# Patient Record
Sex: MALE | Race: WHITE | NOT HISPANIC OR LATINO | URBAN - METROPOLITAN AREA
[De-identification: names, ages, dates, MRNs, and addresses within clinical notes are randomized per-mention and may not be internally consistent; named-entity substitution may affect disease eponyms.]

---

## 2017-05-02 ENCOUNTER — INPATIENT (INPATIENT)
Facility: HOSPITAL | Age: 67
LOS: 1 days | Discharge: HOME | DRG: 247 | End: 2017-05-04
Attending: FAMILY MEDICINE | Admitting: FAMILY MEDICINE
Payer: MEDICAID

## 2017-05-02 VITALS
HEART RATE: 47 BPM | SYSTOLIC BLOOD PRESSURE: 156 MMHG | OXYGEN SATURATION: 96 % | TEMPERATURE: 98 F | DIASTOLIC BLOOD PRESSURE: 89 MMHG | RESPIRATION RATE: 17 BRPM

## 2017-05-02 DIAGNOSIS — Z29.9 ENCOUNTER FOR PROPHYLACTIC MEASURES, UNSPECIFIED: ICD-10-CM

## 2017-05-02 DIAGNOSIS — I25.10 ATHEROSCLEROTIC HEART DISEASE OF NATIVE CORONARY ARTERY WITHOUT ANGINA PECTORIS: ICD-10-CM

## 2017-05-02 DIAGNOSIS — R07.9 CHEST PAIN, UNSPECIFIED: ICD-10-CM

## 2017-05-02 DIAGNOSIS — I10 ESSENTIAL (PRIMARY) HYPERTENSION: ICD-10-CM

## 2017-05-02 DIAGNOSIS — Z95.5 PRESENCE OF CORONARY ANGIOPLASTY IMPLANT AND GRAFT: Chronic | ICD-10-CM

## 2017-05-02 DIAGNOSIS — E78.5 HYPERLIPIDEMIA, UNSPECIFIED: ICD-10-CM

## 2017-05-02 LAB
ALBUMIN SERPL ELPH-MCNC: 4.5 G/DL — SIGNIFICANT CHANGE UP (ref 3.3–5)
ALP SERPL-CCNC: 48 U/L — SIGNIFICANT CHANGE UP (ref 40–120)
ALT FLD-CCNC: 24 U/L RC — SIGNIFICANT CHANGE UP (ref 10–45)
ANION GAP SERPL CALC-SCNC: 13 MMOL/L — SIGNIFICANT CHANGE UP (ref 5–17)
APTT BLD: 29.5 SEC — SIGNIFICANT CHANGE UP (ref 27.5–37.4)
AST SERPL-CCNC: 20 U/L — SIGNIFICANT CHANGE UP (ref 10–40)
BASOPHILS # BLD AUTO: 0 K/UL — SIGNIFICANT CHANGE UP (ref 0–0.2)
BASOPHILS NFR BLD AUTO: 0.4 % — SIGNIFICANT CHANGE UP (ref 0–2)
BILIRUB SERPL-MCNC: 0.3 MG/DL — SIGNIFICANT CHANGE UP (ref 0.2–1.2)
BUN SERPL-MCNC: 22 MG/DL — SIGNIFICANT CHANGE UP (ref 7–23)
CALCIUM SERPL-MCNC: 9.6 MG/DL — SIGNIFICANT CHANGE UP (ref 8.4–10.5)
CHLORIDE SERPL-SCNC: 106 MMOL/L — SIGNIFICANT CHANGE UP (ref 96–108)
CO2 SERPL-SCNC: 25 MMOL/L — SIGNIFICANT CHANGE UP (ref 22–31)
CREAT SERPL-MCNC: 0.95 MG/DL — SIGNIFICANT CHANGE UP (ref 0.5–1.3)
EOSINOPHIL # BLD AUTO: 0.2 K/UL — SIGNIFICANT CHANGE UP (ref 0–0.5)
EOSINOPHIL NFR BLD AUTO: 2.9 % — SIGNIFICANT CHANGE UP (ref 0–6)
GLUCOSE SERPL-MCNC: 114 MG/DL — HIGH (ref 70–99)
HCT VFR BLD CALC: 44 % — SIGNIFICANT CHANGE UP (ref 39–50)
HGB BLD-MCNC: 14.8 G/DL — SIGNIFICANT CHANGE UP (ref 13–17)
INR BLD: 1.04 RATIO — SIGNIFICANT CHANGE UP (ref 0.88–1.16)
LIDOCAIN IGE QN: 29 U/L — SIGNIFICANT CHANGE UP (ref 7–60)
LYMPHOCYTES # BLD AUTO: 2.7 K/UL — SIGNIFICANT CHANGE UP (ref 1–3.3)
LYMPHOCYTES # BLD AUTO: 42.1 % — SIGNIFICANT CHANGE UP (ref 13–44)
MCHC RBC-ENTMCNC: 32.2 PG — SIGNIFICANT CHANGE UP (ref 27–34)
MCHC RBC-ENTMCNC: 33.6 GM/DL — SIGNIFICANT CHANGE UP (ref 32–36)
MCV RBC AUTO: 96.1 FL — SIGNIFICANT CHANGE UP (ref 80–100)
MONOCYTES # BLD AUTO: 0.4 K/UL — SIGNIFICANT CHANGE UP (ref 0–0.9)
MONOCYTES NFR BLD AUTO: 6.8 % — SIGNIFICANT CHANGE UP (ref 2–14)
NEUTROPHILS # BLD AUTO: 3 K/UL — SIGNIFICANT CHANGE UP (ref 1.8–7.4)
NEUTROPHILS NFR BLD AUTO: 47.7 % — SIGNIFICANT CHANGE UP (ref 43–77)
NT-PROBNP SERPL-SCNC: 372 PG/ML — HIGH (ref 0–300)
PLATELET # BLD AUTO: 121 K/UL — LOW (ref 150–400)
POTASSIUM SERPL-MCNC: 4.6 MMOL/L — SIGNIFICANT CHANGE UP (ref 3.5–5.3)
POTASSIUM SERPL-SCNC: 4.6 MMOL/L — SIGNIFICANT CHANGE UP (ref 3.5–5.3)
PROT SERPL-MCNC: 7.1 G/DL — SIGNIFICANT CHANGE UP (ref 6–8.3)
PROTHROM AB SERPL-ACNC: 11.3 SEC — SIGNIFICANT CHANGE UP (ref 9.8–12.7)
RBC # BLD: 4.58 M/UL — SIGNIFICANT CHANGE UP (ref 4.2–5.8)
RBC # FLD: 13 % — SIGNIFICANT CHANGE UP (ref 10.3–14.5)
SODIUM SERPL-SCNC: 144 MMOL/L — SIGNIFICANT CHANGE UP (ref 135–145)
TROPONIN T SERPL-MCNC: <0.01 NG/ML — SIGNIFICANT CHANGE UP (ref 0–0.06)
WBC # BLD: 6.3 K/UL — SIGNIFICANT CHANGE UP (ref 3.8–10.5)
WBC # FLD AUTO: 6.3 K/UL — SIGNIFICANT CHANGE UP (ref 3.8–10.5)

## 2017-05-02 PROCEDURE — 99223 1ST HOSP IP/OBS HIGH 75: CPT

## 2017-05-02 PROCEDURE — 99285 EMERGENCY DEPT VISIT HI MDM: CPT | Mod: 25

## 2017-05-02 PROCEDURE — 93010 ELECTROCARDIOGRAM REPORT: CPT

## 2017-05-02 PROCEDURE — 99497 ADVNCD CARE PLAN 30 MIN: CPT | Mod: 25

## 2017-05-02 PROCEDURE — 71020: CPT | Mod: 26

## 2017-05-02 RX ORDER — ACETAMINOPHEN 500 MG
650 TABLET ORAL EVERY 6 HOURS
Qty: 0 | Refills: 0 | Status: DISCONTINUED | OUTPATIENT
Start: 2017-05-02 | End: 2017-05-04

## 2017-05-02 RX ORDER — HEPARIN SODIUM 5000 [USP'U]/ML
5000 INJECTION INTRAVENOUS; SUBCUTANEOUS EVERY 8 HOURS
Qty: 0 | Refills: 0 | Status: DISCONTINUED | OUTPATIENT
Start: 2017-05-02 | End: 2017-05-04

## 2017-05-02 RX ORDER — ASPIRIN/CALCIUM CARB/MAGNESIUM 324 MG
325 TABLET ORAL ONCE
Qty: 0 | Refills: 0 | Status: COMPLETED | OUTPATIENT
Start: 2017-05-02 | End: 2017-05-02

## 2017-05-02 RX ORDER — ASPIRIN/CALCIUM CARB/MAGNESIUM 324 MG
81 TABLET ORAL DAILY
Qty: 0 | Refills: 0 | Status: DISCONTINUED | OUTPATIENT
Start: 2017-05-02 | End: 2017-05-04

## 2017-05-02 RX ORDER — SODIUM CHLORIDE 9 MG/ML
3 INJECTION INTRAMUSCULAR; INTRAVENOUS; SUBCUTANEOUS ONCE
Qty: 0 | Refills: 0 | Status: COMPLETED | OUTPATIENT
Start: 2017-05-02 | End: 2017-05-02

## 2017-05-02 RX ADMIN — HEPARIN SODIUM 5000 UNIT(S): 5000 INJECTION INTRAVENOUS; SUBCUTANEOUS at 23:38

## 2017-05-02 RX ADMIN — Medication 325 MILLIGRAM(S): at 20:07

## 2017-05-02 RX ADMIN — SODIUM CHLORIDE 3 MILLILITER(S): 9 INJECTION INTRAMUSCULAR; INTRAVENOUS; SUBCUTANEOUS at 20:01

## 2017-05-02 NOTE — H&P ADULT. - FAMILY HISTORY
No pertinent family history in first degree relatives Sibling  Still living? Unknown  Family history of coronary artery disease, Age at diagnosis: Age Unknown

## 2017-05-02 NOTE — ED ADULT TRIAGE NOTE - CHIEF COMPLAINT QUOTE
pts daughter states sent by PMD for admission due to abnormal ekg. the past few weeks he has had CP/sweating with exertion. pt denies any pain at present

## 2017-05-02 NOTE — ED PROVIDER NOTE - MEDICAL DECISION MAKING DETAILS
Yrn: 67 year old male with chest pain, cad, htn, hld, mi, with chest pain with minimal exertion and sob, associated with sweats. saw pmd today sent to ER for evaluation. will get labs, r/o acs, admit.

## 2017-05-02 NOTE — H&P ADULT. - PROBLEM SELECTOR PLAN 2
c/w antihypertensives Intermittent day sweats, weakness, bradycardia noted on ekg.  check thyroid function

## 2017-05-02 NOTE — ED ADULT NURSE NOTE - GASTROINTESTINAL WDL
Abdomen is soft, nondistended, nontender to palpation. soft, nontender, nondistended, bowel sounds present in all 4 quadrants.

## 2017-05-02 NOTE — ED ADULT NURSE NOTE - OBJECTIVE STATEMENT
67 y/po M presents to the ED c/o Chest pain.  Patient has a hx of CAD, HTN, HLD, MI (12 years ago, and 10 years ago).  Patient states he had stents placed in the past, but does not know how many.  Patient states that he has chest pain along with SOB and sweats,  Patient states he has been having this pain for a few months, but the symptoms have become worse.  Patient denies and L arm pain.  Patient states he has some numbness in the fingers, but the symptoms come and go.  Patient also states he become SOB with minimal activity.  Patient is A&Ox4. Face is symmetrical. PERRL 3mmB. Speech is clear.  Patient placed on cardiac monitor.  EKG done.  Patient safety and comfort measures provided.

## 2017-05-02 NOTE — H&P ADULT. - PROBLEM SELECTOR PLAN 1
-trend cardiac enzymes  -check tte  -monitor on telemetry  -check a1c, tsh, lipids trop neg x1, ekg non-ischemic notable for sinus bradycardia, cxr clear.  -trend cardiac enzymes  -order exercise stress test  -check tte  -monitor on telemetry  -check a1c, tsh, lipids  -nitro prn

## 2017-05-02 NOTE — H&P ADULT. - HISTORY OF PRESENT ILLNESS
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 notes over the past few months he has been experiencing persistent exertional shortness of breath with associated L sided chest pain that resolves when he sits down to rest. he has trouble even walking up a couple of stairs and can only walk around one block before becoming short of breath. he has a family history of coronary artery disease in all of his 3 siblings. he is an active smoker of at least 1/2 ppd x 40 years. he also notes around 2 months of intermittent sweating and weakness. he notes this is separate from his exercise problems. denies constipation, palpitations, hair loss, weight loss/gain, cough, lymphadenopathy. he recently visited [place] and returned last week but all of his symptoms preceded his visit to [facility]. denies sick contacts.    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 notes over the past few months he has been experiencing persistent exertional shortness of breath with associated L sided chest pain that resolves when he sits down to rest. he has trouble even walking up a couple of stairs and can only walk around one block before becoming short of breath. he has a family history of coronary artery disease in all of his 3 siblings. he is an active smoker of at least 1/2 ppd x 40 years. he also notes around 2 months of intermittent sweating and weakness. he notes this is separate from his exercise problems. the sweating typically occurs during the day but does not occur every day, rarely occurs at night. not associated w/ cp, sob, fevers, chills, constipation, palpitations, hair loss, weight loss/gain, cough, lymphadenopathy. he recently visited [place] and returned last week but all of his symptoms preceded his visit to [place]. denies sick contacts.    ED VS: Tmax: 97.8, BP: 149-156/78-89, P: 47-52, R: 17-18, O2: 96% on RA  ED meds: aspirin 325mg po

## 2017-05-02 NOTE — H&P ADULT. - ASSESSMENT
67M hx cad, htn, hld, MI p/w chest pain, sob, sweats. 67M hx cad, htn, hld, MI p/w exertional chest pain, sob, sweats.

## 2017-05-02 NOTE — ED PROVIDER NOTE - OBJECTIVE STATEMENT
67 year old male with pmhx of cad, htn, hld, mi 10 years ago presents with intermittent chest pain anterior chest pain, with sob and sweats. Patient states that he has been having worsening pain with minimal exertion and out of breath with walking one block. Patient has mild swelling to b/l le. Also states that these symptoms are similar to ten years ago. 67 year old male with pmhx of cad, htn, hld, mi 10 years ago presents with intermittent chest pain anterior chest pain, with sob and sweats. Patient states that he has been having worsening pain with minimal exertion and out of breath with walking one block. Patient has mild swelling to b/l le. Also states that these symptoms are similar to ten years ago. patient reports he can get up about 1 flight of stairs before develooping significant chest pain and sob that requires 15-20 minutes of rest. 2 stents placed. not on asa or plavix

## 2017-05-02 NOTE — H&P ADULT. - PROBLEM SELECTOR PLAN 7
I personally provided 20 minutes of advance care planning. discussed specifics of dnr/dni and goals of care. after discussion, patient would want to be FULL CODE.

## 2017-05-02 NOTE — H&P ADULT. - EKG AND INTERPRETATION
EKG personally reviewed. EKG personally reviewed: marked sinus bradycardia @ 47bpm, intervals wnl, no acute ischemic changes

## 2017-05-03 ENCOUNTER — TRANSCRIPTION ENCOUNTER (OUTPATIENT)
Age: 67
End: 2017-05-03

## 2017-05-03 DIAGNOSIS — I20.8 OTHER FORMS OF ANGINA PECTORIS: ICD-10-CM

## 2017-05-03 DIAGNOSIS — R61 GENERALIZED HYPERHIDROSIS: ICD-10-CM

## 2017-05-03 DIAGNOSIS — Z71.89 OTHER SPECIFIED COUNSELING: ICD-10-CM

## 2017-05-03 LAB
ANION GAP SERPL CALC-SCNC: 13 MMOL/L — SIGNIFICANT CHANGE UP (ref 5–17)
BASOPHILS # BLD AUTO: 0.02 K/UL — SIGNIFICANT CHANGE UP (ref 0–0.2)
BASOPHILS NFR BLD AUTO: 0.3 % — SIGNIFICANT CHANGE UP (ref 0–2)
BUN SERPL-MCNC: 18 MG/DL — SIGNIFICANT CHANGE UP (ref 7–23)
CALCIUM SERPL-MCNC: 8.7 MG/DL — SIGNIFICANT CHANGE UP (ref 8.4–10.5)
CHLORIDE SERPL-SCNC: 107 MMOL/L — SIGNIFICANT CHANGE UP (ref 96–108)
CHOLEST SERPL-MCNC: 155 MG/DL — SIGNIFICANT CHANGE UP (ref 10–199)
CK MB CFR SERPL CALC: 2 NG/ML — SIGNIFICANT CHANGE UP (ref 0–6.7)
CK MB CFR SERPL CALC: 2 NG/ML — SIGNIFICANT CHANGE UP (ref 0–6.7)
CO2 SERPL-SCNC: 22 MMOL/L — SIGNIFICANT CHANGE UP (ref 22–31)
CREAT SERPL-MCNC: 0.82 MG/DL — SIGNIFICANT CHANGE UP (ref 0.5–1.3)
EOSINOPHIL # BLD AUTO: 0.2 K/UL — SIGNIFICANT CHANGE UP (ref 0–0.5)
EOSINOPHIL NFR BLD AUTO: 3 % — SIGNIFICANT CHANGE UP (ref 0–6)
GLUCOSE SERPL-MCNC: 124 MG/DL — HIGH (ref 70–99)
HBA1C BLD-MCNC: 6.9 % — HIGH (ref 4–5.6)
HCT VFR BLD CALC: 42.1 % — SIGNIFICANT CHANGE UP (ref 39–50)
HDLC SERPL-MCNC: 40 MG/DL — SIGNIFICANT CHANGE UP (ref 40–125)
HGB BLD-MCNC: 14.7 G/DL — SIGNIFICANT CHANGE UP (ref 13–17)
IMM GRANULOCYTES NFR BLD AUTO: 0.2 % — SIGNIFICANT CHANGE UP (ref 0–1.5)
LIPID PNL WITH DIRECT LDL SERPL: 95 MG/DL — SIGNIFICANT CHANGE UP
LYMPHOCYTES # BLD AUTO: 2.46 K/UL — SIGNIFICANT CHANGE UP (ref 1–3.3)
LYMPHOCYTES # BLD AUTO: 37.3 % — SIGNIFICANT CHANGE UP (ref 13–44)
MCHC RBC-ENTMCNC: 32.6 PG — SIGNIFICANT CHANGE UP (ref 27–34)
MCHC RBC-ENTMCNC: 34.9 GM/DL — SIGNIFICANT CHANGE UP (ref 32–36)
MCV RBC AUTO: 93.3 FL — SIGNIFICANT CHANGE UP (ref 80–100)
MONOCYTES # BLD AUTO: 0.42 K/UL — SIGNIFICANT CHANGE UP (ref 0–0.9)
MONOCYTES NFR BLD AUTO: 6.4 % — SIGNIFICANT CHANGE UP (ref 2–14)
NEUTROPHILS # BLD AUTO: 3.48 K/UL — SIGNIFICANT CHANGE UP (ref 1.8–7.4)
NEUTROPHILS NFR BLD AUTO: 52.8 % — SIGNIFICANT CHANGE UP (ref 43–77)
PLATELET # BLD AUTO: 118 K/UL — LOW (ref 150–400)
POTASSIUM SERPL-MCNC: 4.1 MMOL/L — SIGNIFICANT CHANGE UP (ref 3.5–5.3)
POTASSIUM SERPL-SCNC: 4.1 MMOL/L — SIGNIFICANT CHANGE UP (ref 3.5–5.3)
RBC # BLD: 4.51 M/UL — SIGNIFICANT CHANGE UP (ref 4.2–5.8)
RBC # FLD: 14.4 % — SIGNIFICANT CHANGE UP (ref 10.3–14.5)
SODIUM SERPL-SCNC: 142 MMOL/L — SIGNIFICANT CHANGE UP (ref 135–145)
TOTAL CHOLESTEROL/HDL RATIO MEASUREMENT: 3.9 RATIO — SIGNIFICANT CHANGE UP (ref 3.4–9.6)
TRIGL SERPL-MCNC: 100 MG/DL — SIGNIFICANT CHANGE UP (ref 10–149)
TROPONIN T SERPL-MCNC: <0.01 NG/ML — SIGNIFICANT CHANGE UP (ref 0–0.06)
TROPONIN T SERPL-MCNC: <0.01 NG/ML — SIGNIFICANT CHANGE UP (ref 0–0.06)
TSH SERPL-MCNC: 0.71 UIU/ML — SIGNIFICANT CHANGE UP (ref 0.27–4.2)
WBC # BLD: 6.59 K/UL — SIGNIFICANT CHANGE UP (ref 3.8–10.5)
WBC # FLD AUTO: 6.59 K/UL — SIGNIFICANT CHANGE UP (ref 3.8–10.5)

## 2017-05-03 PROCEDURE — 92928 PRQ TCAT PLMT NTRAC ST 1 LES: CPT | Mod: RC,GC

## 2017-05-03 PROCEDURE — 93458 L HRT ARTERY/VENTRICLE ANGIO: CPT | Mod: 26,59,GC

## 2017-05-03 PROCEDURE — 99223 1ST HOSP IP/OBS HIGH 75: CPT

## 2017-05-03 PROCEDURE — 93010 ELECTROCARDIOGRAM REPORT: CPT

## 2017-05-03 RX ORDER — ATORVASTATIN CALCIUM 80 MG/1
40 TABLET, FILM COATED ORAL AT BEDTIME
Qty: 0 | Refills: 0 | Status: DISCONTINUED | OUTPATIENT
Start: 2017-05-03 | End: 2017-05-04

## 2017-05-03 RX ORDER — IPRATROPIUM/ALBUTEROL SULFATE 18-103MCG
3 AEROSOL WITH ADAPTER (GRAM) INHALATION EVERY 6 HOURS
Qty: 0 | Refills: 0 | Status: DISCONTINUED | OUTPATIENT
Start: 2017-05-03 | End: 2017-05-04

## 2017-05-03 RX ORDER — NITROGLYCERIN 6.5 MG
0.4 CAPSULE, EXTENDED RELEASE ORAL
Qty: 0 | Refills: 0 | Status: DISCONTINUED | OUTPATIENT
Start: 2017-05-03 | End: 2017-05-04

## 2017-05-03 RX ORDER — TICAGRELOR 90 MG/1
90 TABLET ORAL
Qty: 0 | Refills: 0 | Status: DISCONTINUED | OUTPATIENT
Start: 2017-05-04 | End: 2017-05-04

## 2017-05-03 RX ORDER — TICAGRELOR 90 MG/1
1 TABLET ORAL
Qty: 180 | Refills: 3 | OUTPATIENT
Start: 2017-05-03 | End: 2018-04-27

## 2017-05-03 RX ORDER — AMLODIPINE BESYLATE 2.5 MG/1
5 TABLET ORAL DAILY
Qty: 0 | Refills: 0 | Status: DISCONTINUED | OUTPATIENT
Start: 2017-05-03 | End: 2017-05-04

## 2017-05-03 RX ORDER — LISINOPRIL 2.5 MG/1
40 TABLET ORAL DAILY
Qty: 0 | Refills: 0 | Status: DISCONTINUED | OUTPATIENT
Start: 2017-05-03 | End: 2017-05-04

## 2017-05-03 RX ORDER — ASPIRIN/CALCIUM CARB/MAGNESIUM 324 MG
1 TABLET ORAL
Qty: 0 | Refills: 0 | COMMUNITY
Start: 2017-05-03

## 2017-05-03 RX ORDER — ISOSORBIDE MONONITRATE 60 MG/1
60 TABLET, EXTENDED RELEASE ORAL DAILY
Qty: 0 | Refills: 0 | Status: DISCONTINUED | OUTPATIENT
Start: 2017-05-03 | End: 2017-05-04

## 2017-05-03 RX ADMIN — ATORVASTATIN CALCIUM 40 MILLIGRAM(S): 80 TABLET, FILM COATED ORAL at 23:07

## 2017-05-03 RX ADMIN — AMLODIPINE BESYLATE 5 MILLIGRAM(S): 2.5 TABLET ORAL at 06:28

## 2017-05-03 RX ADMIN — HEPARIN SODIUM 5000 UNIT(S): 5000 INJECTION INTRAVENOUS; SUBCUTANEOUS at 06:28

## 2017-05-03 RX ADMIN — LISINOPRIL 40 MILLIGRAM(S): 2.5 TABLET ORAL at 06:28

## 2017-05-03 RX ADMIN — HEPARIN SODIUM 5000 UNIT(S): 5000 INJECTION INTRAVENOUS; SUBCUTANEOUS at 23:07

## 2017-05-03 RX ADMIN — ISOSORBIDE MONONITRATE 60 MILLIGRAM(S): 60 TABLET, EXTENDED RELEASE ORAL at 18:04

## 2017-05-03 NOTE — DISCHARGE NOTE ADULT - MEDICATION SUMMARY - MEDICATIONS TO TAKE
I will START or STAY ON the medications listed below when I get home from the hospital:    aspirin 81 mg oral tablet, chewable  -- 1 tab(s) by mouth once a day  -- Indication: For Coronary artery disease    ramipril 10 mg oral capsule  -- 1 cap(s) by mouth once a day  -- Indication: For HTN (hypertension)    isosorbide mononitrate 60 mg oral tablet, extended release  -- 1 tab(s) by mouth once a day (in the morning)  -- Indication: For HTN (hypertension)    rosuvastatin 10 mg oral tablet  -- 1 tab(s) by mouth once a day (at bedtime)  -- Indication: For HLD (hyperlipidemia)    Brilinta (ticagrelor) 90 mg oral tablet  -- 1 tab(s) by mouth 2 times a day MDD:two  -- It is very important that you take or use this exactly as directed.  Do not skip doses or discontinue unless directed by your doctor.  Obtain medical advice before taking any non-prescription drugs as some may affect the action of this medication.    -- Indication: For S/P primary angioplasty with coronary stent    amLODIPine 5 mg oral tablet  -- 1 tab(s) by mouth once a day  -- Indication: For HTN (hypertension)

## 2017-05-03 NOTE — PROVIDER CONTACT NOTE (OTHER) - REASON
Patient has an order for bedrest. Questioning if order can be changed to OOB with assistance since patient is pretty steady on feet

## 2017-05-03 NOTE — DISCHARGE NOTE ADULT - CARE PROVIDERS DIRECT ADDRESSES
,DirectAddress_Unknown,[email or web address]

## 2017-05-03 NOTE — DISCHARGE NOTE ADULT - ADDITIONAL INSTRUCTIONS
Make appointments to follow up with your out patient physicians.  Bring all discharge paperwork including discharge medication list to your follow up appointments.  Follow up with your PCP and cardiology.

## 2017-05-03 NOTE — DISCHARGE NOTE ADULT - PATIENT PORTAL LINK FT
“You can access the FollowHealth Patient Portal, offered by [facility], by registering with the following website: [email or web address]”

## 2017-05-03 NOTE — PROVIDER CONTACT NOTE (OTHER) - ACTION/TREATMENT ORDERED:
As per Monalisa Durant don't give patient aspirin. Provider will order R2 pads to be worn by patient

## 2017-05-03 NOTE — PROVIDER CONTACT NOTE (OTHER) - ASSESSMENT
Patient's HR is sustaining sinus bradycardia in 40s. Patient is asymptomatic. Patient was given 325 mg of aspirin in ED.

## 2017-05-03 NOTE — DISCHARGE NOTE ADULT - PLAN OF CARE
to remain without reoccurring Chest pain and or complication from cardiac cath Follow up with your medical doctor to establish long term blood pressure treatment goals. Continue your medications. Do not stop Aspirin or Brilinta unless instructed by your cardiologist.  No heavy lifting or pushing/pulling or strenuous activity with procedure arm for 2 weeks. No driving for 2 days. No sex for 1 week.  You may shower 24 hours following procedure but no soaking of your wrist in water (such as in a pool, sink, or tub) for 1 week. Check wrist site for bleeding and/or swelling daily following procedure. Call your doctor/cardiologist immediately for bleeding or swelling or if you have increased/persistent pain or drainage at the wrist site or if you have numbness, tingling or blue or white coloring of your hand or fingers.  Follow up with your cardiologist in 1- 2 weeks. You may call [facility] at [phone] or [phone] after office hours and weekends with any questions or concerns following your procedure. Continue your medications. Do not stop Aspirin or Plavix unless instructed by your cardiologist.  No heavy lifting or pushing/pulling or strenuous activity with procedure arm for 2 weeks. No driving for 2 days. No sex for 1 week.  You may shower 24 hours following procedure but no soaking of your wrist in water (such as in a pool, sink, or tub) for 1 week. Check wrist site for bleeding and/or swelling daily following procedure. Call your doctor/cardiologist immediately for bleeding or swelling or if you have increased/persistent pain or drainage at the wrist site or if you have numbness, tingling or blue or white coloring of your hand or fingers.  Follow up with your cardiologist in 1- 2 weeks. You may call [facility] at [phone] or [phone] after office hours and weekends with any questions or concerns following your procedure.

## 2017-05-03 NOTE — PROVIDER CONTACT NOTE (CRITICAL VALUE NOTIFICATION) - BACKGROUND
Patient admitted to hospital for ALL. Patient transferred from [address] [place] [address] on 5/2 d/t chest pain and rapid A-Fib.

## 2017-05-03 NOTE — DISCHARGE NOTE ADULT - CARE PLAN
Principal Discharge DX:	Coronary artery disease  Goal:	to remain without reoccurring Chest pain and or complication from cardiac cath  Instructions for follow-up, activity and diet:	Continue your medications. Do not stop Aspirin or Brilinta unless instructed by your cardiologist.  No heavy lifting or pushing/pulling or strenuous activity with procedure arm for 2 weeks. No driving for 2 days. No sex for 1 week.  You may shower 24 hours following procedure but no soaking of your wrist in water (such as in a pool, sink, or tub) for 1 week. Check wrist site for bleeding and/or swelling daily following procedure. Call your doctor/cardiologist immediately for bleeding or swelling or if you have increased/persistent pain or drainage at the wrist site or if you have numbness, tingling or blue or white coloring of your hand or fingers.  Follow up with your cardiologist in 1- 2 weeks. You may call [facility] at [phone] or [phone] after office hours and weekends with any questions or concerns following your procedure.  Secondary Diagnosis:	HTN (hypertension)  Instructions for follow-up, activity and diet:	Follow up with your medical doctor to establish long term blood pressure treatment goals.  Secondary Diagnosis:	Bradycardia Principal Discharge DX:	Coronary artery disease  Goal:	to remain without reoccurring Chest pain and or complication from cardiac cath  Instructions for follow-up, activity and diet:	Continue your medications. Do not stop Aspirin or Brilinta unless instructed by your cardiologist.  No heavy lifting or pushing/pulling or strenuous activity with procedure arm for 2 weeks. No driving for 2 days. No sex for 1 week.  You may shower 24 hours following procedure but no soaking of your wrist in water (such as in a pool, sink, or tub) for 1 week. Check wrist site for bleeding and/or swelling daily following procedure. Call your doctor/cardiologist immediately for bleeding or swelling or if you have increased/persistent pain or drainage at the wrist site or if you have numbness, tingling or blue or white coloring of your hand or fingers.  Follow up with your cardiologist in 1- 2 weeks. You may call [facility] at [phone] or [phone] after office hours and weekends with any questions or concerns following your procedure.  Secondary Diagnosis:	HTN (hypertension)  Instructions for follow-up, activity and diet:	Follow up with your medical doctor to establish long term blood pressure treatment goals.  Secondary Diagnosis:	Bradycardia Principal Discharge DX:	Coronary artery disease  Goal:	to remain without reoccurring Chest pain and or complication from cardiac cath  Instructions for follow-up, activity and diet:	Continue your medications. Do not stop Aspirin or Brilinta unless instructed by your cardiologist.  No heavy lifting or pushing/pulling or strenuous activity with procedure arm for 2 weeks. No driving for 2 days. No sex for 1 week.  You may shower 24 hours following procedure but no soaking of your wrist in water (such as in a pool, sink, or tub) for 1 week. Check wrist site for bleeding and/or swelling daily following procedure. Call your doctor/cardiologist immediately for bleeding or swelling or if you have increased/persistent pain or drainage at the wrist site or if you have numbness, tingling or blue or white coloring of your hand or fingers.  Follow up with your cardiologist in 1- 2 weeks. You may call [facility] at [phone] or [phone] after office hours and weekends with any questions or concerns following your procedure.  Secondary Diagnosis:	HTN (hypertension)  Instructions for follow-up, activity and diet:	Follow up with your medical doctor to establish long term blood pressure treatment goals.  Secondary Diagnosis:	Bradycardia  Secondary Diagnosis:	S/P primary angioplasty with coronary stent  Instructions for follow-up, activity and diet:	Continue your medications. Do not stop Aspirin or Plavix unless instructed by your cardiologist.  No heavy lifting or pushing/pulling or strenuous activity with procedure arm for 2 weeks. No driving for 2 days. No sex for 1 week.  You may shower 24 hours following procedure but no soaking of your wrist in water (such as in a pool, sink, or tub) for 1 week. Check wrist site for bleeding and/or swelling daily following procedure. Call your doctor/cardiologist immediately for bleeding or swelling or if you have increased/persistent pain or drainage at the wrist site or if you have numbness, tingling or blue or white coloring of your hand or fingers.  Follow up with your cardiologist in 1- 2 weeks. You may call [facility] at [phone] or [phone] after office hours and weekends with any questions or concerns following your procedure. Principal Discharge DX:	Coronary artery disease  Goal:	to remain without reoccurring Chest pain and or complication from cardiac cath  Instructions for follow-up, activity and diet:	Continue your medications. Do not stop Aspirin or Brilinta unless instructed by your cardiologist.  No heavy lifting or pushing/pulling or strenuous activity with procedure arm for 2 weeks. No driving for 2 days. No sex for 1 week.  You may shower 24 hours following procedure but no soaking of your wrist in water (such as in a pool, sink, or tub) for 1 week. Check wrist site for bleeding and/or swelling daily following procedure. Call your doctor/cardiologist immediately for bleeding or swelling or if you have increased/persistent pain or drainage at the wrist site or if you have numbness, tingling or blue or white coloring of your hand or fingers.  Follow up with your cardiologist in 1- 2 weeks. You may call [facility] at [phone] or [phone] after office hours and weekends with any questions or concerns following your procedure.  Secondary Diagnosis:	HTN (hypertension)  Instructions for follow-up, activity and diet:	Follow up with your medical doctor to establish long term blood pressure treatment goals.  Secondary Diagnosis:	Bradycardia  Secondary Diagnosis:	S/P primary angioplasty with coronary stent  Instructions for follow-up, activity and diet:	Continue your medications. Do not stop Aspirin or Plavix unless instructed by your cardiologist.  No heavy lifting or pushing/pulling or strenuous activity with procedure arm for 2 weeks. No driving for 2 days. No sex for 1 week.  You may shower 24 hours following procedure but no soaking of your wrist in water (such as in a pool, sink, or tub) for 1 week. Check wrist site for bleeding and/or swelling daily following procedure. Call your doctor/cardiologist immediately for bleeding or swelling or if you have increased/persistent pain or drainage at the wrist site or if you have numbness, tingling or blue or white coloring of your hand or fingers.  Follow up with your cardiologist in 1- 2 weeks. You may call [facility] at [phone] or [phone] after office hours and weekends with any questions or concerns following your procedure. Principal Discharge DX:	Coronary artery disease  Goal:	to remain without reoccurring Chest pain and or complication from cardiac cath  Assessment and plan of treatment:	Continue your medications. Do not stop Aspirin or Brilinta unless instructed by your cardiologist.  No heavy lifting or pushing/pulling or strenuous activity with procedure arm for 2 weeks. No driving for 2 days. No sex for 1 week.  You may shower 24 hours following procedure but no soaking of your wrist in water (such as in a pool, sink, or tub) for 1 week. Check wrist site for bleeding and/or swelling daily following procedure. Call your doctor/cardiologist immediately for bleeding or swelling or if you have increased/persistent pain or drainage at the wrist site or if you have numbness, tingling or blue or white coloring of your hand or fingers.  Follow up with your cardiologist in 1- 2 weeks. You may call [facility] at [phone] or [phone] after office hours and weekends with any questions or concerns following your procedure.  Secondary Diagnosis:	HTN (hypertension)  Assessment and plan of treatment:	Follow up with your medical doctor to establish long term blood pressure treatment goals.  Secondary Diagnosis:	Bradycardia  Secondary Diagnosis:	S/P primary angioplasty with coronary stent  Assessment and plan of treatment:	Continue your medications. Do not stop Aspirin or Plavix unless instructed by your cardiologist.  No heavy lifting or pushing/pulling or strenuous activity with procedure arm for 2 weeks. No driving for 2 days. No sex for 1 week.  You may shower 24 hours following procedure but no soaking of your wrist in water (such as in a pool, sink, or tub) for 1 week. Check wrist site for bleeding and/or swelling daily following procedure. Call your doctor/cardiologist immediately for bleeding or swelling or if you have increased/persistent pain or drainage at the wrist site or if you have numbness, tingling or blue or white coloring of your hand or fingers.  Follow up with your cardiologist in 1- 2 weeks. You may call [facility] at [phone] or [phone] after office hours and weekends with any questions or concerns following your procedure.

## 2017-05-03 NOTE — DISCHARGE NOTE ADULT - CARE PROVIDER_API CALL
Jarad Cheema (DO), Family Medicine  [address] [place] [address]  Phone: [phone]  Fax: [phone]    Marco Tapia), Cardiovascular Disease; Internal Medicine; Nuclear Cardiology  [address] [place] [address]  Phone: [phone]  Fax: [phone]

## 2017-05-03 NOTE — DISCHARGE NOTE ADULT - MEDICATION SUMMARY - MEDICATIONS TO STOP TAKING
I will STOP taking the medications listed below when I get home from the hospital:    Polpril / Ramiprilum 10 mg oral tablet  -- 1 tab(s) by mouth once a day (in the morning)  -- patient brought in medications from [facility]    Amlozek / Amlodipinum 5 mg oral tablet  -- 1 tab(s) by mouth once a day (in the morning)  -- patient brought in medications from [facility]    Effox long 50 mg oral tablet  -- 1 tab(s) by mouth once a day (in the morning)  -- patient brought in medications from [facility]    Bisocard 10 mg oral tablet  -- 1 tab(s) by mouth once a day (in the morning)  -- patient brought in medications from [facility]    Rosucard / Rosuvastatinum 10 mg oral tablet  -- 1 tab(s) by mouth once a day (at bedtime)    bisoprolol 10 mg oral tablet  -- 1 tab(s) by mouth once a day

## 2017-05-04 VITALS
RESPIRATION RATE: 17 BRPM | SYSTOLIC BLOOD PRESSURE: 137 MMHG | TEMPERATURE: 98 F | HEART RATE: 48 BPM | OXYGEN SATURATION: 97 % | DIASTOLIC BLOOD PRESSURE: 80 MMHG

## 2017-05-04 LAB
ANION GAP SERPL CALC-SCNC: 12 MMOL/L — SIGNIFICANT CHANGE UP (ref 5–17)
BUN SERPL-MCNC: 19 MG/DL — SIGNIFICANT CHANGE UP (ref 7–23)
CALCIUM SERPL-MCNC: 9 MG/DL — SIGNIFICANT CHANGE UP (ref 8.4–10.5)
CHLORIDE SERPL-SCNC: 106 MMOL/L — SIGNIFICANT CHANGE UP (ref 96–108)
CO2 SERPL-SCNC: 23 MMOL/L — SIGNIFICANT CHANGE UP (ref 22–31)
CREAT SERPL-MCNC: 0.94 MG/DL — SIGNIFICANT CHANGE UP (ref 0.5–1.3)
GLUCOSE SERPL-MCNC: 126 MG/DL — HIGH (ref 70–99)
HCT VFR BLD CALC: 42.8 % — SIGNIFICANT CHANGE UP (ref 39–50)
HGB BLD-MCNC: 14.5 G/DL — SIGNIFICANT CHANGE UP (ref 13–17)
MCHC RBC-ENTMCNC: 31.6 PG — SIGNIFICANT CHANGE UP (ref 27–34)
MCHC RBC-ENTMCNC: 33.9 GM/DL — SIGNIFICANT CHANGE UP (ref 32–36)
MCV RBC AUTO: 93.2 FL — SIGNIFICANT CHANGE UP (ref 80–100)
PLATELET # BLD AUTO: 125 K/UL — LOW (ref 150–400)
POTASSIUM SERPL-MCNC: 4.3 MMOL/L — SIGNIFICANT CHANGE UP (ref 3.5–5.3)
POTASSIUM SERPL-SCNC: 4.3 MMOL/L — SIGNIFICANT CHANGE UP (ref 3.5–5.3)
RBC # BLD: 4.59 M/UL — SIGNIFICANT CHANGE UP (ref 4.2–5.8)
RBC # FLD: 14.2 % — SIGNIFICANT CHANGE UP (ref 10.3–14.5)
SODIUM SERPL-SCNC: 141 MMOL/L — SIGNIFICANT CHANGE UP (ref 135–145)
WBC # BLD: 6.7 K/UL — SIGNIFICANT CHANGE UP (ref 3.8–10.5)
WBC # FLD AUTO: 6.7 K/UL — SIGNIFICANT CHANGE UP (ref 3.8–10.5)

## 2017-05-04 PROCEDURE — 80048 BASIC METABOLIC PNL TOTAL CA: CPT

## 2017-05-04 PROCEDURE — C1894: CPT

## 2017-05-04 PROCEDURE — C1874: CPT

## 2017-05-04 PROCEDURE — 71046 X-RAY EXAM CHEST 2 VIEWS: CPT

## 2017-05-04 PROCEDURE — C1769: CPT

## 2017-05-04 PROCEDURE — 85730 THROMBOPLASTIN TIME PARTIAL: CPT

## 2017-05-04 PROCEDURE — 85610 PROTHROMBIN TIME: CPT

## 2017-05-04 PROCEDURE — 93005 ELECTROCARDIOGRAM TRACING: CPT

## 2017-05-04 PROCEDURE — 83880 ASSAY OF NATRIURETIC PEPTIDE: CPT

## 2017-05-04 PROCEDURE — C1887: CPT

## 2017-05-04 PROCEDURE — 82553 CREATINE MB FRACTION: CPT

## 2017-05-04 PROCEDURE — 85027 COMPLETE CBC AUTOMATED: CPT

## 2017-05-04 PROCEDURE — 80053 COMPREHEN METABOLIC PANEL: CPT

## 2017-05-04 PROCEDURE — 99285 EMERGENCY DEPT VISIT HI MDM: CPT | Mod: 25

## 2017-05-04 PROCEDURE — 84484 ASSAY OF TROPONIN QUANT: CPT

## 2017-05-04 PROCEDURE — 84443 ASSAY THYROID STIM HORMONE: CPT

## 2017-05-04 PROCEDURE — 83036 HEMOGLOBIN GLYCOSYLATED A1C: CPT

## 2017-05-04 PROCEDURE — 93458 L HRT ARTERY/VENTRICLE ANGIO: CPT | Mod: 59

## 2017-05-04 PROCEDURE — 99232 SBSQ HOSP IP/OBS MODERATE 35: CPT

## 2017-05-04 PROCEDURE — C9600: CPT | Mod: RC

## 2017-05-04 PROCEDURE — 83690 ASSAY OF LIPASE: CPT

## 2017-05-04 PROCEDURE — C1725: CPT

## 2017-05-04 PROCEDURE — 80061 LIPID PANEL: CPT

## 2017-05-04 RX ORDER — BISOPROLOL FUMARATE 10 MG/1
1 TABLET, FILM COATED ORAL
Qty: 0 | Refills: 0 | COMMUNITY

## 2017-05-04 RX ADMIN — Medication 81 MILLIGRAM(S): at 12:09

## 2017-05-04 RX ADMIN — AMLODIPINE BESYLATE 5 MILLIGRAM(S): 2.5 TABLET ORAL at 06:09

## 2017-05-04 RX ADMIN — HEPARIN SODIUM 5000 UNIT(S): 5000 INJECTION INTRAVENOUS; SUBCUTANEOUS at 06:09

## 2017-05-04 RX ADMIN — ISOSORBIDE MONONITRATE 60 MILLIGRAM(S): 60 TABLET, EXTENDED RELEASE ORAL at 12:09

## 2017-05-04 RX ADMIN — TICAGRELOR 90 MILLIGRAM(S): 90 TABLET ORAL at 06:10

## 2017-05-04 RX ADMIN — LISINOPRIL 40 MILLIGRAM(S): 2.5 TABLET ORAL at 06:09

## 2017-07-27 PROBLEM — Z00.00 ENCOUNTER FOR PREVENTIVE HEALTH EXAMINATION: Status: ACTIVE | Noted: 2017-07-27

## 2017-08-31 ENCOUNTER — APPOINTMENT (OUTPATIENT)
Dept: CARDIOLOGY | Facility: CLINIC | Age: 67
End: 2017-08-31

## 2018-03-09 PROBLEM — I24.8: Chronic | Status: ACTIVE | Noted: 2017-05-02

## 2018-03-09 PROBLEM — E78.5 HYPERLIPIDEMIA, UNSPECIFIED: Chronic | Status: ACTIVE | Noted: 2017-05-02

## 2018-03-09 PROBLEM — I10 ESSENTIAL (PRIMARY) HYPERTENSION: Chronic | Status: ACTIVE | Noted: 2017-05-02

## 2018-03-12 PROBLEM — I71.4 AAA (ABDOMINAL AORTIC ANEURYSM): Status: ACTIVE | Noted: 2018-03-12

## 2018-03-13 ENCOUNTER — APPOINTMENT (OUTPATIENT)
Dept: THORACIC SURGERY | Facility: CLINIC | Age: 68
End: 2018-03-13
Payer: MEDICAID

## 2018-03-13 VITALS
RESPIRATION RATE: 16 BRPM | HEART RATE: 51 BPM | TEMPERATURE: 98.4 F | OXYGEN SATURATION: 96 % | DIASTOLIC BLOOD PRESSURE: 73 MMHG | SYSTOLIC BLOOD PRESSURE: 135 MMHG

## 2018-03-13 DIAGNOSIS — Z87.09 PERSONAL HISTORY OF OTHER DISEASES OF THE RESPIRATORY SYSTEM: ICD-10-CM

## 2018-03-13 DIAGNOSIS — Z82.49 FAMILY HISTORY OF ISCHEMIC HEART DISEASE AND OTHER DISEASES OF THE CIRCULATORY SYSTEM: ICD-10-CM

## 2018-03-13 DIAGNOSIS — I71.4 ABDOMINAL AORTIC ANEURYSM, W/OUT RUPTURE: ICD-10-CM

## 2018-03-13 DIAGNOSIS — I25.2 OLD MYOCARDIAL INFARCTION: ICD-10-CM

## 2018-03-13 PROCEDURE — 99205 OFFICE O/P NEW HI 60 MIN: CPT

## 2018-03-14 VITALS — BODY MASS INDEX: 30.21 KG/M2 | HEIGHT: 69 IN | WEIGHT: 204 LBS

## 2018-03-14 PROBLEM — Z87.09 HISTORY OF ASTHMA: Status: RESOLVED | Noted: 2018-03-14 | Resolved: 2018-03-14

## 2018-03-14 PROBLEM — I25.2 HISTORY OF MYOCARDIAL INFARCTION: Status: RESOLVED | Noted: 2018-03-14 | Resolved: 2018-03-14

## 2018-03-14 PROBLEM — Z82.49 FAMILY HISTORY OF WOLFF-PARKINSON-WHITE (WPW) SYNDROME: Status: ACTIVE | Noted: 2018-03-14

## 2018-03-20 ENCOUNTER — APPOINTMENT (OUTPATIENT)
Dept: PULMONOLOGY | Facility: CLINIC | Age: 68
End: 2018-03-20
Payer: MEDICAID

## 2018-03-20 PROCEDURE — 94726 PLETHYSMOGRAPHY LUNG VOLUMES: CPT

## 2018-03-20 PROCEDURE — 94729 DIFFUSING CAPACITY: CPT

## 2018-03-20 PROCEDURE — 94060 EVALUATION OF WHEEZING: CPT

## 2018-03-21 ENCOUNTER — OUTPATIENT (OUTPATIENT)
Dept: OUTPATIENT SERVICES | Facility: HOSPITAL | Age: 68
LOS: 1 days | End: 2018-03-21
Payer: MEDICAID

## 2018-03-21 VITALS
HEIGHT: 68 IN | TEMPERATURE: 98 F | WEIGHT: 199.96 LBS | RESPIRATION RATE: 16 BRPM | SYSTOLIC BLOOD PRESSURE: 130 MMHG | OXYGEN SATURATION: 96 % | DIASTOLIC BLOOD PRESSURE: 70 MMHG | HEART RATE: 52 BPM

## 2018-03-21 DIAGNOSIS — Z95.5 PRESENCE OF CORONARY ANGIOPLASTY IMPLANT AND GRAFT: Chronic | ICD-10-CM

## 2018-03-21 DIAGNOSIS — R91.1 SOLITARY PULMONARY NODULE: ICD-10-CM

## 2018-03-21 DIAGNOSIS — R91.8 OTHER NONSPECIFIC ABNORMAL FINDING OF LUNG FIELD: ICD-10-CM

## 2018-03-21 DIAGNOSIS — I10 ESSENTIAL (PRIMARY) HYPERTENSION: ICD-10-CM

## 2018-03-21 DIAGNOSIS — Z98.890 OTHER SPECIFIED POSTPROCEDURAL STATES: Chronic | ICD-10-CM

## 2018-03-21 LAB
ALBUMIN SERPL ELPH-MCNC: 4.2 G/DL — SIGNIFICANT CHANGE UP (ref 3.3–5)
ALP SERPL-CCNC: 51 U/L — SIGNIFICANT CHANGE UP (ref 40–120)
ALT FLD-CCNC: 24 U/L — SIGNIFICANT CHANGE UP (ref 4–41)
AST SERPL-CCNC: 19 U/L — SIGNIFICANT CHANGE UP (ref 4–40)
BILIRUB SERPL-MCNC: 0.3 MG/DL — SIGNIFICANT CHANGE UP (ref 0.2–1.2)
BLD GP AB SCN SERPL QL: NEGATIVE — SIGNIFICANT CHANGE UP
BUN SERPL-MCNC: 19 MG/DL — SIGNIFICANT CHANGE UP (ref 7–23)
CALCIUM SERPL-MCNC: 9.1 MG/DL — SIGNIFICANT CHANGE UP (ref 8.4–10.5)
CHLORIDE SERPL-SCNC: 106 MMOL/L — SIGNIFICANT CHANGE UP (ref 98–107)
CO2 SERPL-SCNC: 28 MMOL/L — SIGNIFICANT CHANGE UP (ref 22–31)
CREAT SERPL-MCNC: 0.8 MG/DL — SIGNIFICANT CHANGE UP (ref 0.5–1.3)
GLUCOSE SERPL-MCNC: 106 MG/DL — HIGH (ref 70–99)
HCT VFR BLD CALC: 42.4 % — SIGNIFICANT CHANGE UP (ref 39–50)
HGB BLD-MCNC: 14.3 G/DL — SIGNIFICANT CHANGE UP (ref 13–17)
MCHC RBC-ENTMCNC: 31.8 PG — SIGNIFICANT CHANGE UP (ref 27–34)
MCHC RBC-ENTMCNC: 33.7 % — SIGNIFICANT CHANGE UP (ref 32–36)
MCV RBC AUTO: 94.4 FL — SIGNIFICANT CHANGE UP (ref 80–100)
NRBC # FLD: 0 — SIGNIFICANT CHANGE UP
PLATELET # BLD AUTO: 134 K/UL — LOW (ref 150–400)
PMV BLD: 12 FL — SIGNIFICANT CHANGE UP (ref 7–13)
POTASSIUM SERPL-MCNC: 4.4 MMOL/L — SIGNIFICANT CHANGE UP (ref 3.5–5.3)
POTASSIUM SERPL-SCNC: 4.4 MMOL/L — SIGNIFICANT CHANGE UP (ref 3.5–5.3)
PROT SERPL-MCNC: 7.1 G/DL — SIGNIFICANT CHANGE UP (ref 6–8.3)
RBC # BLD: 4.49 M/UL — SIGNIFICANT CHANGE UP (ref 4.2–5.8)
RBC # FLD: 14.5 % — SIGNIFICANT CHANGE UP (ref 10.3–14.5)
RH IG SCN BLD-IMP: POSITIVE — SIGNIFICANT CHANGE UP
SODIUM SERPL-SCNC: 145 MMOL/L — SIGNIFICANT CHANGE UP (ref 135–145)
WBC # BLD: 9.19 K/UL — SIGNIFICANT CHANGE UP (ref 3.8–10.5)
WBC # FLD AUTO: 9.19 K/UL — SIGNIFICANT CHANGE UP (ref 3.8–10.5)

## 2018-03-21 PROCEDURE — 93010 ELECTROCARDIOGRAM REPORT: CPT

## 2018-03-21 RX ORDER — SODIUM CHLORIDE 9 MG/ML
1000 INJECTION, SOLUTION INTRAVENOUS
Qty: 0 | Refills: 0 | Status: DISCONTINUED | OUTPATIENT
Start: 2018-03-28 | End: 2018-03-29

## 2018-03-21 NOTE — H&P PST ADULT - PMH
AAA (abdominal aortic aneurysm)    Coronary insufficiency    HLD (hyperlipidemia)    HTN (hypertension)    Lung nodule    MI (myocardial infarction) AAA (abdominal aortic aneurysm)    Coronary insufficiency    HLD (hyperlipidemia)    HTN (hypertension)    Lung nodule    MI (myocardial infarction)    Smoking

## 2018-03-21 NOTE — H&P PST ADULT - NEGATIVE NEUROLOGICAL SYMPTOMS
no paresthesias/no generalized seizures/no focal seizures/no transient paralysis/no weakness/no syncope/no tremors

## 2018-03-21 NOTE — H&P PST ADULT - PROBLEM SELECTOR PLAN 1
trop neg x1, ekg non-ischemic notable for sinus bradycardia, cxr clear.  -trend cardiac enzymes  -order exercise stress test  -check tte  -monitor on telemetry  -check a1c, tsh, lipids  -nitro prn Pt given pre-op instructions and Famotidine and Chlorhexidine and verbalized understanding  Pt told to use Atrovent as Rx'd     Pt to see Cardiologist tomorrow and CC requested - Pt to confirm preop Plavix and ASA orders - pt told to start ASA  81 mg po today  OR [facility] notified of KARLEE precautions and CAD stent

## 2018-03-21 NOTE — H&P PST ADULT - HISTORY OF PRESENT ILLNESS
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 notes over the past few months he has been experiencing persistent exertional shortness of breath with associated L sided chest pain that resolves when he sits down to rest. he has trouble even walking up a couple of stairs and can only walk around one block before becoming short of breath. he has a family history of coronary artery disease in all of his 3 siblings. he is an active smoker of at least 1/2 ppd x 40 years. he also notes around 2 months of intermittent sweating and weakness. he notes this is separate from his exercise problems. the sweating typically occurs during the day but does not occur every day, rarely occurs at night. not associated w/ cp, sob, fevers, chills, constipation, palpitations, hair loss, weight loss/gain, cough, lymphadenopathy. he recently visited [place] and returned last week but all of his symptoms preceded his visit to [place]. denies sick contacts.    ED VS: Tmax: 97.8, BP: 149-156/78-89, P: 47-52, R: 17-18, O2: 96% on RA  ED meds: aspirin 325mg po Pt is a 67 yr old male scheduled for right Video Assisted Thoracoscopy Robotic Assisted Right Upper Lobe Wedge Resection Possible Right Upper Lobectomy wtih Dr Mejía 3/26/18 - pt speaks Polish and translation done by daughter who states that surgery has been changed to 3/28/18. Pt hx of CAD stent placed 5/17 and AAA that is under observation. Pt c/o of wheezing and CXR done 3 weeks ago wtih CT Scan and PET scan. Pt hx of HTN and smoking

## 2018-03-21 NOTE — H&P PST ADULT - EKG AND INTERPRETATION
EKG personally reviewed: marked sinus bradycardia @ 47bpm, intervals wnl, no acute ischemic changes EKG

## 2018-03-21 NOTE — H&P PST ADULT - NSANTHOSAYNRD_GEN_A_CORE
No. KARLEE screening performed.  STOP BANG Legend: 0-2 = LOW Risk; 3-4 = INTERMEDIATE Risk; 5-8 = HIGH Risk

## 2018-03-21 NOTE — H&P PST ADULT - RESPIRATORY AND THORAX COMMENTS
Hx of wheezing and evaluation by PCP with CXR 3 weeks ago - CT scan and PET scan done  - pt using inhaler from [facility] inconsistently - had PFTs done yesterday by PCP

## 2018-03-21 NOTE — H&P PST ADULT - PROBLEM SELECTOR PLAN 2
Intermittent day sweats, weakness, bradycardia noted on ekg.  check thyroid function Pt to take Bisocard, Ramipril, am DOS

## 2018-03-21 NOTE — H&P PST ADULT - CARDIOVASCULAR COMMENTS
CAD - stent placed 5/17 - had not been on ASA with Plavix - last dose of Plavix 3/20/18 - told to start ASA 81 mg po OD  today

## 2018-03-21 NOTE — H&P PST ADULT - NEGATIVE ENMT SYMPTOMS
no tinnitus/no throat pain/no hearing difficulty/no vertigo/no sinus symptoms/no dysphagia/no ear pain

## 2018-03-21 NOTE — H&P PST ADULT - PSH
History of ear surgery    S/P primary angioplasty with coronary stent History of ear surgery    S/P primary angioplasty with coronary stent  5/17 - [facility] Lot [id number]

## 2018-03-22 ENCOUNTER — APPOINTMENT (OUTPATIENT)
Dept: CARDIOLOGY | Facility: CLINIC | Age: 68
End: 2018-03-22
Payer: MEDICAID

## 2018-03-22 VITALS — WEIGHT: 204 LBS | BODY MASS INDEX: 30.21 KG/M2 | HEIGHT: 69 IN

## 2018-03-22 VITALS
OXYGEN SATURATION: 96 % | HEART RATE: 54 BPM | RESPIRATION RATE: 16 BRPM | DIASTOLIC BLOOD PRESSURE: 73 MMHG | SYSTOLIC BLOOD PRESSURE: 127 MMHG

## 2018-03-22 PROCEDURE — 99205 OFFICE O/P NEW HI 60 MIN: CPT

## 2018-03-22 PROCEDURE — 99215 OFFICE O/P EST HI 40 MIN: CPT

## 2018-03-23 ENCOUNTER — OUTPATIENT (OUTPATIENT)
Dept: OUTPATIENT SERVICES | Facility: HOSPITAL | Age: 68
LOS: 1 days | End: 2018-03-23
Payer: MEDICAID

## 2018-03-23 ENCOUNTER — APPOINTMENT (OUTPATIENT)
Dept: CV DIAGNOSITCS | Facility: HOSPITAL | Age: 68
End: 2018-03-23

## 2018-03-23 DIAGNOSIS — Z95.5 PRESENCE OF CORONARY ANGIOPLASTY IMPLANT AND GRAFT: Chronic | ICD-10-CM

## 2018-03-23 DIAGNOSIS — I25.10 ATHEROSCLEROTIC HEART DISEASE OF NATIVE CORONARY ARTERY WITHOUT ANGINA PECTORIS: ICD-10-CM

## 2018-03-23 DIAGNOSIS — Z98.890 OTHER SPECIFIED POSTPROCEDURAL STATES: Chronic | ICD-10-CM

## 2018-03-23 PROCEDURE — 93306 TTE W/DOPPLER COMPLETE: CPT | Mod: 26

## 2018-03-27 ENCOUNTER — TRANSCRIPTION ENCOUNTER (OUTPATIENT)
Age: 68
End: 2018-03-27

## 2018-03-28 ENCOUNTER — APPOINTMENT (OUTPATIENT)
Dept: THORACIC SURGERY | Facility: HOSPITAL | Age: 68
End: 2018-03-28

## 2018-03-28 ENCOUNTER — INPATIENT (INPATIENT)
Facility: HOSPITAL | Age: 68
LOS: 3 days | Discharge: HOME | End: 2018-04-01
Attending: THORACIC SURGERY (CARDIOTHORACIC VASCULAR SURGERY) | Admitting: THORACIC SURGERY (CARDIOTHORACIC VASCULAR SURGERY)
Payer: MEDICAID

## 2018-03-28 ENCOUNTER — RESULT REVIEW (OUTPATIENT)
Age: 68
End: 2018-03-28

## 2018-03-28 VITALS
HEIGHT: 68 IN | DIASTOLIC BLOOD PRESSURE: 73 MMHG | SYSTOLIC BLOOD PRESSURE: 147 MMHG | WEIGHT: 199.96 LBS | OXYGEN SATURATION: 97 % | TEMPERATURE: 98 F | RESPIRATION RATE: 16 BRPM | HEART RATE: 51 BPM

## 2018-03-28 DIAGNOSIS — Z98.890 OTHER SPECIFIED POSTPROCEDURAL STATES: Chronic | ICD-10-CM

## 2018-03-28 DIAGNOSIS — R91.8 OTHER NONSPECIFIC ABNORMAL FINDING OF LUNG FIELD: ICD-10-CM

## 2018-03-28 DIAGNOSIS — Z95.5 PRESENCE OF CORONARY ANGIOPLASTY IMPLANT AND GRAFT: Chronic | ICD-10-CM

## 2018-03-28 LAB — RH IG SCN BLD-IMP: POSITIVE — SIGNIFICANT CHANGE UP

## 2018-03-28 PROCEDURE — 88309 TISSUE EXAM BY PATHOLOGIST: CPT | Mod: 26

## 2018-03-28 PROCEDURE — 32663 THORACOSCOPY W/LOBECTOMY: CPT | Mod: AS

## 2018-03-28 PROCEDURE — 88331 PATH CONSLTJ SURG 1 BLK 1SPC: CPT | Mod: 26

## 2018-03-28 PROCEDURE — 32674 THORACOSCOPY LYMPH NODE EXC: CPT | Mod: AS

## 2018-03-28 PROCEDURE — 32666 THORACOSCOPY W/WEDGE RESECT: CPT | Mod: 59

## 2018-03-28 PROCEDURE — 32663 THORACOSCOPY W/LOBECTOMY: CPT

## 2018-03-28 PROCEDURE — 88305 TISSUE EXAM BY PATHOLOGIST: CPT | Mod: 26

## 2018-03-28 PROCEDURE — 32668 THORACOSCOPY W/W RESECT DIAG: CPT | Mod: AS

## 2018-03-28 PROCEDURE — 71045 X-RAY EXAM CHEST 1 VIEW: CPT | Mod: 26

## 2018-03-28 PROCEDURE — S2900 ROBOTIC SURGICAL SYSTEM: CPT | Mod: NC

## 2018-03-28 PROCEDURE — 99233 SBSQ HOSP IP/OBS HIGH 50: CPT

## 2018-03-28 PROCEDURE — 32674 THORACOSCOPY LYMPH NODE EXC: CPT

## 2018-03-28 PROCEDURE — 32668 THORACOSCOPY W/W RESECT DIAG: CPT

## 2018-03-28 PROCEDURE — 88307 TISSUE EXAM BY PATHOLOGIST: CPT | Mod: 26

## 2018-03-28 RX ORDER — IPRATROPIUM/ALBUTEROL SULFATE 18-103MCG
3 AEROSOL WITH ADAPTER (GRAM) INHALATION EVERY 6 HOURS
Qty: 0 | Refills: 0 | Status: DISCONTINUED | OUTPATIENT
Start: 2018-03-28 | End: 2018-03-31

## 2018-03-28 RX ORDER — ASPIRIN/CALCIUM CARB/MAGNESIUM 324 MG
81 TABLET ORAL DAILY
Qty: 0 | Refills: 0 | Status: DISCONTINUED | OUTPATIENT
Start: 2018-03-28 | End: 2018-03-28

## 2018-03-28 RX ORDER — HEPARIN SODIUM 5000 [USP'U]/ML
5000 INJECTION INTRAVENOUS; SUBCUTANEOUS ONCE
Qty: 0 | Refills: 0 | Status: COMPLETED | OUTPATIENT
Start: 2018-03-28 | End: 2018-03-28

## 2018-03-28 RX ORDER — PANTOPRAZOLE SODIUM 20 MG/1
40 TABLET, DELAYED RELEASE ORAL
Qty: 0 | Refills: 0 | Status: DISCONTINUED | OUTPATIENT
Start: 2018-03-28 | End: 2018-04-01

## 2018-03-28 RX ORDER — SENNA PLUS 8.6 MG/1
2 TABLET ORAL AT BEDTIME
Qty: 0 | Refills: 0 | Status: DISCONTINUED | OUTPATIENT
Start: 2018-03-28 | End: 2018-04-01

## 2018-03-28 RX ORDER — ACETAMINOPHEN 500 MG
1000 TABLET ORAL ONCE
Qty: 0 | Refills: 0 | Status: COMPLETED | OUTPATIENT
Start: 2018-03-28 | End: 2018-03-28

## 2018-03-28 RX ORDER — INFLUENZA VIRUS VACCINE 15; 15; 15; 15 UG/.5ML; UG/.5ML; UG/.5ML; UG/.5ML
0.5 SUSPENSION INTRAMUSCULAR ONCE
Qty: 0 | Refills: 0 | Status: DISCONTINUED | OUTPATIENT
Start: 2018-03-28 | End: 2018-04-01

## 2018-03-28 RX ORDER — ASPIRIN/CALCIUM CARB/MAGNESIUM 324 MG
81 TABLET ORAL DAILY
Qty: 0 | Refills: 0 | Status: DISCONTINUED | OUTPATIENT
Start: 2018-03-29 | End: 2018-04-01

## 2018-03-28 RX ORDER — ATORVASTATIN CALCIUM 80 MG/1
20 TABLET, FILM COATED ORAL AT BEDTIME
Qty: 0 | Refills: 0 | Status: DISCONTINUED | OUTPATIENT
Start: 2018-03-28 | End: 2018-04-01

## 2018-03-28 RX ORDER — DOCUSATE SODIUM 100 MG
100 CAPSULE ORAL THREE TIMES A DAY
Qty: 0 | Refills: 0 | Status: DISCONTINUED | OUTPATIENT
Start: 2018-03-28 | End: 2018-04-01

## 2018-03-28 RX ORDER — ISOSORBIDE MONONITRATE 60 MG/1
30 TABLET, EXTENDED RELEASE ORAL DAILY
Qty: 0 | Refills: 0 | Status: DISCONTINUED | OUTPATIENT
Start: 2018-03-28 | End: 2018-04-01

## 2018-03-28 RX ORDER — HEPARIN SODIUM 5000 [USP'U]/ML
5000 INJECTION INTRAVENOUS; SUBCUTANEOUS EVERY 8 HOURS
Qty: 0 | Refills: 0 | Status: DISCONTINUED | OUTPATIENT
Start: 2018-03-28 | End: 2018-04-01

## 2018-03-28 RX ORDER — MAGNESIUM SULFATE 500 MG/ML
2 VIAL (ML) INJECTION ONCE
Qty: 0 | Refills: 0 | Status: COMPLETED | OUTPATIENT
Start: 2018-03-28 | End: 2018-03-28

## 2018-03-28 RX ORDER — ASPIRIN/CALCIUM CARB/MAGNESIUM 324 MG
1 TABLET ORAL
Qty: 0 | Refills: 0 | COMMUNITY

## 2018-03-28 RX ADMIN — Medication 81 MILLIGRAM(S): at 07:19

## 2018-03-28 RX ADMIN — SODIUM CHLORIDE 30 MILLILITER(S): 9 INJECTION, SOLUTION INTRAVENOUS at 20:15

## 2018-03-28 RX ADMIN — Medication 50 GRAM(S): at 20:15

## 2018-03-28 RX ADMIN — SODIUM CHLORIDE 30 MILLILITER(S): 9 INJECTION, SOLUTION INTRAVENOUS at 12:30

## 2018-03-28 RX ADMIN — HEPARIN SODIUM 5000 UNIT(S): 5000 INJECTION INTRAVENOUS; SUBCUTANEOUS at 22:26

## 2018-03-28 RX ADMIN — HEPARIN SODIUM 5000 UNIT(S): 5000 INJECTION INTRAVENOUS; SUBCUTANEOUS at 16:08

## 2018-03-28 RX ADMIN — Medication 1000 MILLIGRAM(S): at 19:00

## 2018-03-28 RX ADMIN — HEPARIN SODIUM 5000 UNIT(S): 5000 INJECTION INTRAVENOUS; SUBCUTANEOUS at 07:16

## 2018-03-28 RX ADMIN — ATORVASTATIN CALCIUM 20 MILLIGRAM(S): 80 TABLET, FILM COATED ORAL at 22:26

## 2018-03-28 RX ADMIN — Medication 400 MILLIGRAM(S): at 18:30

## 2018-03-28 RX ADMIN — Medication 3 MILLILITER(S): at 19:10

## 2018-03-28 RX ADMIN — SENNA PLUS 2 TABLET(S): 8.6 TABLET ORAL at 22:26

## 2018-03-28 RX ADMIN — Medication 100 MILLIGRAM(S): at 22:26

## 2018-03-28 RX ADMIN — SODIUM CHLORIDE 30 MILLILITER(S): 9 INJECTION, SOLUTION INTRAVENOUS at 07:15

## 2018-03-28 NOTE — ASU PATIENT PROFILE, ADULT - PSH
History of ear surgery    S/P primary angioplasty with coronary stent  5/17 - [facility] Lot [id number]

## 2018-03-28 NOTE — PATIENT PROFILE ADULT. - PSH
History of ear surgery    S/P primary angioplasty with coronary stent  5/17 - [facility] Lot [id number]

## 2018-03-28 NOTE — PATIENT PROFILE ADULT. - NS TRANSFER DENTURES
Upper/Partial
Detail Level: Detailed
Consent: The patient's consent was obtained including but not limited to risks of crusting, scabbing, blistering, scarring, darker or lighter pigmentary change, recurrence, incomplete removal and infection.
Duration Of Freeze Thaw-Cycle (Seconds): 0
Post-Care Instructions: I reviewed with the patient in detail post-care instructions. Patient is to wear sunprotection, and avoid picking at any of the treated lesions. Pt may apply Vaseline to crusted or scabbing areas.
Render Post-Care Instructions In Note?: no

## 2018-03-28 NOTE — PATIENT PROFILE ADULT. - PMH
AAA (abdominal aortic aneurysm)    Coronary insufficiency    HLD (hyperlipidemia)    HTN (hypertension)    Lung nodule    MI (myocardial infarction)    Smoking

## 2018-03-28 NOTE — PROGRESS NOTE ADULT - SUBJECTIVE AND OBJECTIVE BOX
OLSZEWSKIINÉS SINGH                     [id number]    HPI:  Pt is a 67 yr old male scheduled for right Video Assisted Thoracoscopy Robotic Assisted Right Upper Lobe Wedge Resection Possible Right Upper Lobectomy wt Dr Mejía 3/26/18 - pt speaks Polish and translation done by daughter who states that surgery has been changed to 3/28/18. Pt hx of CAD stent placed 5/17 and AAA that is under observation. Pt c/o of wheezing and CXR done 3 weeks ago wt CT Scan and PET scan. Pt hx of HTN and smoking (21 Mar 2018 10:28)      Procedure:  Robotic RVATS, RUL wedge, completion RUL Lobectomy, RML wedge(final margin ), MLND  POD # : 0    Issues:  post op pain  Lung nodule  AAA (abdominal aortic aneurysm)  MI (myocardial infarction)  Coronary insufficiency  HLD (hyperlipidemia)  HTN (hypertension)      PAST MEDICAL & SURGICAL HISTORY:  Smoking  Lung nodule  AAA (abdominal aortic aneurysm)  MI (myocardial infarction)  Coronary insufficiency  HLD (hyperlipidemia)  HTN (hypertension)  History of ear surgery  S/P primary angioplasty with coronary stent: 5/17 - [facility] Lot [id number]            VITAL SIGNS:  Vital Signs Last 24 Hrs  T(C): 36.7 (28 Mar 2018 06:18), Max: 36.7 (28 Mar 2018 06:18)  T(F): 98.1 (28 Mar 2018 06:18), Max: 98.1 (28 Mar 2018 06:18)  HR: 51 (28 Mar 2018 06:18) (51 - 51)  BP: 147/73 (28 Mar 2018 06:18) (147/73 - 147/73)  BP(mean): --  RR: 16 (28 Mar 2018 06:18) (16 - 16)  SpO2: 97% (28 Mar 2018 06:18) (97% - 97%)    I/Os:   I&O's Detail      CAPILLARY BLOOD GLUCOSE          =======================MEDICATIONS===================  MEDICATIONS  (STANDING):  ALBUTerol/ipratropium for Nebulization 3 milliLiter(s) Nebulizer every 6 hours  atorvastatin 20 milliGRAM(s) Oral at bedtime  docusate sodium 100 milliGRAM(s) Oral three times a day  heparin  Injectable 5000 Unit(s) SubCutaneous every 8 hours  isosorbide   mononitrate ER Tablet (IMDUR) 30 milliGRAM(s) Oral daily  lactated ringers. 1000 milliLiter(s) (30 mL/Hr) IV Continuous <Continuous>  pantoprazole    Tablet 40 milliGRAM(s) Oral before breakfast  senna 2 Tablet(s) Oral at bedtime    MEDICATIONS  (PRN):        =======================PATIENT CARE ACCESS DEVICES===================  Peripheral IV      PHYSICAL EXAM============================  General:                         Awake, alert, not in any distress  Neuro:                            Moving all extremities to commands.   Respiratory:	Air entry fair and  bilateral conducted sounds                                         chest tube to suction.  CV:		Regular rate and rhythm. Normal S1/S2                                          Distal pulses present.  Abdomen:	                     Soft, non-distended. Bowel sounds present   Skin:		No rash.  Extremities:	Warm, no cyanosis or edema.  Palpable pulses        =============================NEUROLOGY============================  Pain control with PCA  / Tylenol IV     ==============================RESPIRATORY========================  Pt is on 2 L nasal canula   Comfortable, not in any distress.  Using incentive spirometry   Monitor chest tube output  Chest tube to suction 	  Continue bronchodilators, pulmonary toilet    ============================CARDIOVASCULAR======================  Continue hemodynamic monitoring.  Not on any pressors  Cont ASA, Hold Plavix     =====================RENAL===================  Continue LR 30CC/hr    Monitor I/Os and electrolytes    ====================GASTROINTESTINAL===================  On clears, tolerating  Continue GI prophylaxis with  Protonix  Continue Zofran / Reglan for nausea - PRN	    ========================HEMATOLOGIC/ONCOLOGIC====================  Monitor chest tube output. No signs of active bleeding.   Follow CBC in AM    ============================INFECTIOUS DISEASE========================  Monitor for fever / leukocytosis.  All surgical incision / chest tube  sites look clean      Pt is on GI & DVT prophylaxis  OOB & ambulate       Pertinent clinical, laboratory, radiographic, hemodynamic, echocardiographic, respiratory data, microbiologic data and chart were reviewed and analyzed frequently throughout the course of the day and night  Patient seen, examined and plan discussed with CT Surgery / CTICU team during rounds.    Pt's status discussed with family at bedside, updated status        Nghia Saldivar DO, FACEP

## 2018-03-28 NOTE — PATIENT PROFILE ADULT. - ABILITY TO HEAR (WITH HEARING AID OR HEARING APPLIANCE IF NORMALLY USED):
Mildly to Moderately Impaired: difficulty hearing in some environments or speaker may need to increase volume or speak distinctly/right

## 2018-03-28 NOTE — BRIEF OPERATIVE NOTE - PROCEDURE
<<-----Click on this checkbox to enter Procedure Robotic assistance in thoracoscopic procedure  03/28/2018  Robotic RVATS, RUL wedge, completion RUL Lobectomy, RML wedge(final margin ), MLND  Active  [email or web address]

## 2018-03-29 ENCOUNTER — TRANSCRIPTION ENCOUNTER (OUTPATIENT)
Age: 68
End: 2018-03-29

## 2018-03-29 LAB
BASOPHILS # BLD AUTO: 0.01 K/UL — SIGNIFICANT CHANGE UP (ref 0–0.2)
BASOPHILS NFR BLD AUTO: 0.1 % — SIGNIFICANT CHANGE UP (ref 0–2)
BUN SERPL-MCNC: 18 MG/DL — SIGNIFICANT CHANGE UP (ref 7–23)
CALCIUM SERPL-MCNC: 8.6 MG/DL — SIGNIFICANT CHANGE UP (ref 8.4–10.5)
CHLORIDE SERPL-SCNC: 100 MMOL/L — SIGNIFICANT CHANGE UP (ref 98–107)
CO2 SERPL-SCNC: 27 MMOL/L — SIGNIFICANT CHANGE UP (ref 22–31)
CREAT SERPL-MCNC: 0.75 MG/DL — SIGNIFICANT CHANGE UP (ref 0.5–1.3)
EOSINOPHIL # BLD AUTO: 0.01 K/UL — SIGNIFICANT CHANGE UP (ref 0–0.5)
EOSINOPHIL NFR BLD AUTO: 0.1 % — SIGNIFICANT CHANGE UP (ref 0–6)
GLUCOSE SERPL-MCNC: 151 MG/DL — HIGH (ref 70–99)
HCT VFR BLD CALC: 43.1 % — SIGNIFICANT CHANGE UP (ref 39–50)
HGB BLD-MCNC: 14.2 G/DL — SIGNIFICANT CHANGE UP (ref 13–17)
IMM GRANULOCYTES # BLD AUTO: 0.04 # — SIGNIFICANT CHANGE UP
IMM GRANULOCYTES NFR BLD AUTO: 0.3 % — SIGNIFICANT CHANGE UP (ref 0–1.5)
LYMPHOCYTES # BLD AUTO: 1.41 K/UL — SIGNIFICANT CHANGE UP (ref 1–3.3)
LYMPHOCYTES # BLD AUTO: 11 % — LOW (ref 13–44)
MCHC RBC-ENTMCNC: 31.3 PG — SIGNIFICANT CHANGE UP (ref 27–34)
MCHC RBC-ENTMCNC: 32.9 % — SIGNIFICANT CHANGE UP (ref 32–36)
MCV RBC AUTO: 95.1 FL — SIGNIFICANT CHANGE UP (ref 80–100)
MONOCYTES # BLD AUTO: 0.72 K/UL — SIGNIFICANT CHANGE UP (ref 0–0.9)
MONOCYTES NFR BLD AUTO: 5.6 % — SIGNIFICANT CHANGE UP (ref 2–14)
NEUTROPHILS # BLD AUTO: 10.6 K/UL — HIGH (ref 1.8–7.4)
NEUTROPHILS NFR BLD AUTO: 82.9 % — HIGH (ref 43–77)
NRBC # FLD: 0 — SIGNIFICANT CHANGE UP
PLATELET # BLD AUTO: 133 K/UL — LOW (ref 150–400)
PMV BLD: 11.7 FL — SIGNIFICANT CHANGE UP (ref 7–13)
POTASSIUM SERPL-MCNC: 4.7 MMOL/L — SIGNIFICANT CHANGE UP (ref 3.5–5.3)
POTASSIUM SERPL-SCNC: 4.7 MMOL/L — SIGNIFICANT CHANGE UP (ref 3.5–5.3)
RBC # BLD: 4.53 M/UL — SIGNIFICANT CHANGE UP (ref 4.2–5.8)
RBC # FLD: 14.6 % — HIGH (ref 10.3–14.5)
SODIUM SERPL-SCNC: 137 MMOL/L — SIGNIFICANT CHANGE UP (ref 135–145)
WBC # BLD: 12.79 K/UL — HIGH (ref 3.8–10.5)
WBC # FLD AUTO: 12.79 K/UL — HIGH (ref 3.8–10.5)

## 2018-03-29 PROCEDURE — 99233 SBSQ HOSP IP/OBS HIGH 50: CPT

## 2018-03-29 PROCEDURE — 71045 X-RAY EXAM CHEST 1 VIEW: CPT | Mod: 26

## 2018-03-29 RX ORDER — OXYCODONE HYDROCHLORIDE 5 MG/1
2.5 TABLET ORAL
Qty: 0 | Refills: 0 | Status: DISCONTINUED | OUTPATIENT
Start: 2018-03-29 | End: 2018-03-29

## 2018-03-29 RX ORDER — TAMSULOSIN HYDROCHLORIDE 0.4 MG/1
0.4 CAPSULE ORAL AT BEDTIME
Qty: 0 | Refills: 0 | Status: DISCONTINUED | OUTPATIENT
Start: 2018-03-29 | End: 2018-04-01

## 2018-03-29 RX ORDER — OXYCODONE HYDROCHLORIDE 5 MG/1
5 TABLET ORAL
Qty: 0 | Refills: 0 | Status: DISCONTINUED | OUTPATIENT
Start: 2018-03-29 | End: 2018-03-29

## 2018-03-29 RX ORDER — ASPIRIN/CALCIUM CARB/MAGNESIUM 324 MG
1 TABLET ORAL
Qty: 0 | Refills: 0 | COMMUNITY
Start: 2018-03-29

## 2018-03-29 RX ORDER — ACETAMINOPHEN 500 MG
1000 TABLET ORAL ONCE
Qty: 0 | Refills: 0 | Status: COMPLETED | OUTPATIENT
Start: 2018-03-29 | End: 2018-03-29

## 2018-03-29 RX ORDER — OXYCODONE HYDROCHLORIDE 5 MG/1
10 TABLET ORAL EVERY 4 HOURS
Qty: 0 | Refills: 0 | Status: DISCONTINUED | OUTPATIENT
Start: 2018-03-29 | End: 2018-04-01

## 2018-03-29 RX ORDER — OXYCODONE HYDROCHLORIDE 5 MG/1
5 TABLET ORAL EVERY 4 HOURS
Qty: 0 | Refills: 0 | Status: DISCONTINUED | OUTPATIENT
Start: 2018-03-29 | End: 2018-04-01

## 2018-03-29 RX ADMIN — OXYCODONE HYDROCHLORIDE 2.5 MILLIGRAM(S): 5 TABLET ORAL at 17:11

## 2018-03-29 RX ADMIN — ISOSORBIDE MONONITRATE 30 MILLIGRAM(S): 60 TABLET, EXTENDED RELEASE ORAL at 13:17

## 2018-03-29 RX ADMIN — Medication 100 MILLIGRAM(S): at 05:56

## 2018-03-29 RX ADMIN — Medication 3 MILLILITER(S): at 03:33

## 2018-03-29 RX ADMIN — SENNA PLUS 2 TABLET(S): 8.6 TABLET ORAL at 21:49

## 2018-03-29 RX ADMIN — ATORVASTATIN CALCIUM 20 MILLIGRAM(S): 80 TABLET, FILM COATED ORAL at 21:49

## 2018-03-29 RX ADMIN — HEPARIN SODIUM 5000 UNIT(S): 5000 INJECTION INTRAVENOUS; SUBCUTANEOUS at 05:55

## 2018-03-29 RX ADMIN — PANTOPRAZOLE SODIUM 40 MILLIGRAM(S): 20 TABLET, DELAYED RELEASE ORAL at 06:02

## 2018-03-29 RX ADMIN — Medication 100 MILLIGRAM(S): at 13:17

## 2018-03-29 RX ADMIN — Medication 100 MILLIGRAM(S): at 21:49

## 2018-03-29 RX ADMIN — HEPARIN SODIUM 5000 UNIT(S): 5000 INJECTION INTRAVENOUS; SUBCUTANEOUS at 13:18

## 2018-03-29 RX ADMIN — Medication 81 MILLIGRAM(S): at 13:17

## 2018-03-29 RX ADMIN — OXYCODONE HYDROCHLORIDE 10 MILLIGRAM(S): 5 TABLET ORAL at 22:30

## 2018-03-29 RX ADMIN — Medication 400 MILLIGRAM(S): at 09:48

## 2018-03-29 RX ADMIN — HEPARIN SODIUM 5000 UNIT(S): 5000 INJECTION INTRAVENOUS; SUBCUTANEOUS at 21:49

## 2018-03-29 RX ADMIN — OXYCODONE HYDROCHLORIDE 10 MILLIGRAM(S): 5 TABLET ORAL at 21:49

## 2018-03-29 RX ADMIN — Medication 1000 MILLIGRAM(S): at 11:30

## 2018-03-29 RX ADMIN — Medication 3 MILLILITER(S): at 10:01

## 2018-03-29 RX ADMIN — TAMSULOSIN HYDROCHLORIDE 0.4 MILLIGRAM(S): 0.4 CAPSULE ORAL at 21:49

## 2018-03-29 NOTE — PROGRESS NOTE ADULT - SUBJECTIVE AND OBJECTIVE BOX
OLSZEWSKI, WALDEMAR  MRN-[id number]    HPI:  Pt is a 67 yr old male scheduled for right Video Assisted Thoracoscopy Robotic Assisted Right Upper Lobe Wedge Resection Possible Right Upper Lobectomy wt Dr Mejía 3/26/18 - pt speaks Polish and translation done by daughter who states that surgery has been changed to 3/28/18. Pt hx of CAD stent placed 5/17 and AAA that is under observation. Pt c/o of wheezing and CXR done 3 weeks ago wt CT Scan and PET scan. Pt hx of HTN and smoking (21 Mar 2018 10:28)      Procedure:  Robotic RVATS, RUL wedge, completion RUL Lobectomy, RML wedge(final margin ), MLND  POD # : 1    Issues:  post op pain  Lung nodule  AAA (abdominal aortic aneurysm)  MI (myocardial infarction)  Coronary insufficiency  HLD (hyperlipidemia)  HTN (hypertension)      PAST MEDICAL & SURGICAL HISTORY:  Smoking  Lung nodule  AAA (abdominal aortic aneurysm)  MI (myocardial infarction)  Coronary insufficiency  HLD (hyperlipidemia)  HTN (hypertension)  History of ear surgery  S/P primary angioplasty with coronary stent: 5/17 - [facility] Lot [id number]      ***VITAL SIGNS:  Vital Signs Last 24 Hrs  T(C): 37.1 (29 Mar 2018 04:00), Max: 37.1 (28 Mar 2018 20:00)  T(F): 98.8 (29 Mar 2018 04:00), Max: 98.8 (29 Mar 2018 04:00)  HR: 63 (29 Mar 2018 05:00) (49 - 71)  BP: 149/68 (29 Mar 2018 05:00) (107/72 - 149/68)  BP(mean): 87 (29 Mar 2018 05:00) (71 - 93)  RR: 14 (29 Mar 2018 05:00) (12 - 24)  SpO2: 95% (29 Mar 2018 05:00) (93% - 100%)  I/Os:   I&O's Detail    28 Mar 2018 07:01  -  29 Mar 2018 07:00  --------------------------------------------------------  IN:    IV PiggyBack: 50 mL    lactated ringers.: 480 mL  Total IN: 530 mL    OUT:    Chest Tube: 381 mL    Indwelling Catheter - Urethral: 1645 mL  Total OUT: 2026 mL    Total NET: -1496 mL        CAPILLARY BLOOD GLUCOSE        ======================= PHYSICAL EXAM============================  General:                         Awake, alert, not in any distress  Neuro:                           Nonfocal	  Respiratory:	Lungs clear to auscultation bilaterally. Good aeration.                                        chest tube to suction  CV:		Regular rate and rhythm. Normal S1/S2. No murmurs                                          Distal pulses present.  Abdomen:	                     Soft, non-distended. Bowel sounds present  Skin:		No rash.  Extremities:	Warm, no cyanosis or edema.  Palpable pulses    ============================ LABS =========================                        14.2   12.79 )-----------( 133      ( 29 Mar 2018 04:40 )             43.1     03-29    137  |  100  |  18  ----------------------------<  151<H>  4.7   |  27  |  0.75    Ca    8.6      29 Mar 2018 04:40          =======================  MEDICATIONS  =================  MEDICATIONS  (STANDING):  ALBUTerol/ipratropium for Nebulization 3 milliLiter(s) Nebulizer every 6 hours  aspirin enteric coated 81 milliGRAM(s) Oral daily  atorvastatin 20 milliGRAM(s) Oral at bedtime  docusate sodium 100 milliGRAM(s) Oral three times a day  heparin  Injectable 5000 Unit(s) SubCutaneous every 8 hours  influenza   Vaccine 0.5 milliLiter(s) IntraMuscular once  isosorbide   mononitrate ER Tablet (IMDUR) 30 milliGRAM(s) Oral daily  lactated ringers. 1000 milliLiter(s) (30 mL/Hr) IV Continuous <Continuous>  pantoprazole    Tablet 40 milliGRAM(s) Oral before breakfast  senna 2 Tablet(s) Oral at bedtime    MEDICATIONS  (PRN):      ====================== NEUROLOGY=====================  Pain control with  Tylenol IV   ==================== RESPIRATORY======================  Pt is on    2  L nasal canula  Comfortable, not in any distress.  Using incentive spirometry   Monitor chest tube output  Chest tube to suction  Continue bronchodilators, pulmonary toilet    ====================CARDIOVASCULAR==================  Continue hemodynamic monitoring.  Not on any pressors    ===================== RENAL =========================  Continue  IVF  Monitor I/Os and electrolytes    ==================== GASTROINTESTINAL===================  On regular diet, tolerating  Continue GI prophylaxis with  Protonix  Continue Zofran / Reglan for nausea - PRN	    =======================    ENDOCRIN  =====================  Glycemic monitoring  F/S with coverage  ===================HEMATOLOGIC/ONC ===================  Monitor chest tube output. No signs of active bleeding.   Follow CBC in AM  DVT prophylaxis with SCD, sc Heparin    ========================INFECTIOUS DISEASE================  No signs of infection. Monitor for fever / leukocytosis.  All surgical incision / chest tube  sites look clean  D/C Umana      Pertinent clinical, laboratory, radiographic, hemodynamic, echocardiographic, respiratory data, microbiologic data and chart were reviewed and analyzed frequently throughout the course of the day and night. GI and DVT prophylaxis, glycemic control, head of bed elevation and skin care issues were addressed.  Patient seen, examined and plan discussed with CT Surgery / CTICU team during rounds.        Qiuping Yariel BROWN, GARCIA

## 2018-03-29 NOTE — PROGRESS NOTE ADULT - SUBJECTIVE AND OBJECTIVE BOX
ANESTHESIA POSTOP CHECK    67y Male POSTOP DAY 1     Vital Signs Last 24 Hrs  T(C): 36.8 (29 Mar 2018 11:00), Max: 37.1 (28 Mar 2018 20:00)  T(F): 98.2 (29 Mar 2018 11:00), Max: 98.8 (29 Mar 2018 04:00)  HR: 57 (29 Mar 2018 11:00) (49 - 71)  BP: 131/54 (29 Mar 2018 11:00) (106/68 - 149/68)  BP(mean): 77 (29 Mar 2018 10:00) (71 - 93)  RR: 19 (29 Mar 2018 11:00) (13 - 24)  SpO2: 96% (29 Mar 2018 11:00) (93% - 100%)  I&O's Summary          [x ] NO APPARENT ANESTHESIA COMPLICATIONS      Comments:

## 2018-03-30 LAB
APPEARANCE UR: CLEAR — SIGNIFICANT CHANGE UP
BILIRUB UR-MCNC: NEGATIVE — SIGNIFICANT CHANGE UP
BLOOD UR QL VISUAL: NEGATIVE — SIGNIFICANT CHANGE UP
BUN SERPL-MCNC: 21 MG/DL — SIGNIFICANT CHANGE UP (ref 7–23)
CALCIUM SERPL-MCNC: 8.7 MG/DL — SIGNIFICANT CHANGE UP (ref 8.4–10.5)
CHLORIDE SERPL-SCNC: 102 MMOL/L — SIGNIFICANT CHANGE UP (ref 98–107)
CO2 SERPL-SCNC: 28 MMOL/L — SIGNIFICANT CHANGE UP (ref 22–31)
COLOR SPEC: YELLOW — SIGNIFICANT CHANGE UP
CREAT SERPL-MCNC: 0.86 MG/DL — SIGNIFICANT CHANGE UP (ref 0.5–1.3)
GLUCOSE SERPL-MCNC: 155 MG/DL — HIGH (ref 70–99)
GLUCOSE UR-MCNC: 250 — SIGNIFICANT CHANGE UP
HCT VFR BLD CALC: 39.5 % — SIGNIFICANT CHANGE UP (ref 39–50)
HGB BLD-MCNC: 13.4 G/DL — SIGNIFICANT CHANGE UP (ref 13–17)
KETONES UR-MCNC: NEGATIVE — SIGNIFICANT CHANGE UP
LEUKOCYTE ESTERASE UR-ACNC: NEGATIVE — SIGNIFICANT CHANGE UP
MCHC RBC-ENTMCNC: 31.8 PG — SIGNIFICANT CHANGE UP (ref 27–34)
MCHC RBC-ENTMCNC: 33.9 % — SIGNIFICANT CHANGE UP (ref 32–36)
MCV RBC AUTO: 93.6 FL — SIGNIFICANT CHANGE UP (ref 80–100)
MUCOUS THREADS # UR AUTO: SIGNIFICANT CHANGE UP
NITRITE UR-MCNC: NEGATIVE — SIGNIFICANT CHANGE UP
NRBC # FLD: 0 — SIGNIFICANT CHANGE UP
PH UR: 5.5 — SIGNIFICANT CHANGE UP (ref 4.6–8)
PLATELET # BLD AUTO: 126 K/UL — LOW (ref 150–400)
PMV BLD: 12 FL — SIGNIFICANT CHANGE UP (ref 7–13)
POTASSIUM SERPL-MCNC: 4.4 MMOL/L — SIGNIFICANT CHANGE UP (ref 3.5–5.3)
POTASSIUM SERPL-SCNC: 4.4 MMOL/L — SIGNIFICANT CHANGE UP (ref 3.5–5.3)
PROT UR-MCNC: NEGATIVE MG/DL — SIGNIFICANT CHANGE UP
RBC # BLD: 4.22 M/UL — SIGNIFICANT CHANGE UP (ref 4.2–5.8)
RBC # FLD: 14.7 % — HIGH (ref 10.3–14.5)
RBC CASTS # UR COMP ASSIST: SIGNIFICANT CHANGE UP (ref 0–?)
SODIUM SERPL-SCNC: 140 MMOL/L — SIGNIFICANT CHANGE UP (ref 135–145)
SP GR SPEC: 1.02 — SIGNIFICANT CHANGE UP (ref 1–1.04)
UROBILINOGEN FLD QL: NORMAL MG/DL — SIGNIFICANT CHANGE UP
WBC # BLD: 10.73 K/UL — HIGH (ref 3.8–10.5)
WBC # FLD AUTO: 10.73 K/UL — HIGH (ref 3.8–10.5)
WBC UR QL: SIGNIFICANT CHANGE UP (ref 0–?)

## 2018-03-30 PROCEDURE — 71045 X-RAY EXAM CHEST 1 VIEW: CPT | Mod: 26

## 2018-03-30 RX ORDER — METOPROLOL TARTRATE 50 MG
25 TABLET ORAL EVERY 8 HOURS
Qty: 0 | Refills: 0 | Status: DISCONTINUED | OUTPATIENT
Start: 2018-03-30 | End: 2018-03-31

## 2018-03-30 RX ORDER — METOPROLOL TARTRATE 50 MG
5 TABLET ORAL ONCE
Qty: 0 | Refills: 0 | Status: COMPLETED | OUTPATIENT
Start: 2018-03-30 | End: 2018-03-30

## 2018-03-30 RX ORDER — CLOPIDOGREL BISULFATE 75 MG/1
75 TABLET, FILM COATED ORAL DAILY
Qty: 0 | Refills: 0 | Status: DISCONTINUED | OUTPATIENT
Start: 2018-03-30 | End: 2018-04-01

## 2018-03-30 RX ADMIN — Medication 25 MILLIGRAM(S): at 21:40

## 2018-03-30 RX ADMIN — Medication 3 MILLILITER(S): at 04:16

## 2018-03-30 RX ADMIN — SENNA PLUS 2 TABLET(S): 8.6 TABLET ORAL at 21:40

## 2018-03-30 RX ADMIN — OXYCODONE HYDROCHLORIDE 5 MILLIGRAM(S): 5 TABLET ORAL at 14:08

## 2018-03-30 RX ADMIN — Medication 100 MILLIGRAM(S): at 14:05

## 2018-03-30 RX ADMIN — TAMSULOSIN HYDROCHLORIDE 0.4 MILLIGRAM(S): 0.4 CAPSULE ORAL at 21:40

## 2018-03-30 RX ADMIN — Medication 5 MILLIGRAM(S): at 16:46

## 2018-03-30 RX ADMIN — HEPARIN SODIUM 5000 UNIT(S): 5000 INJECTION INTRAVENOUS; SUBCUTANEOUS at 21:39

## 2018-03-30 RX ADMIN — Medication 3 MILLILITER(S): at 10:30

## 2018-03-30 RX ADMIN — HEPARIN SODIUM 5000 UNIT(S): 5000 INJECTION INTRAVENOUS; SUBCUTANEOUS at 05:08

## 2018-03-30 RX ADMIN — ATORVASTATIN CALCIUM 20 MILLIGRAM(S): 80 TABLET, FILM COATED ORAL at 21:40

## 2018-03-30 RX ADMIN — Medication 3 MILLILITER(S): at 23:13

## 2018-03-30 RX ADMIN — Medication 3 MILLILITER(S): at 15:24

## 2018-03-30 RX ADMIN — HEPARIN SODIUM 5000 UNIT(S): 5000 INJECTION INTRAVENOUS; SUBCUTANEOUS at 14:03

## 2018-03-30 RX ADMIN — Medication 100 MILLIGRAM(S): at 21:40

## 2018-03-30 RX ADMIN — CLOPIDOGREL BISULFATE 75 MILLIGRAM(S): 75 TABLET, FILM COATED ORAL at 16:39

## 2018-03-30 RX ADMIN — ISOSORBIDE MONONITRATE 30 MILLIGRAM(S): 60 TABLET, EXTENDED RELEASE ORAL at 12:15

## 2018-03-30 RX ADMIN — OXYCODONE HYDROCHLORIDE 5 MILLIGRAM(S): 5 TABLET ORAL at 15:20

## 2018-03-30 RX ADMIN — Medication 100 MILLIGRAM(S): at 05:08

## 2018-03-30 RX ADMIN — Medication 81 MILLIGRAM(S): at 12:15

## 2018-03-30 RX ADMIN — PANTOPRAZOLE SODIUM 40 MILLIGRAM(S): 20 TABLET, DELAYED RELEASE ORAL at 05:08

## 2018-03-30 RX ADMIN — Medication 25 MILLIGRAM(S): at 16:39

## 2018-03-30 NOTE — DISCHARGE NOTE ADULT - ADDITIONAL INSTRUCTIONS
Follow up with Dr Mejía in 2 weeks. Call for an appointment. Bring a new chest X-ray with you.  Follow up with cardiology; Follow up with Urology.  keep the Umana to the leg bag and drain it as you were instructed to do.  Monitor the wound for pus and redness and if noted, call Dr Mejía.  Some drainage is normal.  Keep the wound clean with soap and water and dry it well after washing. You may remove the dressing and take a shower allowing warm water to run over incision pat dry and leave to air. Continue with daily ambulation and use of incentive spirometer. Please call the office at  if you experience an increase in shortness of breath, fever, purulent discharge from site of incision and pain not relieved by medication or rest.    The johnson must remain in until following up with a urologist, a prescription for flomax is provided take it daily.

## 2018-03-30 NOTE — DISCHARGE NOTE ADULT - PLAN OF CARE
Wound healing; Development of a treatment plan based on the final pathology Stable; Follow up final pathology Follow up with Dr. Mejía in 2 weeks, you must call  to make an appointment. A prescription for chest x-ray is provided, have it completed prior to seeing Dr. Mejía  Follow up with Dr. Escobedo about heart rhythm within a week   Follow up with urology by calling , their office is located across the hospitals at [address] [place] [address]

## 2018-03-30 NOTE — DISCHARGE NOTE ADULT - CONDITIONS AT DISCHARGE
Patient alert and oriented x4. Able to make needs known. RAM x4. VSS. Denies c/o pain or discomfort . Tolerating PO diet. Denies c/o nausea or vomitting. Ambulating without difficulty. PAtient Polish speaking only. Daughter at bedside to translate for discharge.

## 2018-03-30 NOTE — DISCHARGE NOTE ADULT - PATIENT PORTAL LINK FT
You can access the [email or web address], offered by [facility], by registering with the following website: [email or web address]

## 2018-03-30 NOTE — DISCHARGE NOTE ADULT - CARE PLAN
Principal Discharge DX:	Lung nodule  Goal:	Wound healing; Development of a treatment plan based on the final pathology  Assessment and plan of treatment:	Stable; Follow up final pathology Principal Discharge DX:	Lung nodule  Goal:	Wound healing; Development of a treatment plan based on the final pathology  Assessment and plan of treatment:	Follow up with Dr. Mejía in 2 weeks, you must call  to make an appointment. A prescription for chest x-ray is provided, have it completed prior to seeing Dr. Mejía  Follow up with Dr. Escobedo about heart rhythm within a week   Follow up with urology by calling , their office is located across the [facility] at [address] [place] [address]

## 2018-03-30 NOTE — DISCHARGE NOTE ADULT - CARE PROVIDERS DIRECT ADDRESSES
,DirectAddress_Unknown,[email or web address] ,DirectAddress_Unknown,[email or web address],DirectAddress_Unknown,DirectAddress_Unknown

## 2018-03-30 NOTE — PROGRESS NOTE ADULT - SUBJECTIVE AND OBJECTIVE BOX
Subjective: pt admits voiding small amounts frequently, straight cath yesterday 600cc drained - pt refused johnson, on flomax now, symptoms slowly resolving    Vital Signs:  Vital Signs Last 24 Hrs  T(C): 37.1 (03-30-18 @ 12:16), Max: 37.3 (03-29-18 @ 17:07)  T(F): 98.7 (03-30-18 @ 12:16), Max: 99.1 (03-29-18 @ 17:07)  HR: 74 (03-30-18 @ 12:16) (64 - 76)  BP: 135/65 (03-30-18 @ 12:16) (118/64 - 153/80)  RR: 19 (03-30-18 @ 12:16) (18 - 20)  SpO2: 96% (03-30-18 @ 12:16) (94% - 98%) on (O2)    Telemetry/Alarms:  General: WN/WD NAD  Neurology: Awake, nonfocal, RAM x 4  Eyes: Scleras clear, PERRLA/ EOMI, Gross vision intact  ENT:Gross hearing intact, grossly patent pharynx, no stridor  Neck: Neck supple, trachea midline, No JVD,   Respiratory: CTA B/L, No wheezing, rales, rhonchi  CV: RRR, S1S2, no murmurs, rubs or gallops  Abdominal: Soft, NT, ND +BS,   Extremities: No edema, + peripheral pulses  Skin: No Rashes, Hematoma, Ecchymosis  Lymphatic: No Neck, axilla, groin LAD  Psych: Oriented x 3, normal affect  Incisions:   Tubes:  Relevant labs, radiology and Medications reviewed                        13.4   10.73 )-----------( 126      ( 30 Mar 2018 06:57 )             39.5     03-30    140  |  102  |  21  ----------------------------<  155<H>  4.4   |  28  |  0.86    Ca    8.7      30 Mar 2018 06:51        MEDICATIONS  (STANDING):  ALBUTerol/ipratropium for Nebulization 3 milliLiter(s) Nebulizer every 6 hours  aspirin enteric coated 81 milliGRAM(s) Oral daily  atorvastatin 20 milliGRAM(s) Oral at bedtime  docusate sodium 100 milliGRAM(s) Oral three times a day  heparin  Injectable 5000 Unit(s) SubCutaneous every 8 hours  influenza   Vaccine 0.5 milliLiter(s) IntraMuscular once  isosorbide   mononitrate ER Tablet (IMDUR) 30 milliGRAM(s) Oral daily  pantoprazole    Tablet 40 milliGRAM(s) Oral before breakfast  senna 2 Tablet(s) Oral at bedtime  tamsulosin 0.4 milliGRAM(s) Oral at bedtime    MEDICATIONS  (PRN):  oxyCODONE    IR 5 milliGRAM(s) Oral every 4 hours PRN Moderate Pain (4 - 6)  oxyCODONE    IR 10 milliGRAM(s) Oral every 4 hours PRN Severe Pain (7 - 10)    Pertinent Physical Exam  I&O's Summary    29 Mar 2018 07:01  -  30 Mar 2018 07:00  --------------------------------------------------------  IN: 830 mL / OUT: 1280 mL / NET: -450 mL    30 Mar 2018 07:01  -  30 Mar 2018 15:40  --------------------------------------------------------  IN: 320 mL / OUT: 300 mL / NET: 20 mL        Assessment  67y Male  w/ PAST MEDICAL & SURGICAL HISTORY:  Smoking  Lung nodule  AAA (abdominal aortic aneurysm)  MI (myocardial infarction)  Coronary insufficiency  HLD (hyperlipidemia)  HTN (hypertension)  History of ear surgery  S/P primary angioplasty with coronary stent: 5/17 - [facility] RX Lot [id number]  admitted with complaints of Patient is a 67y old  Male who presents with a chief complaint of right lung nodule (30 Mar 2018 01:52)  .  On (Date), patient underwent Robotic assistance in thoracoscopic procedure  . Postoperative course/issues:    PLAN  Neuro: Pain management  Pulm: Encourage coughing, deep breathing and use of incentive spirometry. Wean off supplemental oxygen as able. Daily CXR.   Cardio: Monitor telemetry/alarms  GI: Tolerating diet. Continue stool softeners.  Renal: monitor urine output, supplement electrolytes as needed  Vasc: Heparin SC/SCDs for DVT prophylaxis  Heme: Stable H/H. .   ID: Off antibiotics. Stable.  Therapy: OOB/ambulate  Tubes: Monitor Chest tube output  Disposition: Aim to D/C to home on  Discussed with Cardiothoracic Team at AM rounds. Subjective: pt admits voiding small amounts frequently, straight cath yesterday 600cc drained - pt refused johnson, on flomax now, symptoms again today - johnson placed and 600cc drained; johnson to leg bag; pt went into NICO and pt started on lopressor    Vital Signs:  Vital Signs Last 24 Hrs  T(C): 37.1 (03-30-18 @ 12:16), Max: 37.3 (03-29-18 @ 17:07)  T(F): 98.7 (03-30-18 @ 12:16), Max: 99.1 (03-29-18 @ 17:07)  HR: 74 (03-30-18 @ 12:16) (64 - 76)  BP: 135/65 (03-30-18 @ 12:16) (118/64 - 153/80)  RR: 19 (03-30-18 @ 12:16) (18 - 20)  SpO2: 96% (03-30-18 @ 12:16) (94% - 98%) on (O2)    Telemetry/Alarms:  General: WN/WD NAD  Neurology: Awake, nonfocal, RAM x 4  Eyes: Scleras clear, PERRLA/ EOMI, Gross vision intact  ENT:Gross hearing intact, grossly patent pharynx, no stridor  Neck: Neck supple, trachea midline, No JVD,   Respiratory: CTA B/L, No wheezing, rales, rhonchi  CV: RRR, S1S2, no murmurs, rubs or gallops  Abdominal: Soft, NT, ND +BS,   Extremities: No edema, + peripheral pulses  Skin: No Rashes, Hematoma, Ecchymosis  Lymphatic: No Neck, axilla, groin LAD  Psych: Oriented x 3, normal affect  Incisions: c,d,i  Tubes:200cc waterseal no air leak  Relevant labs, radiology and Medications reviewed                        13.4   10.73 )-----------( 126      ( 30 Mar 2018 06:57 )             39.5     03-30    140  |  102  |  21  ----------------------------<  155<H>  4.4   |  28  |  0.86    Ca    8.7      30 Mar 2018 06:51        MEDICATIONS  (STANDING):  ALBUTerol/ipratropium for Nebulization 3 milliLiter(s) Nebulizer every 6 hours  aspirin enteric coated 81 milliGRAM(s) Oral daily  atorvastatin 20 milliGRAM(s) Oral at bedtime  docusate sodium 100 milliGRAM(s) Oral three times a day  heparin  Injectable 5000 Unit(s) SubCutaneous every 8 hours  influenza   Vaccine 0.5 milliLiter(s) IntraMuscular once  isosorbide   mononitrate ER Tablet (IMDUR) 30 milliGRAM(s) Oral daily  pantoprazole    Tablet 40 milliGRAM(s) Oral before breakfast  senna 2 Tablet(s) Oral at bedtime  tamsulosin 0.4 milliGRAM(s) Oral at bedtime    MEDICATIONS  (PRN):  oxyCODONE    IR 5 milliGRAM(s) Oral every 4 hours PRN Moderate Pain (4 - 6)  oxyCODONE    IR 10 milliGRAM(s) Oral every 4 hours PRN Severe Pain (7 - 10)    Pertinent Physical Exam  I&O's Summary    29 Mar 2018 07:01  -  30 Mar 2018 07:00  --------------------------------------------------------  IN: 830 mL / OUT: 1280 mL / NET: -450 mL    30 Mar 2018 07:01  -  30 Mar 2018 15:40  --------------------------------------------------------  IN: 320 mL / OUT: 300 mL / NET: 20 mL        Assessment  67y Male  w/ PAST MEDICAL & SURGICAL HISTORY:  Smoking  Lung nodule  AAA (abdominal aortic aneurysm)  MI (myocardial infarction)  Coronary insufficiency  HLD (hyperlipidemia)  HTN (hypertension)  History of ear surgery  S/P primary angioplasty with coronary stent: 5/17 - [facility] RX Lot [id number]  admitted with complaints of Patient is a 67y old  Male who presents with a chief complaint of right lung nodule (30 Mar 2018 01:52)  .  On (3/28/18), patient underwent Robotic assistance in thoracoscopic procedure RUL lobectomy  . Postoperative course/issues: Right chest tube removed per protocol, pt tolerated well - urgent CXR ordered; johnson placed for retention  pt went into NICO started on lopressor (takes beta blocker at home)    PLAN  Neuro: Pain management  Pulm: Encourage coughing, deep breathing and use of incentive spirometry. Wean off supplemental oxygen as able. Daily CXR.   Cardio: Monitor telemetry/alarms; NICO started lopressor, on ASA, plavix  GI: Tolerating diet. Continue stool softeners.  Renal: monitor urine output, supplement electrolytes as needed; johnson for retention  Vasc: Heparin SC/SCDs for DVT prophylaxis  Heme: Stable H/H. .   ID: Off antibiotics. Stable.  Therapy: OOB/ambulate  Tubes: chest tube removed per protocol; urgent CXR ordered  Disposition: Aim to D/C to home once heart rate/rhythm controlled  Discussed with Cardiothoracic Team at AM rounds.

## 2018-03-30 NOTE — DISCHARGE NOTE ADULT - CARE PROVIDER_API CALL
Jarad Cheema (DO), Family Medicine  [facility] [place] [address]  Phone: [phone]  Fax: [phone]    Williams Mejía (MD), Surgery; Thoracic Surgery  [address] [place] [address]  Phone: [phone]  Fax: [phone] Jarad Cheema (DO), Family Medicine  [address] [place] [address]  Phone: [phone]  Fax: [phone]    Williams Mejía (MD), Surgery; Thoracic Surgery  [address] [place] [address]  Phone: [phone]  Fax: [phone]    cardiology,   Phone: (   )    -  Fax: (   )    -    Urology,   Phone: (   )    -  Fax: (   )    -

## 2018-03-30 NOTE — DISCHARGE NOTE ADULT - NS AS ACTIVITY OBS
Do not drive or operate machinery/No Heavy lifting/straining/Walking-Indoors allowed/Walking-Outdoors allowed/Stairs allowed/Sex allowed/Showering allowed/Driving allowed

## 2018-03-30 NOTE — DISCHARGE NOTE ADULT - HOSPITAL COURSE
67 yr old male underwent a right VATS, Robotic-Assisted Right Middle Lobe Wedge Resection and a Right Upper Lobectomy with Dr Mejía on 3/28/18.   Pt had c/o of wheezing and a CXR, CT Scan, and PET scan confirmed a right lung lesion.  Post-op, he had an air leak and increased chest tube outputs.  When both these issues resolved, the chest tube was removed for discharge home. 67 yr old male underwent a right VATS, Robotic-Assisted Right Middle Lobe Wedge Resection and a Right Upper Lobectomy with Dr Mejía on 3/28/18.   Pt had c/o of wheezing and a CXR, CT Scan, and PET scan confirmed a right lung lesion.  Post-operative course was complicated by urinary retention w/ x2 failed TOV and 24 hours of rate controlled a-fib. Currently he is back in sinus rhythm w/ PVCs, has a johnson, and taking flomax. Follow up with Dr. Escobedo (cardiology), Dr. Mejía (thoracic surgery), and urologist was provided.

## 2018-03-30 NOTE — DISCHARGE NOTE ADULT - PROVIDER TOKENS
KENNEY:'[id number]',KENNEY:'[id number]' TOKEN:'[id number]',TOKEN:'[id number]',FREE:[LAST:[cardiology],PHONE:[(   )    -],FAX:[(   )    -]],FREE:[LAST:[Urology],PHONE:[(   )    -],FAX:[(   )    -]]

## 2018-03-30 NOTE — DISCHARGE NOTE ADULT - MEDICATION SUMMARY - MEDICATIONS TO TAKE
I will START or STAY ON the medications listed below when I get home from the hospital:    Bisocard  -- 10 mg po - from [facility]   -- Indication: For Home med    aspirin 81 mg oral tablet  -- 1 tab(s) by mouth once a day - to start 3/21/18  -- Indication: For Vessel protection    ramipril 10 mg oral capsule  -- 1 cap(s) by mouth once a day  -- Indication: For Blood pressure    isosorbide mononitrate 30 mg oral tablet, extended release  -- 1 tab(s) by mouth once a day (in the morning)  -- Indication: For Antianginal    atorvastatin 20 mg oral tablet  -- 1 tab(s) by mouth once a day  -- Indication: For Cholesterol    Plavix 75 mg oral tablet  -- 1 tab(s) by mouth once a day  -- Indication: For Vessel protection    Atrovent 18 mcg/inh inhalation aerosol  -- from [facility] - 1-2 x/day  -- Indication: For Bronchodilator I will START or STAY ON the medications listed below when I get home from the hospital:    Bisocard  -- 10 mg po - from [facility]   -- Indication: For Home med    aspirin 81 mg oral tablet  -- 1 tab(s) by mouth once a day - to start 3/21/18  -- Indication: For Vessel protection    oxyCODONE 5 mg oral tablet  -- 1 to 2 tab(s) by mouth every 4 to 6 hours, As needed, Moderate Pain to severe pain MDD:6  -- Indication: For Pain    ramipril 10 mg oral capsule  -- 1 cap(s) by mouth once a day  -- Indication: For Blood pressure    tamsulosin 0.4 mg oral capsule  -- 1 cap(s) by mouth once a day (at bedtime)  -- Indication: For Urinary retention    isosorbide mononitrate 30 mg oral tablet, extended release  -- 1 tab(s) by mouth once a day (in the morning)  -- Indication: For Antianginal    atorvastatin 20 mg oral tablet  -- 1 tab(s) by mouth once a day  -- Indication: For Cholesterol    Plavix 75 mg oral tablet  -- 1 tab(s) by mouth once a day  -- Indication: For Vessel protection    metoprolol tartrate 37.5 mg oral tablet  -- 1 tab(s) by mouth every 8 hours  -- Indication: For Heart rate control    Atrovent 18 mcg/inh inhalation aerosol  -- from [place] - 1-2 x/day  -- Indication: For Bronchodilator    docusate sodium 100 mg oral capsule  -- 1 cap(s) by mouth 3 times a day, As Needed constipation   -- Indication: For Constipation    senna oral tablet  -- 2 tab(s) by mouth once a day (at bedtime), As Needed for constipation   -- Indication: For Constipation

## 2018-03-31 LAB
BUN SERPL-MCNC: 19 MG/DL — SIGNIFICANT CHANGE UP (ref 7–23)
CALCIUM SERPL-MCNC: 8.5 MG/DL — SIGNIFICANT CHANGE UP (ref 8.4–10.5)
CHLORIDE SERPL-SCNC: 101 MMOL/L — SIGNIFICANT CHANGE UP (ref 98–107)
CO2 SERPL-SCNC: 28 MMOL/L — SIGNIFICANT CHANGE UP (ref 22–31)
CREAT SERPL-MCNC: 0.84 MG/DL — SIGNIFICANT CHANGE UP (ref 0.5–1.3)
GLUCOSE SERPL-MCNC: 166 MG/DL — HIGH (ref 70–99)
HCT VFR BLD CALC: 41.6 % — SIGNIFICANT CHANGE UP (ref 39–50)
HGB BLD-MCNC: 13.8 G/DL — SIGNIFICANT CHANGE UP (ref 13–17)
MCHC RBC-ENTMCNC: 31.6 PG — SIGNIFICANT CHANGE UP (ref 27–34)
MCHC RBC-ENTMCNC: 33.2 % — SIGNIFICANT CHANGE UP (ref 32–36)
MCV RBC AUTO: 95.2 FL — SIGNIFICANT CHANGE UP (ref 80–100)
NRBC # FLD: 0 — SIGNIFICANT CHANGE UP
PLATELET # BLD AUTO: 132 K/UL — LOW (ref 150–400)
PMV BLD: 12.6 FL — SIGNIFICANT CHANGE UP (ref 7–13)
POTASSIUM SERPL-MCNC: 4.2 MMOL/L — SIGNIFICANT CHANGE UP (ref 3.5–5.3)
POTASSIUM SERPL-SCNC: 4.2 MMOL/L — SIGNIFICANT CHANGE UP (ref 3.5–5.3)
RBC # BLD: 4.37 M/UL — SIGNIFICANT CHANGE UP (ref 4.2–5.8)
RBC # FLD: 14.5 % — SIGNIFICANT CHANGE UP (ref 10.3–14.5)
SODIUM SERPL-SCNC: 138 MMOL/L — SIGNIFICANT CHANGE UP (ref 135–145)
WBC # BLD: 8.92 K/UL — SIGNIFICANT CHANGE UP (ref 3.8–10.5)
WBC # FLD AUTO: 8.92 K/UL — SIGNIFICANT CHANGE UP (ref 3.8–10.5)

## 2018-03-31 PROCEDURE — 71045 X-RAY EXAM CHEST 1 VIEW: CPT | Mod: 26

## 2018-03-31 PROCEDURE — 99223 1ST HOSP IP/OBS HIGH 75: CPT

## 2018-03-31 RX ORDER — ACETAMINOPHEN 500 MG
650 TABLET ORAL EVERY 6 HOURS
Qty: 0 | Refills: 0 | Status: DISCONTINUED | OUTPATIENT
Start: 2018-03-31 | End: 2018-04-01

## 2018-03-31 RX ORDER — METOPROLOL TARTRATE 50 MG
37.5 TABLET ORAL EVERY 8 HOURS
Qty: 0 | Refills: 0 | Status: DISCONTINUED | OUTPATIENT
Start: 2018-03-31 | End: 2018-04-01

## 2018-03-31 RX ORDER — MULTIVIT WITH MIN/MFOLATE/K2 340-15/3 G
150 POWDER (GRAM) ORAL ONCE
Qty: 0 | Refills: 0 | Status: COMPLETED | OUTPATIENT
Start: 2018-03-31 | End: 2018-03-31

## 2018-03-31 RX ADMIN — ATORVASTATIN CALCIUM 20 MILLIGRAM(S): 80 TABLET, FILM COATED ORAL at 22:42

## 2018-03-31 RX ADMIN — Medication 150 MILLILITER(S): at 21:12

## 2018-03-31 RX ADMIN — ISOSORBIDE MONONITRATE 30 MILLIGRAM(S): 60 TABLET, EXTENDED RELEASE ORAL at 13:20

## 2018-03-31 RX ADMIN — Medication 37.5 MILLIGRAM(S): at 22:43

## 2018-03-31 RX ADMIN — Medication 100 MILLIGRAM(S): at 05:35

## 2018-03-31 RX ADMIN — Medication 25 MILLIGRAM(S): at 05:35

## 2018-03-31 RX ADMIN — Medication 100 MILLIGRAM(S): at 13:20

## 2018-03-31 RX ADMIN — HEPARIN SODIUM 5000 UNIT(S): 5000 INJECTION INTRAVENOUS; SUBCUTANEOUS at 22:43

## 2018-03-31 RX ADMIN — HEPARIN SODIUM 5000 UNIT(S): 5000 INJECTION INTRAVENOUS; SUBCUTANEOUS at 05:35

## 2018-03-31 RX ADMIN — Medication 81 MILLIGRAM(S): at 13:20

## 2018-03-31 RX ADMIN — SENNA PLUS 2 TABLET(S): 8.6 TABLET ORAL at 22:42

## 2018-03-31 RX ADMIN — TAMSULOSIN HYDROCHLORIDE 0.4 MILLIGRAM(S): 0.4 CAPSULE ORAL at 16:30

## 2018-03-31 RX ADMIN — HEPARIN SODIUM 5000 UNIT(S): 5000 INJECTION INTRAVENOUS; SUBCUTANEOUS at 13:20

## 2018-03-31 RX ADMIN — Medication 1 ENEMA: at 19:34

## 2018-03-31 RX ADMIN — PANTOPRAZOLE SODIUM 40 MILLIGRAM(S): 20 TABLET, DELAYED RELEASE ORAL at 05:34

## 2018-03-31 RX ADMIN — Medication 37.5 MILLIGRAM(S): at 13:20

## 2018-03-31 RX ADMIN — CLOPIDOGREL BISULFATE 75 MILLIGRAM(S): 75 TABLET, FILM COATED ORAL at 13:20

## 2018-03-31 RX ADMIN — Medication 100 MILLIGRAM(S): at 22:42

## 2018-03-31 NOTE — CONSULT NOTE ADULT - ASSESSMENT
Patient is a 67 yr old man who underwent a right VATS, Robotic-Assisted Right Middle Lobe Wedge Resection and a Right Upper Lobectomy with Dr Mejía on 3/28/18.   Pt had c/o of wheezing and a CXR, CT Scan, and PET scan confirmed a right lung lesion.  Post-op, he had an air leak and increased chest tube outputs.  When both these issues resolved, the chest tube was removed for discharge home.    Of note, he has a history of stable asymptomatic CAD s/p PCI last May 2017.  He has restarted taking DAPT without bleeding issues.    Noted to be in rapid AF up to 120s bpm, self-converted earlier this morning to SR.  Currently in SR on telemetry.  Patient remained asymptomatic.    Currently on aspirin and clopidogrel.    Recommendations: Continue with DAPT as tolerated.  Increase metoprolol dose as BP/HR allows.    Continue discharge planning as per primary team.

## 2018-03-31 NOTE — CONSULT NOTE ADULT - SUBJECTIVE AND OBJECTIVE BOX
Cardiology/Vascular Medicine Inpatient Consultation Note        HISTORY OF PRESENT ILLNESS:  Pt is a 67 yr old male scheduled for right Video Assisted Thoracoscopy Robotic Assisted Right Upper Lobe Wedge Resection Possible Right Upper Lobectomy [facility] Dr Mejía 3/26/18 - pt speaks Polish and translation done by daughter who states that surgery has been changed to 3/28/18. Pt hx of CAD stent placed 5/17 and AAA that is under observation. Pt c/o of wheezing and CXR done 3 weeks ago wt CT Scan and PET scan. Pt hx of HTN and smoking (21 Mar 2018 10:28)    Allergies  No Known Allergies    MEDICATIONS:  aspirin enteric coated 81 milliGRAM(s) Oral daily  clopidogrel Tablet 75 milliGRAM(s) Oral daily  heparin  Injectable 5000 Unit(s) SubCutaneous every 8 hours  isosorbide   mononitrate ER Tablet (IMDUR) 30 milliGRAM(s) Oral daily  metoprolol tartrate 37.5 milliGRAM(s) Oral every 8 hours  tamsulosin 0.4 milliGRAM(s) Oral at bedtime    acetaminophen   Tablet. 650 milliGRAM(s) Oral every 6 hours PRN  oxyCODONE    IR 5 milliGRAM(s) Oral every 4 hours PRN  oxyCODONE    IR 10 milliGRAM(s) Oral every 4 hours PRN    docusate sodium 100 milliGRAM(s) Oral three times a day  pantoprazole    Tablet 40 milliGRAM(s) Oral before breakfast  senna 2 Tablet(s) Oral at bedtime    atorvastatin 20 milliGRAM(s) Oral at bedtime    influenza   Vaccine 0.5 milliLiter(s) IntraMuscular once      PAST MEDICAL & SURGICAL HISTORY:  Smoking  Lung nodule  AAA (abdominal aortic aneurysm)  MI (myocardial infarction)  Coronary insufficiency  HLD (hyperlipidemia)  HTN (hypertension)  History of ear surgery  S/P primary angioplasty with coronary stent: 5/17 - [employer] RX Lot [id number]      FAMILY HISTORY:  Family history of coronary artery disease (Sibling)      SOCIAL HISTORY:    As above    REVIEW OF SYSTEMS:  CONSTITUTIONAL: No fever, weight loss, or fatigue  EYES: No eye pain, visual disturbances, or discharge  ENMT:  No difficulty hearing, tinnitus, vertigo; No sinus or throat pain  NECK: No pain or stiffness  RESPIRATORY: No cough, wheezing, chills or hemoptysis; No Shortness of Breath  CARDIOVASCULAR: No chest pain, palpitations, passing out, dizziness, or leg swelling  GASTROINTESTINAL: No abdominal or epigastric pain. No nausea, vomiting, or hematemesis; No diarrhea or constipation. No melena or hematochezia.  GENITOURINARY: No dysuria, frequency, hematuria, or incontinence  NEUROLOGICAL: No headaches, memory loss, loss of strength, numbness, or tremors  SKIN: No itching, burning, rashes, or lesions   LYMPH Nodes: No enlarged glands  ENDOCRINE: No heat or cold intolerance; No hair loss  MUSCULOSKELETAL: No joint pain or swelling; No muscle, back, or extremity pain  PSYCHIATRIC: No depression, anxiety, mood swings, or difficulty sleeping  HEME/LYMPH: No easy bruising, or bleeding gums  ALLERY AND IMMUNOLOGIC: No hives or eczema	      PHYSICAL EXAM:  T(C): 36.9 (03-31-18 @ 13:15), Max: 37.2 (03-31-18 @ 08:23)  HR: 70 (03-31-18 @ 13:15) (70 - 140)  BP: 117/63 (03-31-18 @ 13:15) (117/63 - 146/63)  RR: 18 (03-31-18 @ 13:15) (18 - 19)  SpO2: 96% (03-31-18 @ 13:15) (95% - 98%)  Wt(kg): --  I&O's Summary    30 Mar 2018 07:01  -  31 Mar 2018 07:00  --------------------------------------------------------  IN: 1070 mL / OUT: 2550 mL / NET: -1480 mL    31 Mar 2018 07:01  -  31 Mar 2018 15:22  --------------------------------------------------------  IN: 960 mL / OUT: 350 mL / NET: 610 mL        Appearance: Normal	  HEENT:   Normal oral mucosa, PERRL, EOMI	  Lymphatic: No lymphadenopathy  Cardiovascular: Normal S1 S2, No JVD, No murmurs, No edema  Respiratory: Lungs clear to auscultation	  Psychiatry: A & O x 3, Mood & affect appropriate  Gastrointestinal:  Soft, Non-tender, + BS	  Skin: No rashes, No ecchymoses, No cyanosis	  Neurologic: Non-focal  Extremities: Normal range of motion, No clubbing, cyanosis or edema  Vascular: Peripheral pulses palpable 2+ bilaterally      LABS:	 	                          13.8   8.92  )-----------( 132      ( 31 Mar 2018 05:48 )             41.6     03-31    138  |  101  |  19  ----------------------------<  166<H>  4.2   |  28  |  0.84  03-30    140  |  102  |  21  ----------------------------<  155<H>  4.4   |  28  |  0.86    Ca    8.5      31 Mar 2018 05:48  Ca    8.7      30 Mar 2018 06:51    	  	< from: Transthoracic Echocardiogram (03.23.18 @ 13:05) >  Patient name: OLSZEWSKI, WALDEMAR  YOB: 1950   Age: 67 (M)   MR#: [id number]  Study Date: 3/23/2018  Location: O/PSonographer: Meek Canseco [facility]  Study quality: Technically good  Referring Physician: Danny Escobedo MD / Williams Mejía MD  Blood Pressure: 133/71 mmHg  Height: 178 cm  Weight: 97 kg  BSA: 2.2 m2  ------------------------------------------------------------------------  PROCEDURE: Transthoracic echocardiogram with 2-D, M-Mode  and complete spectral and color flow Doppler.  INDICATION: Atherosclerotic heart disease of native  coronary artery without angina pectoris (I25.10)  ------------------------------------------------------------------------  DIMENSIONS:  Dimensions:     Normal Values:  LA:     3.5 cm    2.0 -4.0 cm  Ao:     2.8 cm    2.0 - 3.8 cm  SEPTUM: 1.0 cm    0.6 - 1.2 cm  PWT:    1.0 cm    0.6 - 1.1 cm  LVIDd:  6.0 cm    3.0 - 5.6 cm  LVIDs:  4.3 cm    1.8 - 4.0 cm  Derived Variables:  LVMI: 115 g/m2  RWT: 0.33  Fractional short: 28 %  Ejection Fraction (Visual Estimate): 50-55 %  ------------------------------------------------------------------------  OBSERVATIONS:  Mitral Valve: Mitral annular calcification, otherwise  normal mitral valve. Mild-moderate mitral regurgitation.  Aortic Root: Normal size aortic root. (Ao:2.8 cm).  Aortic Valve: Calcified trileaflet aortic valve with normal  opening. No aortic valve regurgitation seen.  Left Atrium: Normal left atrium.  LA volume index = 31  cc/m2.  Left Ventricle: Endocardium not well visualized; grossly  borderline-normal left ventricular systolic function.  Estimated LVEF 50-55%. Eccentric left ventricular  hypertrophy (dilated left ventricle with normal relative  wall thickness). Moderate diastolic dysfunction (Stage II).  Right Heart: Normal right atrium. Normal right ventricular  size and function. Normal tricuspid valve. Minimal  tricuspid regurgitation. Normal pulmonic valve.  Pericardium/PleuraNormal pericardium with no pericardial  effusion.  Hemodynamic: Estimated right ventricular systolic pressure  equals 44 mm Hg, assuming right atrial pressure equals 10  mm Hg, consistent with mild pulmonary hypertension.  ------------------------------------------------------------------------  CONCLUSIONS:  1. Mitral annular calcification, otherwise normal mitral  valve. Mild-moderate mitral regurgitation.  2. Eccentric left ventricular hypertrophy (dilated left  ventricle with normal relative wall thickness).  3. Endocardium not well visualized; grossly  borderline-normal left ventricular systolic function.  Estimated LVEF 50-55%.  4. Moderate diastolic dysfunction (Stage II).  5. Normal right ventricular size and function.  6. Normal tricuspid valve. Minimal tricuspid regurgitation.  7. Estimated pulmonary artery systolic pressure equals 44  mm Hg, assuming right atrial pressure equals 10  mm Hg,  consistent with mild pulmonary hypertension.  *** No previous Echo exam.  ------------------------------------------------------------------------  Confirmed on  3/23/2018- 14:26:32 by Danny Escobedo MD, [facility],  CESAR, VI  ------------------------------------------------------------------------    < end of copied text > Cardiology/Vascular Medicine Inpatient Consultation Note        HISTORY OF PRESENT ILLNESS:    Patient is a 67 yr old man who underwent a right VATS, Robotic-Assisted Right Middle Lobe Wedge Resection and a Right Upper Lobectomy with Dr Mejía on 3/28/18.   Pt had c/o of wheezing and a CXR, CT Scan, and PET scan confirmed a right lung lesion.  Post-op, he had an air leak and increased chest tube outputs.  When both these issues resolved, the chest tube was removed for discharge home.    Of note, he has a history of stable asymptomatic CAD s/p PCI last May 2017.  He has restarted taking DAPT without bleeding issues.    Noted to be in rapid AF up to 120s bpm, self-converted earlier this morning to SR.  Currently in SR on telemetry.  Patient remained asymptomatic.    Currently on aspirin and clopidogrel.    Recommendations: Continue with DAPT as tolerated.  Increase metoprolol dose as BP/HR allows.    Allergies  No Known Allergies    MEDICATIONS:  aspirin enteric coated 81 milliGRAM(s) Oral daily  clopidogrel Tablet 75 milliGRAM(s) Oral daily  heparin  Injectable 5000 Unit(s) SubCutaneous every 8 hours  isosorbide   mononitrate ER Tablet (IMDUR) 30 milliGRAM(s) Oral daily  metoprolol tartrate 37.5 milliGRAM(s) Oral every 8 hours  tamsulosin 0.4 milliGRAM(s) Oral at bedtime    acetaminophen   Tablet. 650 milliGRAM(s) Oral every 6 hours PRN  oxyCODONE    IR 5 milliGRAM(s) Oral every 4 hours PRN  oxyCODONE    IR 10 milliGRAM(s) Oral every 4 hours PRN    docusate sodium 100 milliGRAM(s) Oral three times a day  pantoprazole    Tablet 40 milliGRAM(s) Oral before breakfast  senna 2 Tablet(s) Oral at bedtime    atorvastatin 20 milliGRAM(s) Oral at bedtime    influenza   Vaccine 0.5 milliLiter(s) IntraMuscular once      PAST MEDICAL & SURGICAL HISTORY:  Smoking  Lung nodule  AAA (abdominal aortic aneurysm)  MI (myocardial infarction)  Coronary insufficiency  HLD (hyperlipidemia)  HTN (hypertension)  History of ear surgery  S/P primary angioplasty with coronary stent: 5/17 - [employer] RX Lot [id number]      FAMILY HISTORY:  Family history of coronary artery disease (Sibling)      SOCIAL HISTORY:    As above    REVIEW OF SYSTEMS:  CONSTITUTIONAL: No fever, weight loss, or fatigue  EYES: No eye pain, visual disturbances, or discharge  ENMT:  No difficulty hearing, tinnitus, vertigo; No sinus or throat pain  NECK: No pain or stiffness  RESPIRATORY: As above  CARDIOVASCULAR: As above  GASTROINTESTINAL: No abdominal or epigastric pain. No nausea, vomiting, or hematemesis; No diarrhea or constipation. No melena or hematochezia.  GENITOURINARY: No dysuria, frequency, hematuria, or incontinence  NEUROLOGICAL: No headaches, memory loss, loss of strength, numbness, or tremors  SKIN: No itching, burning, rashes, or lesions   LYMPH Nodes: No enlarged glands  ENDOCRINE: No heat or cold intolerance; No hair loss  MUSCULOSKELETAL: No joint pain or swelling; No muscle, back, or extremity pain  PSYCHIATRIC: No depression, anxiety, mood swings, or difficulty sleeping  HEME/LYMPH: No easy bruising, or bleeding gums  ALLERGY AND IMMUNOLOGIC: No hives or eczema	      PHYSICAL EXAM:  T(C): 36.9 (03-31-18 @ 13:15), Max: 37.2 (03-31-18 @ 08:23)  HR: 70 (03-31-18 @ 13:15) (70 - 140)  BP: 117/63 (03-31-18 @ 13:15) (117/63 - 146/63)  RR: 18 (03-31-18 @ 13:15) (18 - 19)  SpO2: 96% (03-31-18 @ 13:15) (95% - 98%)  Wt(kg): --  I&O's Summary    30 Mar 2018 07:01  -  31 Mar 2018 07:00  --------------------------------------------------------  IN: 1070 mL / OUT: 2550 mL / NET: -1480 mL    31 Mar 2018 07:01  -  31 Mar 2018 15:22  --------------------------------------------------------  IN: 960 mL / OUT: 350 mL / NET: 610 mL        Appearance: NAD, appears comfortable	  HEENT:   Normal oral mucosa, PERRL, EOMI	  Lymphatic: No lymphadenopathy  Cardiovascular: Normal S1 S2, No JVD, No murmurs, No edema  Respiratory: Decreased BS b/l  Psychiatry: Awake, alert  Gastrointestinal:  Soft, Non-tender, + BS	  Skin: No rashes, No ecchymoses, No cyanosis	  Neurologic: Non-focal  Extremities: Normal range of motion, No clubbing, cyanosis or edema  Vascular: Peripheral pulses palpable 2+ bilaterally      LABS:	 	                          13.8   8.92  )-----------( 132      ( 31 Mar 2018 05:48 )             41.6     03-31    138  |  101  |  19  ----------------------------<  166<H>  4.2   |  28  |  0.84  03-30    140  |  102  |  21  ----------------------------<  155<H>  4.4   |  28  |  0.86    Ca    8.5      31 Mar 2018 05:48  Ca    8.7      30 Mar 2018 06:51    	  	< from: Transthoracic Echocardiogram (03.23.18 @ 13:05) >  Patient name: OLSZEWSKI, WALDEMAR  YOB: 1950   Age: 67 (M)   MR#: [id number]  Study Date: 3/23/2018  Location: O/PSonographer: Meek Canseco [facility]  Study quality: Technically good  Referring Physician: Danny Escobedo MD / Williams Mejía MD  Blood Pressure: 133/71 mmHg  Height: 178 cm  Weight: 97 kg  BSA: 2.2 m2  ------------------------------------------------------------------------  PROCEDURE: Transthoracic echocardiogram with 2-D, M-Mode  and complete spectral and color flow Doppler.  INDICATION: Atherosclerotic heart disease of native  coronary artery without angina pectoris (I25.10)  ------------------------------------------------------------------------  DIMENSIONS:  Dimensions:     Normal Values:  LA:     3.5 cm    2.0 -4.0 cm  Ao:     2.8 cm    2.0 - 3.8 cm  SEPTUM: 1.0 cm    0.6 - 1.2 cm  PWT:    1.0 cm    0.6 - 1.1 cm  LVIDd:  6.0 cm    3.0 - 5.6 cm  LVIDs:  4.3 cm    1.8 - 4.0 cm  Derived Variables:  LVMI: 115 g/m2  RWT: 0.33  Fractional short: 28 %  Ejection Fraction (Visual Estimate): 50-55 %  ------------------------------------------------------------------------  OBSERVATIONS:  Mitral Valve: Mitral annular calcification, otherwise  normal mitral valve. Mild-moderate mitral regurgitation.  Aortic Root: Normal size aortic root. (Ao:2.8 cm).  Aortic Valve: Calcified trileaflet aortic valve with normal  opening. No aortic valve regurgitation seen.  Left Atrium: Normal left atrium.  LA volume index = 31  cc/m2.  Left Ventricle: Endocardium not well visualized; grossly  borderline-normal left ventricular systolic function.  Estimated LVEF 50-55%. Eccentric left ventricular  hypertrophy (dilated left ventricle with normal relative  wall thickness). Moderate diastolic dysfunction (Stage II).  Right Heart: Normal right atrium. Normal right ventricular  size and function. Normal tricuspid valve. Minimal  tricuspid regurgitation. Normal pulmonic valve.  Pericardium/PleuraNormal pericardium with no pericardial  effusion.  Hemodynamic: Estimated right ventricular systolic pressure  equals 44 mm Hg, assuming right atrial pressure equals 10  mm Hg, consistent with mild pulmonary hypertension.  ------------------------------------------------------------------------  CONCLUSIONS:  1. Mitral annular calcification, otherwise normal mitral  valve. Mild-moderate mitral regurgitation.  2. Eccentric left ventricular hypertrophy (dilated left  ventricle with normal relative wall thickness).  3. Endocardium not well visualized; grossly  borderline-normal left ventricular systolic function.  Estimated LVEF 50-55%.  4. Moderate diastolic dysfunction (Stage II).  5. Normal right ventricular size and function.  6. Normal tricuspid valve. Minimal tricuspid regurgitation.  7. Estimated pulmonary artery systolic pressure equals 44  mm Hg, assuming right atrial pressure equals 10  mm Hg,  consistent with mild pulmonary hypertension.  *** No previous Echo exam.  ------------------------------------------------------------------------  Confirmed on  3/23/2018- 14:26:32 by Danny Escobedo MD, [facility],  CESAR, VI  ------------------------------------------------------------------------    < end of copied text >

## 2018-03-31 NOTE — PROGRESS NOTE ADULT - SUBJECTIVE AND OBJECTIVE BOX
66 yo male w/ PMHx infrarenal AAA, HTN, HLD, [place], and CAD s/p stents (2017) who is currently POD# 3 from RVATS, RULobectomy and RML wedge. Post-op course was complicated by urinary rentention requiring johnson insertion as well as new onset a-fib limited rate control w/ frequent bursts to 140s. Pt denies chest pain, palpitations, dizziness, SOB, fever, chills, nausea and vomiting.     3/31 No acute events overnight. Johnson inserted 3/30 @1600    PMHx/PSHx:PAST MEDICAL & SURGICAL HISTORY:  Smoking  Lung nodule  AAA (abdominal aortic aneurysm)  MI (myocardial infarction)  Coronary insufficiency  HLD (hyperlipidemia)  HTN (hypertension)  History of ear surgery  S/P primary angioplasty with coronary stent: 5/17 - [employer] RX Lot [id number]      Vital Signs:  Vital Signs Last 24 Hrs  T(C): 37.2 (03-31-18 @ 08:23), Max: 37.2 (03-31-18 @ 08:23)  T(F): 99 (03-31-18 @ 08:23), Max: 99 (03-31-18 @ 08:23)  HR: 81 (03-31-18 @ 08:23) (74 - 140)  BP: 132/76 (03-31-18 @ 08:23) (122/64 - 146/63)  RR: 18 (03-31-18 @ 08:23) (18 - 20)  SpO2: 96% (03-31-18 @ 08:23) (95% - 98%) on (O2)    Telemetry/Alarms: Afib rate 88   Gen: Aox3 no acute distress, speaks polish only. Discussed w/ pt daughter   Neuro: Grossly intact ambulating throughout the room   Head/Neck: Normocephalic, trachea is midline   Pulm: Lungs clear to ausculation   Card: Irr/Irr, s1/s2 no murmur  Abd: Soft, non-tender   LE: No edema, non-tender   Skin: R CT site w/ tegaderm and intact dressing     CXR **Pending official Review**:  No PTX     Relevant labs, radiology and Medications reviewed                        13.8   8.92  )-----------( 132      ( 31 Mar 2018 05:48 )             41.6     03-31    138  |  101  |  19  ----------------------------<  166<H>  4.2   |  28  |  0.84    Ca    8.5      31 Mar 2018 05:48        Pertinent Physical Exam  I&O's Summary    30 Mar 2018 07:01  -  31 Mar 2018 07:00  --------------------------------------------------------  IN: 1070 mL / OUT: 2550 mL / NET: -1480 mL        Assessment  67y Male  w/ PAST MEDICAL & SURGICAL HISTORY:  Smoking  Lung nodule  AAA (abdominal aortic aneurysm)  MI (myocardial infarction)  Coronary insufficiency  HLD (hyperlipidemia)  HTN (hypertension)  History of ear surgery  S/P primary angioplasty with coronary stent: 5/17 - [employer] RX Lot [id number]   Patient is a 67y old  Male who presents with a chief complaint of right lung nodule (30 Mar 2018 01:52)  .  On (Date), patient underwent Robotic assistance in thoracoscopic procedure  .     Postoperative course/issues:                                                                                                        PLAN    **Neuro**   - oxy/tylenol for pain   **Pulm**  - OOB to chair, frequent ambulation, use of incentive spirometer   - Nebulizer  - CXR this am stable, no need for further imaging   **Cardio**   - New stents resumed plavix/ASA/lipitor   - Restarted Imdur  - New onset A-fib (3/30 @ 1600) <48hrs/chadsvasc = 3 w/ limited rate control  - Increased BB to 37.5TID   - Cont tele monitoring   - Will call Dr. Escobedo (pt. cardiologist) for possible further AC & tighter rate control   **GI**  - DASH diet  - senna/colace/protonix  **Renal**   - electrolytes stable  - good urine output   ****  - Failed TOV, johnson inserted and started on flomax  - keep johnson in for 48hours than attempt TOV   **Vasc**  - ASA/plavix for stents  - SQH for additional DVT prophylaxis  **Heme**  - h/h stable   ** Endo**  - Stable   **ID**  - afebrile, no Abx     Discussed with Cardiothoracic Team at AM rounds.                                                                                                      Contact spectra: [id number]

## 2018-04-01 ENCOUNTER — OUTPATIENT (OUTPATIENT)
Dept: OUTPATIENT SERVICES | Facility: HOSPITAL | Age: 68
LOS: 1 days | End: 2018-04-01
Payer: MEDICAID

## 2018-04-01 VITALS
RESPIRATION RATE: 18 BRPM | SYSTOLIC BLOOD PRESSURE: 131 MMHG | HEART RATE: 88 BPM | TEMPERATURE: 98 F | DIASTOLIC BLOOD PRESSURE: 74 MMHG | OXYGEN SATURATION: 95 %

## 2018-04-01 DIAGNOSIS — Z98.890 OTHER SPECIFIED POSTPROCEDURAL STATES: Chronic | ICD-10-CM

## 2018-04-01 DIAGNOSIS — Z95.5 PRESENCE OF CORONARY ANGIOPLASTY IMPLANT AND GRAFT: Chronic | ICD-10-CM

## 2018-04-01 LAB
BACTERIA UR CULT: SIGNIFICANT CHANGE UP
BUN SERPL-MCNC: 18 MG/DL — SIGNIFICANT CHANGE UP (ref 7–23)
CALCIUM SERPL-MCNC: 9.3 MG/DL — SIGNIFICANT CHANGE UP (ref 8.4–10.5)
CHLORIDE SERPL-SCNC: 102 MMOL/L — SIGNIFICANT CHANGE UP (ref 98–107)
CO2 SERPL-SCNC: 27 MMOL/L — SIGNIFICANT CHANGE UP (ref 22–31)
CREAT SERPL-MCNC: 0.84 MG/DL — SIGNIFICANT CHANGE UP (ref 0.5–1.3)
GLUCOSE SERPL-MCNC: 158 MG/DL — HIGH (ref 70–99)
MAGNESIUM SERPL-MCNC: 2 MG/DL — SIGNIFICANT CHANGE UP (ref 1.6–2.6)
POTASSIUM SERPL-MCNC: 4.3 MMOL/L — SIGNIFICANT CHANGE UP (ref 3.5–5.3)
POTASSIUM SERPL-SCNC: 4.3 MMOL/L — SIGNIFICANT CHANGE UP (ref 3.5–5.3)
SODIUM SERPL-SCNC: 141 MMOL/L — SIGNIFICANT CHANGE UP (ref 135–145)
SPECIMEN SOURCE: SIGNIFICANT CHANGE UP

## 2018-04-01 PROCEDURE — 71045 X-RAY EXAM CHEST 1 VIEW: CPT | Mod: 26

## 2018-04-01 PROCEDURE — 99232 SBSQ HOSP IP/OBS MODERATE 35: CPT

## 2018-04-01 PROCEDURE — G9001: CPT

## 2018-04-01 RX ORDER — DOCUSATE SODIUM 100 MG
1 CAPSULE ORAL
Qty: 30 | Refills: 0 | OUTPATIENT
Start: 2018-04-01 | End: 2018-04-10

## 2018-04-01 RX ORDER — TAMSULOSIN HYDROCHLORIDE 0.4 MG/1
1 CAPSULE ORAL
Qty: 14 | Refills: 0 | OUTPATIENT
Start: 2018-04-01 | End: 2018-04-14

## 2018-04-01 RX ORDER — SENNA PLUS 8.6 MG/1
2 TABLET ORAL
Qty: 20 | Refills: 0 | OUTPATIENT
Start: 2018-04-01 | End: 2018-04-10

## 2018-04-01 RX ORDER — OXYCODONE HYDROCHLORIDE 5 MG/1
1 TABLET ORAL
Qty: 30 | Refills: 0 | OUTPATIENT
Start: 2018-04-01 | End: 2018-04-05

## 2018-04-01 RX ORDER — METOPROLOL TARTRATE 50 MG
1 TABLET ORAL
Qty: 42 | Refills: 0 | OUTPATIENT
Start: 2018-04-01 | End: 2018-04-14

## 2018-04-01 RX ADMIN — Medication 81 MILLIGRAM(S): at 12:42

## 2018-04-01 RX ADMIN — CLOPIDOGREL BISULFATE 75 MILLIGRAM(S): 75 TABLET, FILM COATED ORAL at 12:42

## 2018-04-01 RX ADMIN — Medication 100 MILLIGRAM(S): at 05:19

## 2018-04-01 RX ADMIN — PANTOPRAZOLE SODIUM 40 MILLIGRAM(S): 20 TABLET, DELAYED RELEASE ORAL at 05:19

## 2018-04-01 RX ADMIN — ISOSORBIDE MONONITRATE 30 MILLIGRAM(S): 60 TABLET, EXTENDED RELEASE ORAL at 12:42

## 2018-04-01 RX ADMIN — HEPARIN SODIUM 5000 UNIT(S): 5000 INJECTION INTRAVENOUS; SUBCUTANEOUS at 05:19

## 2018-04-01 NOTE — PROGRESS NOTE ADULT - ASSESSMENT
Patient is a 67 yr old man who underwent a right VATS, Robotic-Assisted Right Middle Lobe Wedge Resection and a Right Upper Lobectomy with Dr Mejía on 3/28/18.   Pt had c/o of wheezing and a CXR, CT Scan, and PET scan confirmed a right lung lesion.  Post-op, he had an air leak and increased chest tube outputs.  When both these issues resolved, the chest tube was removed for discharge home.    Of note, he has a history of stable asymptomatic CAD s/p PCI last May 2017.  He has restarted taking DAPT without bleeding issues.    Patient has remained in sinus rhythm since yesterday AM, when he converted on his own.    Continue on aspirin and clopidogrel.    Increase metoprolol dose as BP/HR allows.    Continue discharge planning as per primary team.    F/U in office in 2 weeks for routine evaluation, EKG.

## 2018-04-01 NOTE — PROGRESS NOTE ADULT - SUBJECTIVE AND OBJECTIVE BOX
Cardiology/Vascular Medicine Inpatient Progress Note    No current complaints  Pain controlled  Telemetry: SR, no atrial fibrillation since yesterday morning    Vital Signs Last 24 Hrs  T(C): 36.9 (01 Apr 2018 09:24), Max: 37.2 (31 Mar 2018 16:45)  T(F): 98.5 (01 Apr 2018 09:24), Max: 98.9 (31 Mar 2018 16:45)  HR: 88 (01 Apr 2018 09:24) (56 - 88)  BP: 131/74 (01 Apr 2018 09:24) (117/63 - 141/80)  BP(mean): --  RR: 18 (01 Apr 2018 09:24) (17 - 18)  SpO2: 95% (01 Apr 2018 09:24) (95% - 99%)      Appearance: NAD, appears comfortable	  HEENT:   Normal oral mucosa, PERRL, EOMI	  Lymphatic: No lymphadenopathy  Cardiovascular: Normal S1 S2, No JVD, No murmurs, No edema  Respiratory: Decreased BS b/l  Psychiatry: Awake, alert  Gastrointestinal:  Soft, Non-tender, + BS	  Skin: No rashes, No ecchymoses, No cyanosis	  Neurologic: Non-focal  Extremities: Normal range of motion, No clubbing, cyanosis or edema  Vascular: Peripheral pulses palpable 2+ bilaterally    MEDICATIONS  (STANDING):  aspirin enteric coated 81 milliGRAM(s) Oral daily  atorvastatin 20 milliGRAM(s) Oral at bedtime  clopidogrel Tablet 75 milliGRAM(s) Oral daily  docusate sodium 100 milliGRAM(s) Oral three times a day  heparin  Injectable 5000 Unit(s) SubCutaneous every 8 hours  influenza   Vaccine 0.5 milliLiter(s) IntraMuscular once  isosorbide   mononitrate ER Tablet (IMDUR) 30 milliGRAM(s) Oral daily  metoprolol tartrate 37.5 milliGRAM(s) Oral every 8 hours  pantoprazole    Tablet 40 milliGRAM(s) Oral before breakfast  senna 2 Tablet(s) Oral at bedtime  tamsulosin 0.4 milliGRAM(s) Oral at bedtime    MEDICATIONS  (PRN):  acetaminophen   Tablet. 650 milliGRAM(s) Oral every 6 hours PRN Mild Pain (1 - 3)  oxyCODONE    IR 5 milliGRAM(s) Oral every 4 hours PRN Moderate Pain (4 - 6)  oxyCODONE    IR 10 milliGRAM(s) Oral every 4 hours PRN Severe Pain (7 - 10)      LABS:	 	                                   13.8   8.92  )-----------( 132      ( 31 Mar 2018 05:48 )             41.6     04-01    141  |  102  |  18  ----------------------------<  158<H>  4.3   |  27  |  0.84    Ca    9.3      01 Apr 2018 05:15  Mg     2.0     04-01      	  	< from: Transthoracic Echocardiogram (03.23.18 @ 13:05) >  Patient name: OLSZEWSKI, WALDEMAR  YOB: 1950   Age: 67 (M)   MR#: [id number]  Study Date: 3/23/2018  Location: O/PSonographer: Meek Canseco [facility]  Study quality: Technically good  Referring Physician: Danny Escobedo MD / Williams Mejía MD  Blood Pressure: 133/71 mmHg  Height: 178 cm  Weight: 97 kg  BSA: 2.2 m2  ------------------------------------------------------------------------  PROCEDURE: Transthoracic echocardiogram with 2-D, M-Mode  and complete spectral and color flow Doppler.  INDICATION: Atherosclerotic heart disease of native  coronary artery without angina pectoris (I25.10)  ------------------------------------------------------------------------  DIMENSIONS:  Dimensions:     Normal Values:  LA:     3.5 cm    2.0 -4.0 cm  Ao:     2.8 cm    2.0 - 3.8 cm  SEPTUM: 1.0 cm    0.6 - 1.2 cm  PWT:    1.0 cm    0.6 - 1.1 cm  LVIDd:  6.0 cm    3.0 - 5.6 cm  LVIDs:  4.3 cm    1.8 - 4.0 cm  Derived Variables:  LVMI: 115 g/m2  RWT: 0.33  Fractional short: 28 %  Ejection Fraction (Visual Estimate): 50-55 %  ------------------------------------------------------------------------  OBSERVATIONS:  Mitral Valve: Mitral annular calcification, otherwise  normal mitral valve. Mild-moderate mitral regurgitation.  Aortic Root: Normal size aortic root. (Ao:2.8 cm).  Aortic Valve: Calcified trileaflet aortic valve with normal  opening. No aortic valve regurgitation seen.  Left Atrium: Normal left atrium.  LA volume index = 31  cc/m2.  Left Ventricle: Endocardium not well visualized; grossly  borderline-normal left ventricular systolic function.  Estimated LVEF 50-55%. Eccentric left ventricular  hypertrophy (dilated left ventricle with normal relative  wall thickness). Moderate diastolic dysfunction (Stage II).  Right Heart: Normal right atrium. Normal right ventricular  size and function. Normal tricuspid valve. Minimal  tricuspid regurgitation. Normal pulmonic valve.  Pericardium/PleuraNormal pericardium with no pericardial  effusion.  Hemodynamic: Estimated right ventricular systolic pressure  equals 44 mm Hg, assuming right atrial pressure equals 10  mm Hg, consistent with mild pulmonary hypertension.  ------------------------------------------------------------------------  CONCLUSIONS:  1. Mitral annular calcification, otherwise normal mitral  valve. Mild-moderate mitral regurgitation.  2. Eccentric left ventricular hypertrophy (dilated left  ventricle with normal relative wall thickness).  3. Endocardium not well visualized; grossly  borderline-normal left ventricular systolic function.  Estimated LVEF 50-55%.  4. Moderate diastolic dysfunction (Stage II).  5. Normal right ventricular size and function.  6. Normal tricuspid valve. Minimal tricuspid regurgitation.  7. Estimated pulmonary artery systolic pressure equals 44  mm Hg, assuming right atrial pressure equals 10  mm Hg,  consistent with mild pulmonary hypertension.  *** No previous Echo exam.  ------------------------------------------------------------------------  Confirmed on  3/23/2018- 14:26:32 by Danny Escobedo MD, [facility],  CESAR, LELA  ------------------------------------------------------------------------    < end of copied text >

## 2018-04-05 ENCOUNTER — EMERGENCY (EMERGENCY)
Facility: HOSPITAL | Age: 68
LOS: 1 days | Discharge: HOME | End: 2018-04-05
Attending: EMERGENCY MEDICINE | Admitting: EMERGENCY MEDICINE
Payer: MEDICAID

## 2018-04-05 VITALS
TEMPERATURE: 98 F | OXYGEN SATURATION: 100 % | SYSTOLIC BLOOD PRESSURE: 116 MMHG | DIASTOLIC BLOOD PRESSURE: 56 MMHG | HEART RATE: 54 BPM | RESPIRATION RATE: 16 BRPM

## 2018-04-05 DIAGNOSIS — Z98.890 OTHER SPECIFIED POSTPROCEDURAL STATES: Chronic | ICD-10-CM

## 2018-04-05 DIAGNOSIS — Z95.5 PRESENCE OF CORONARY ANGIOPLASTY IMPLANT AND GRAFT: Chronic | ICD-10-CM

## 2018-04-05 PROCEDURE — 99282 EMERGENCY DEPT VISIT SF MDM: CPT

## 2018-04-05 NOTE — ED PROVIDER NOTE - PSH
History of ear surgery    S/P primary angioplasty with coronary stent  5/17 - [employer] RX Lot [id number]

## 2018-04-05 NOTE — ED ADULT TRIAGE NOTE - CHIEF COMPLAINT QUOTE
Pt has indwelling johnson placed attached to leg bag, c/o pelvic pain and pressure. Pt had follow up appt with urologist tomorrow but pt unable to deal with the pain.

## 2018-04-05 NOTE — ED PROVIDER NOTE - OBJECTIVE STATEMENT
Cabot: 67M with recent lung resection who had a johnson placed last Wednesday for retention, here to have his johnson removed. He has an appt with urology tomorrow morning at 8am but wanted it out today.  He reports a feeling of fullness and pressure in the bladder.  No F/C/abd pain/N/V/D. Cabot: 67M with recent lung resection on 3/28 who had a johnson placed for retention, here to have his johnson removed. He has an appt with urology tomorrow morning at 8am but wanted it out today.  He reports a feeling of fullness and pressure in the bladder.  No F/C/abd pain/N/V/D.

## 2018-04-05 NOTE — ED PROVIDER NOTE - GENITOURINARY, MLM
johnson in place, no discharge, + yellow urine to johnson bag, bedside US shows collapsed bladder and no hydronephrosis.

## 2018-04-05 NOTE — ED PROVIDER NOTE - MEDICAL DECISION MAKING DETAILS
Cabot: 67M with recent lung resection who had a johnson placed last Wednesday for retention, here to have his johnson removed. He has an appt with urology tomorrow morning at 8am but wanted it out today.  He reports a feeling of fullness and pressure in the bladder.  No F/C/abd pain/N/V/D. On exam, HDS, NAD, abd benign, johnson in place, no discharge, + yellow urine to johnson bag, bedside US shows collapsed bladder and no hydronephrosis.  Will refer patient to urology office for TOV.

## 2018-04-06 ENCOUNTER — APPOINTMENT (OUTPATIENT)
Dept: UROLOGY | Facility: CLINIC | Age: 68
End: 2018-04-06
Payer: MEDICAID

## 2018-04-06 VITALS
WEIGHT: 202 LBS | TEMPERATURE: 97.7 F | SYSTOLIC BLOOD PRESSURE: 120 MMHG | BODY MASS INDEX: 29.92 KG/M2 | HEART RATE: 51 BPM | HEIGHT: 69 IN | RESPIRATION RATE: 16 BRPM | DIASTOLIC BLOOD PRESSURE: 57 MMHG

## 2018-04-06 DIAGNOSIS — R69 ILLNESS, UNSPECIFIED: ICD-10-CM

## 2018-04-06 PROCEDURE — 99203 OFFICE O/P NEW LOW 30 MIN: CPT

## 2018-04-06 PROCEDURE — 51798 US URINE CAPACITY MEASURE: CPT

## 2018-04-11 ENCOUNTER — APPOINTMENT (OUTPATIENT)
Dept: UROLOGY | Facility: CLINIC | Age: 68
End: 2018-04-11
Payer: MEDICAID

## 2018-04-11 LAB
APPEARANCE: CLEAR
BACTERIA UR CULT: ABNORMAL
BACTERIA: NEGATIVE
BILIRUBIN URINE: NEGATIVE
BLOOD URINE: ABNORMAL
COLOR: YELLOW
CORE LAB FLUID CYTOLOGY: NORMAL
GLUCOSE QUALITATIVE U: NEGATIVE MG/DL
HYALINE CASTS: 1 /LPF
KETONES URINE: NEGATIVE
LEUKOCYTE ESTERASE URINE: ABNORMAL
MICROSCOPIC-UA: NORMAL
NITRITE URINE: NEGATIVE
PH URINE: 5.5
PROTEIN URINE: NEGATIVE MG/DL
PSA SERPL-MCNC: 1.81 NG/ML
RED BLOOD CELLS URINE: 1 /HPF
SPECIFIC GRAVITY URINE: 1.01
SQUAMOUS EPITHELIAL CELLS: 0 /HPF
UROBILINOGEN URINE: NEGATIVE MG/DL
WHITE BLOOD CELLS URINE: 9 /HPF

## 2018-04-11 PROCEDURE — 99213 OFFICE O/P EST LOW 20 MIN: CPT

## 2018-04-11 PROCEDURE — 51798 US URINE CAPACITY MEASURE: CPT

## 2018-04-17 ENCOUNTER — APPOINTMENT (OUTPATIENT)
Dept: THORACIC SURGERY | Facility: CLINIC | Age: 68
End: 2018-04-17
Payer: MEDICAID

## 2018-04-17 VITALS
SYSTOLIC BLOOD PRESSURE: 130 MMHG | TEMPERATURE: 98.6 F | RESPIRATION RATE: 16 BRPM | WEIGHT: 200 LBS | HEART RATE: 42 BPM | BODY MASS INDEX: 29.54 KG/M2 | OXYGEN SATURATION: 99 % | DIASTOLIC BLOOD PRESSURE: 70 MMHG

## 2018-04-17 PROCEDURE — 99024 POSTOP FOLLOW-UP VISIT: CPT

## 2018-04-19 ENCOUNTER — APPOINTMENT (OUTPATIENT)
Dept: CARDIOLOGY | Facility: CLINIC | Age: 68
End: 2018-04-19

## 2018-04-24 ENCOUNTER — FORM ENCOUNTER (OUTPATIENT)
Age: 68
End: 2018-04-24

## 2018-04-25 ENCOUNTER — OUTPATIENT (OUTPATIENT)
Dept: OUTPATIENT SERVICES | Facility: HOSPITAL | Age: 68
LOS: 1 days | End: 2018-04-25
Payer: MEDICAID

## 2018-04-25 ENCOUNTER — APPOINTMENT (OUTPATIENT)
Dept: THORACIC SURGERY | Facility: CLINIC | Age: 68
End: 2018-04-25
Payer: MEDICAID

## 2018-04-25 ENCOUNTER — APPOINTMENT (OUTPATIENT)
Dept: RADIOLOGY | Facility: HOSPITAL | Age: 68
End: 2018-04-25

## 2018-04-25 VITALS
OXYGEN SATURATION: 96 % | DIASTOLIC BLOOD PRESSURE: 83 MMHG | SYSTOLIC BLOOD PRESSURE: 163 MMHG | RESPIRATION RATE: 16 BRPM | HEART RATE: 51 BPM

## 2018-04-25 DIAGNOSIS — Z98.890 OTHER SPECIFIED POSTPROCEDURAL STATES: Chronic | ICD-10-CM

## 2018-04-25 DIAGNOSIS — C34.91 MALIGNANT NEOPLASM OF UNSPECIFIED PART OF RIGHT BRONCHUS OR LUNG: ICD-10-CM

## 2018-04-25 DIAGNOSIS — Z95.5 PRESENCE OF CORONARY ANGIOPLASTY IMPLANT AND GRAFT: Chronic | ICD-10-CM

## 2018-04-25 PROCEDURE — 99024 POSTOP FOLLOW-UP VISIT: CPT

## 2018-04-25 PROCEDURE — 71046 X-RAY EXAM CHEST 2 VIEWS: CPT | Mod: 26

## 2018-04-25 RX ORDER — TAMSULOSIN HYDROCHLORIDE 0.4 MG/1
0.4 CAPSULE ORAL
Qty: 60 | Refills: 0 | Status: COMPLETED | COMMUNITY
Start: 2018-04-06 | End: 2018-04-25

## 2018-04-25 RX ORDER — OXYCODONE 5 MG/1
5 TABLET ORAL
Qty: 20 | Refills: 0 | Status: COMPLETED | COMMUNITY
Start: 2018-04-19 | End: 2018-04-25

## 2018-05-15 ENCOUNTER — APPOINTMENT (OUTPATIENT)
Dept: THORACIC SURGERY | Facility: CLINIC | Age: 68
End: 2018-05-15

## 2018-05-22 ENCOUNTER — APPOINTMENT (OUTPATIENT)
Dept: THORACIC SURGERY | Facility: CLINIC | Age: 68
End: 2018-05-22

## 2018-08-03 ENCOUNTER — MEDICATION RENEWAL (OUTPATIENT)
Age: 68
End: 2018-08-03

## 2018-08-03 PROBLEM — F17.200 NICOTINE DEPENDENCE, UNSPECIFIED, UNCOMPLICATED: Chronic | Status: ACTIVE | Noted: 2018-03-21

## 2018-08-03 PROBLEM — I71.4 ABDOMINAL AORTIC ANEURYSM, WITHOUT RUPTURE: Chronic | Status: ACTIVE | Noted: 2018-03-21

## 2018-08-31 ENCOUNTER — APPOINTMENT (OUTPATIENT)
Dept: UROLOGY | Facility: CLINIC | Age: 68
End: 2018-08-31
Payer: MEDICAID

## 2018-08-31 PROCEDURE — 51741 ELECTRO-UROFLOWMETRY FIRST: CPT

## 2018-08-31 PROCEDURE — 99213 OFFICE O/P EST LOW 20 MIN: CPT | Mod: 25

## 2018-08-31 PROCEDURE — 51798 US URINE CAPACITY MEASURE: CPT | Mod: 59

## 2018-09-06 ENCOUNTER — APPOINTMENT (OUTPATIENT)
Dept: CARDIOLOGY | Facility: CLINIC | Age: 68
End: 2018-09-06

## 2018-09-25 ENCOUNTER — APPOINTMENT (OUTPATIENT)
Dept: THORACIC SURGERY | Facility: CLINIC | Age: 68
End: 2018-09-25
Payer: MEDICAID

## 2018-09-25 VITALS
DIASTOLIC BLOOD PRESSURE: 74 MMHG | WEIGHT: 192 LBS | HEIGHT: 68 IN | HEART RATE: 52 BPM | RESPIRATION RATE: 15 BRPM | BODY MASS INDEX: 29.1 KG/M2 | SYSTOLIC BLOOD PRESSURE: 131 MMHG | OXYGEN SATURATION: 97 %

## 2018-09-25 DIAGNOSIS — Z80.42 FAMILY HISTORY OF MALIGNANT NEOPLASM OF PROSTATE: ICD-10-CM

## 2018-09-25 DIAGNOSIS — Z80.3 FAMILY HISTORY OF MALIGNANT NEOPLASM OF BREAST: ICD-10-CM

## 2018-09-25 PROCEDURE — 99213 OFFICE O/P EST LOW 20 MIN: CPT

## 2018-10-10 ENCOUNTER — APPOINTMENT (OUTPATIENT)
Dept: CARDIOLOGY | Facility: CLINIC | Age: 68
End: 2018-10-10
Payer: MEDICAID

## 2018-10-10 ENCOUNTER — NON-APPOINTMENT (OUTPATIENT)
Age: 68
End: 2018-10-10

## 2018-10-10 VITALS
BODY MASS INDEX: 30.31 KG/M2 | HEART RATE: 53 BPM | DIASTOLIC BLOOD PRESSURE: 65 MMHG | OXYGEN SATURATION: 94 % | WEIGHT: 200 LBS | SYSTOLIC BLOOD PRESSURE: 110 MMHG | HEIGHT: 68 IN

## 2018-10-10 DIAGNOSIS — G89.18 OTHER ACUTE POSTPROCEDURAL PAIN: ICD-10-CM

## 2018-10-10 DIAGNOSIS — Z01.811 ENCOUNTER FOR PREPROCEDURAL RESPIRATORY EXAMINATION: ICD-10-CM

## 2018-10-10 PROCEDURE — 93000 ELECTROCARDIOGRAM COMPLETE: CPT

## 2018-10-10 PROCEDURE — 99214 OFFICE O/P EST MOD 30 MIN: CPT

## 2018-10-10 RX ORDER — METOPROLOL TARTRATE 37.5 MG/1
37.5 TABLET, FILM COATED ORAL
Qty: 42 | Refills: 0 | Status: DISCONTINUED | COMMUNITY
Start: 2018-04-01 | End: 2018-10-10

## 2018-10-10 RX ORDER — TAMSULOSIN HYDROCHLORIDE 0.4 MG/1
0.4 CAPSULE ORAL
Qty: 180 | Refills: 3 | Status: DISCONTINUED | COMMUNITY
Start: 2018-04-11 | End: 2018-10-10

## 2018-10-15 PROBLEM — G89.18 POSTOPERATIVE PAIN: Status: RESOLVED | Noted: 2018-04-19 | Resolved: 2018-10-15

## 2018-10-15 PROBLEM — Z01.811 PRE-OPERATIVE RESPIRATORY EXAMINATION: Status: RESOLVED | Noted: 2018-03-23 | Resolved: 2018-10-15

## 2018-11-12 ENCOUNTER — OUTPATIENT (OUTPATIENT)
Dept: OUTPATIENT SERVICES | Facility: HOSPITAL | Age: 68
LOS: 1 days | End: 2018-11-12

## 2018-11-12 ENCOUNTER — APPOINTMENT (OUTPATIENT)
Dept: CV DIAGNOSTICS | Facility: HOSPITAL | Age: 68
End: 2018-11-12
Payer: MEDICAID

## 2018-11-12 DIAGNOSIS — Z95.5 PRESENCE OF CORONARY ANGIOPLASTY IMPLANT AND GRAFT: Chronic | ICD-10-CM

## 2018-11-12 DIAGNOSIS — Z98.890 OTHER SPECIFIED POSTPROCEDURAL STATES: Chronic | ICD-10-CM

## 2018-11-12 DIAGNOSIS — I25.10 ATHEROSCLEROTIC HEART DISEASE OF NATIVE CORONARY ARTERY WITHOUT ANGINA PECTORIS: ICD-10-CM

## 2018-11-12 PROCEDURE — 93016 CV STRESS TEST SUPVJ ONLY: CPT | Mod: GC

## 2018-11-12 PROCEDURE — 93018 CV STRESS TEST I&R ONLY: CPT | Mod: GC

## 2018-11-12 PROCEDURE — 78452 HT MUSCLE IMAGE SPECT MULT: CPT | Mod: 26

## 2018-11-28 ENCOUNTER — RESULT REVIEW (OUTPATIENT)
Age: 68
End: 2018-11-28

## 2018-12-05 ENCOUNTER — RX RENEWAL (OUTPATIENT)
Age: 68
End: 2018-12-05

## 2018-12-10 ENCOUNTER — RX RENEWAL (OUTPATIENT)
Age: 68
End: 2018-12-10

## 2019-02-15 ENCOUNTER — MEDICATION RENEWAL (OUTPATIENT)
Age: 69
End: 2019-02-15

## 2019-02-20 ENCOUNTER — APPOINTMENT (OUTPATIENT)
Dept: UROLOGY | Facility: CLINIC | Age: 69
End: 2019-02-20

## 2019-02-26 ENCOUNTER — APPOINTMENT (OUTPATIENT)
Dept: THORACIC SURGERY | Facility: CLINIC | Age: 69
End: 2019-02-26
Payer: MEDICAID

## 2019-02-27 ENCOUNTER — APPOINTMENT (OUTPATIENT)
Dept: THORACIC SURGERY | Facility: CLINIC | Age: 69
End: 2019-02-27
Payer: MEDICAID

## 2019-02-28 PROBLEM — R91.8 MULTIPLE LUNG NODULES: Status: ACTIVE | Noted: 2018-03-12

## 2019-03-05 ENCOUNTER — APPOINTMENT (OUTPATIENT)
Dept: THORACIC SURGERY | Facility: CLINIC | Age: 69
End: 2019-03-05
Payer: MEDICAID

## 2019-03-05 VITALS
BODY MASS INDEX: 30.31 KG/M2 | WEIGHT: 200 LBS | TEMPERATURE: 98.4 F | DIASTOLIC BLOOD PRESSURE: 84 MMHG | HEIGHT: 68 IN | SYSTOLIC BLOOD PRESSURE: 141 MMHG | HEART RATE: 54 BPM | RESPIRATION RATE: 16 BRPM | OXYGEN SATURATION: 94 %

## 2019-03-05 DIAGNOSIS — R91.8 OTHER NONSPECIFIC ABNORMAL FINDING OF LUNG FIELD: ICD-10-CM

## 2019-03-05 PROCEDURE — 99214 OFFICE O/P EST MOD 30 MIN: CPT

## 2019-03-05 RX ORDER — BENZONATATE 100 MG/1
100 CAPSULE ORAL 3 TIMES DAILY
Qty: 90 | Refills: 0 | Status: ACTIVE | COMMUNITY
Start: 2019-03-05 | End: 1900-01-01

## 2019-03-07 NOTE — ASSESSMENT
[FreeTextEntry1] : 67 y/o M, current smoker, w/ hx of CAD s/p RCA drug-eluting stent placement on 3/3/17, on ASA and Plavix, referred by Dr. Jarad Cheema for lung nodule (patient had wheezing and was sent for CXR).\par \par Now 11 mo s/p Rt VATS Robotic-assisted, wedge rxn of RUL nodule, completion RULobectomy, MLND, wedge rxn of RML for additional RUL margin on 3/28/18. Path revealed RUL AdenoCA, acinar predominant w/ additional component of micropapillary, papillary and solid areas, 2cm (total tumor size inclusive of invasive and lepidic components = 2.5cm), G2, STEPAN present, pT1c (possibly T2) N0Mx. RML (additional margin for RUL) wedge rxn was negative for malignancy.\par \par CT Chest on 2/23/19:\par - new 8 mm Rt upper lung subpleural nodule (image 20, series 3)\par - post-op changes\par \par PET/CT 3/4/19:\par - 8 mm nodule in anterior aspect of Rt upper lung, SUVmax=2.2\par - 7 mm nodule in anterolateral apical aspect of Rt lung, SUVmax=2.6\par - 11 mm nodule in posterior apical aspect of Rt lung, SUVmax=5.0\par - a few small areas of subsegmental atelectasis/scarring in both lungs\par - new focal SUVmax=2.8 acitivity centered on soft tissue anterior to Rt 9th rib laterally \par - 37 mm infrarenal abdominal aortic aneurysm extending into bilateral common illiac artery aneurysms\par - a 7 mm Rt hepatic lobe cysts\par \par I have reviewed the patient's medical records and diagnostic images at time of this office consultation and have made the following recommendation:\par 1. PET/CT reviewed with pt. The 11 mm nodule with SUVmax=5 on posterior Rt upper lung is concerning for malignancy. I recommended FNA of RML nodule with Dr. Silvio Kam. Risks and benefits and alternatives explained to patient, all questions answered, patient agreed to proceed with procedure.\par \par \par \par Written by Deanna Degroot NP, acting as a scribe for Dr. Williams Mejía. \par \par The documentation recorded by the scribe accurately reflects the service I personally performed and the decisions made by me. WILLIAMS MEJÍA MD

## 2019-03-07 NOTE — HISTORY OF PRESENT ILLNESS
[FreeTextEntry1] : 67 y/o M, current smoker, w/ hx of CAD s/p RCA drug-eluting stent placement on 3/3/17, on ASA and Plavix, referred by Dr. Jarad Cheema for lung nodule (patient had wheezing and was sent for CXR).\par \par CT Chest on 2/23/18:\par - 1.1cm RUL solid ill-defined nodule w/ ground glass rim\par - 9mm RUL ground glass nodule \par - 1.4 cm RUL ground glass nodule \par - nodular thickening on the oblique fissure in Lt lung w/ nodules up to 3mm\par - a cystic lesion in inferior aspect of RUL abutting horizontal fissure \par - 3mm calcified granuloma in RUL\par \par PET/CT on 3/7/18 (compared to 2/23/18):\par - stable 1.1cm SUV 3.2 RUL nodule \par - groundglass nodules in posterior RUL measures up to 1.4cm SUV 1.5\par - 2.7cm SUV 2.7 RUL cavitary lesion w/ a thick rim\par - 1.9cm SUV 2.4 round glass lesion in Rt humeral metaphysis w/ peripheral calcifications\par - 3.5cm infrarenal abd aortic aneurysm\par - 2.1cm Lt adrenal low attenuation nodule attribute to adenoma\par \par PFT on 3/20/18: FVC 72%, FEV1 65%, DLCO 77%.\par \par Now 11 mo s/p Rt VATS Robotic-assisted, wedge rxn of RUL nodule, completion RULobectomy, MLND, wedge rxn of RML for additional RUL margin on 3/28/18. Path revealed RUL AdenoCA, acinar predominant w/ additional component of micropapillary, papillary and solid areas, 2cm (total tumor size inclusive of invasive and lepidic components = 2.5cm), G2, STEPAN present, pT1c (possibly T2) N0Mx. RML (additional margin for RUL) wedge rxn was negative for malignancy.\par \par CT Chest on 9/22/18:\par - two stable nodules inseparable from Lt oblique fissure (series 3 image 56 and 57), 4mm and 3mm\par - post-op changes\par - minimal Rt-sided pleural effusion\par \par CT Chest on 2/23/19:\par - new 8 mm RUL subpleural nodule (image 20, series 3)\par - post-op changes\par \par PET/CT 3/4/19:\par - 8 mm nodule in anterior aspect of RUL, SUVmax=2.2\par - 7 mm nodule in anterolateral apical aspect of Rt lung, SUVmax=2.6\par - 11 mm nodule in posterior apical aspect of Rt lung, SUVmax=5.0\par - a few small areas of subsegmental atelectasis/scarring in both lungs\par - new focal SUVmax=2.8 acitivity centered on soft tissue anterior to Rt 9th rib laterally \par - 37 mm infrarenal abdominal aortic aneurysm extending into bilateral common illiac artery aneurysms\par - a 7 mm Rt hepatic lobe cysts\par  \par Pt is here today for follow up. Pt reports cough with white mucus and SOB on exertion, denies CP, fever, chills, hemoptysis.

## 2019-03-07 NOTE — REVIEW OF SYSTEMS
[As Noted in HPI] : as noted in HPI [Cough] : cough [SOB on Exertion] : shortness of breath during exertion [Negative] : Heme/Lymph [Wheezing] : no wheezing

## 2019-03-07 NOTE — CONSULT LETTER
[FreeTextEntry2] : Jarad Cheema MD (PCP)  [FreeTextEntry3] : Williams Mejía MD, MPH \par System Director of Thoracic Surgery \par Director of Comprehensive [facility] [place] \par Professor Cardiovascular & Thoracic Surgery  \par [employer] [facility] [employer] [facility]\par

## 2019-03-07 NOTE — DATA REVIEWED
[FreeTextEntry1] : CT Chest on 2/23/19:\par - new 8 mm RUL subpleural nodule (image 20, series 3)\par - post-op changes\par \par PET/CT 3/4/19:\par - 8 mm nodule in anterior aspect of RUL, SUVmax=2.2\par - 7 mm nodule in anterolateral apical aspect of Rt lung, SUVmax=2.6\par - 11 mm nodule in posterior apical aspect of Rt lung, SUVmax=5.0\par - a few small areas of subsegmental atelectasis/scarring in both lungs\par - new focal SUVmax=2.8 acitivity centered on soft tissue anterior to Rt 9th rib laterally \par - 37 mm infrarenal abdominal aortic aneurysm extending into bilateral common illiac artery aneurysms\par - a 7 mm Rt hepatic lobe cysts

## 2019-03-13 ENCOUNTER — NON-APPOINTMENT (OUTPATIENT)
Age: 69
End: 2019-03-13

## 2019-03-13 ENCOUNTER — APPOINTMENT (OUTPATIENT)
Dept: CARDIOLOGY | Facility: CLINIC | Age: 69
End: 2019-03-13
Payer: MEDICAID

## 2019-03-13 VITALS
OXYGEN SATURATION: 97 % | SYSTOLIC BLOOD PRESSURE: 126 MMHG | HEART RATE: 58 BPM | WEIGHT: 201 LBS | DIASTOLIC BLOOD PRESSURE: 77 MMHG | BODY MASS INDEX: 30.46 KG/M2 | HEIGHT: 68 IN

## 2019-03-13 PROCEDURE — 99214 OFFICE O/P EST MOD 30 MIN: CPT

## 2019-03-13 PROCEDURE — 93000 ELECTROCARDIOGRAM COMPLETE: CPT

## 2019-03-13 RX ORDER — RAMIPRIL 10 MG/1
10 CAPSULE ORAL
Qty: 30 | Refills: 5 | Status: DISCONTINUED | COMMUNITY
Start: 1900-01-01 | End: 2019-03-13

## 2019-03-17 RX ORDER — GABAPENTIN 300 MG/1
300 CAPSULE ORAL TWICE DAILY
Qty: 60 | Refills: 2 | Status: DISCONTINUED | COMMUNITY
Start: 2018-04-25 | End: 2019-03-17

## 2019-03-17 NOTE — HISTORY OF PRESENT ILLNESS
[FreeTextEntry1] : Doing okay. Coughing is much improved since stopping ramipril. Denies chest pain or palpitations. Occasional shortness of breath on exertion. Can walk half a mile without stopping if he takes his time. Anticipating biopsy procedure for possible recurrent lung cancer.

## 2019-03-17 NOTE — DISCUSSION/SUMMARY
[Coronary Artery Disease] : coronary artery disease [Hypertension] : hypertension [Stable] : stable [FreeTextEntry1] : \par Currently stable from a cardiovascular standpoint. Normotensive. Euvolemic. Stable CAD. Shortness of breath likely secondary to underlying pulmonary issues. Continue current medications (ramipril discontinued). ECG completed today and reviewed. At this time, patient is considered an acceptable risk from a cardiac standpoint for the anticipated biopsy procedure. May hold clopidogrel for 5 days prior to procedure. Follow up in 6 months.

## 2019-03-17 NOTE — REVIEW OF SYSTEMS
[see HPI] : see HPI [Dyspnea on exertion] : dyspnea during exertion [Negative] : Heme/Lymph [Chest  Pressure] : no chest pressure [Chest Pain] : no chest pain [Lower Ext Edema] : no extremity edema [Leg Claudication] : no intermittent leg claudication [Palpitations] : no palpitations

## 2019-03-17 NOTE — PHYSICAL EXAM
[General Appearance - Well Developed] : well developed [Normal Appearance] : normal appearance [Well Groomed] : well groomed [General Appearance - Well Nourished] : well nourished [No Deformities] : no deformities [General Appearance - In No Acute Distress] : no acute distress [Normal Conjunctiva] : the conjunctiva exhibited no abnormalities [Eyelids - No Xanthelasma] : the eyelids demonstrated no xanthelasmas [Normal Oral Mucosa] : normal oral mucosa [No Oral Pallor] : no oral pallor [No Oral Cyanosis] : no oral cyanosis [Respiration, Rhythm And Depth] : normal respiratory rhythm and effort [Exaggerated Use Of Accessory Muscles For Inspiration] : no accessory muscle use [Heart Rate And Rhythm] : heart rate and rhythm were normal [Heart Sounds] : normal S1 and S2 [Murmurs] : no murmurs present [Edema] : no peripheral edema present [Abdomen Soft] : soft [Abdomen Tenderness] : non-tender [Abnormal Walk] : normal gait [Nail Clubbing] : no clubbing of the fingernails [Cyanosis, Localized] : no localized cyanosis [Petechial Hemorrhages (___cm)] : no petechial hemorrhages [Skin Color & Pigmentation] : normal skin color and pigmentation [] : no rash [No Venous Stasis] : no venous stasis [Skin Lesions] : no skin lesions [No Skin Ulcers] : no skin ulcer [No Xanthoma] : no  xanthoma was observed [Oriented To Time, Place, And Person] : oriented to person, place, and time [FreeTextEntry1] : expiratory rhonchi noted in both lungs

## 2019-03-18 ENCOUNTER — MEDICATION RENEWAL (OUTPATIENT)
Age: 69
End: 2019-03-18

## 2019-03-20 ENCOUNTER — APPOINTMENT (OUTPATIENT)
Dept: INTERVENTIONAL RADIOLOGY/VASCULAR | Facility: CLINIC | Age: 69
End: 2019-03-20
Payer: MEDICAID

## 2019-03-20 VITALS
SYSTOLIC BLOOD PRESSURE: 138 MMHG | WEIGHT: 200 LBS | BODY MASS INDEX: 30.31 KG/M2 | HEIGHT: 68 IN | OXYGEN SATURATION: 97 % | HEART RATE: 60 BPM | DIASTOLIC BLOOD PRESSURE: 66 MMHG | RESPIRATION RATE: 16 BRPM

## 2019-03-20 DIAGNOSIS — Z85.118 PERSONAL HISTORY OF OTHER MALIGNANT NEOPLASM OF BRONCHUS AND LUNG: ICD-10-CM

## 2019-03-20 PROCEDURE — 99204 OFFICE O/P NEW MOD 45 MIN: CPT

## 2019-03-20 RX ORDER — ASPIRIN 81 MG
81 TABLET, DELAYED RELEASE (ENTERIC COATED) ORAL
Refills: 0 | Status: ACTIVE | COMMUNITY

## 2019-03-20 NOTE — PHYSICAL EXAM
[Alert] : alert [No Acute Distress] : no acute distress [Well Developed] : well developed [Normal Sclera/Conjunctiva] : normal sclera/conjunctiva [Normal Hearing] : hearing was normal [No Neck Mass] : no neck mass was observed [Supple] : the neck was supple [No Respiratory Distress] : no respiratory distress [Normal Rate and Effort] : normal respiratory rhythm and effort [Normal PMI] : the apical impulse was normal [Clear to Auscultation] : lungs were clear to auscultation bilaterally [Normal Rate] : heart rate was normal  [Normal S1, S2] : normal S1 and S2 [Not Tender] : non-tender [Normal Bowel Sounds] : normal bowel sounds [Normal Gait] : normal gait [No CVA Tenderness] : no ~M costovertebral angle tenderness [Soft] : abdomen soft [Normal Strength/Tone] : muscle strength and tone were normal [No Joint Swelling] : no joint swelling seen [No Rash] : no rash [No Motor Deficits] : the motor exam was normal [Oriented x3] : oriented to person, place, and time [Normal Insight/Judgement] : insight and judgment were intact [Normal Affect] : the affect was normal

## 2019-03-20 NOTE — REASON FOR VISIT
[Consultation] : a consultation visit [Family Member] : family member [Source: ______] : History obtained from [unfilled] [FreeTextEntry1] : right middle lobe lung mass biopsy

## 2019-03-20 NOTE — CONSULT LETTER
[Dear  ___] : Dear  [unfilled], [Consult Letter:] : I had the pleasure of evaluating your patient, [unfilled]. [Please see my note below.] : Please see my note below. [Consult Closing:] : Thank you very much for allowing me to participate in the care of this patient.  If you have any questions, please do not hesitate to contact me. [Sincerely,] : Sincerely, [FreeTextEntry2] : Dr. Williams Mejía

## 2019-03-20 NOTE — HISTORY OF PRESENT ILLNESS
[FreeTextEntry1] : 68 years old male with hx of CAD with stents placement in 2017. Patient was found to have lung nodule after patient was having trouble breathing and wheezing. He had a chest xray done initially followed by CT chest and PET/CT scan in 2018. S/P Rt VATS Robotic-assisted, wedge rxn of RUL nodule, completion RULobectomy, MLND, wedge rxn of RML for additional RUL margin on 3/28/18. Path revealed RUL AdenoCA, acinar predominant w/ additional component of micropapillary, papillary and solid areas. \par \par Patient had f/u PET/Ct scan done on 03/04/19 which demonstrated, "- 8 mm nodule in anterior aspect of RUL, SUVmax=2.2, 7 mm nodule in anterolateral apical aspect of Rt lung, SUVmax=2.6, 11 mm nodule in posterior apical aspect of Rt lung, SUVmax=5.0, a few small areas of subsegmental atelectasis/scarring in both lungs,  new focal SUVmax 2.8 activity centered on soft tissue anterior to Rt 9th rib laterally,  37 mm infrarenal abdominal aortic aneurysm extending into bilateral common iliac artery aneurysms, a 7 mm Rt hepatic lobe cysts".\par \par Patient was seen by Dr. Mejía and referred for right middle lobe lung mass biopsy. \par \par Denies any recent SOB, CP, fever, chills, n/v/d, hemoptysis.\par  \par Of note: the patient was seen by his cardiologist Dr. Anish Mejía who has cleared the patient to hold Plavix 5 days prior to the biopsy.

## 2019-03-20 NOTE — ASSESSMENT
[Other: _____] : [unfilled] [FreeTextEntry1] : Translation was provided by the patient's daughter. Patient refused telephone translation service.\par \par \par This is a 68 years old Polish-speaking male with right upper lobe lung cancer s/p right upper lobectomy and wedge resection of right middle lobe. Recent PET/CT on 03/04/19 demonstrated a 11 mm hypermetabolic right middle lobe nodule concerning for recurrent malignancy. This lung nodule is amenable to percutaneous needle biopsy. The risks of this procedure including bleeding, infection, tumor seeding, pneumothorax, and neurologic injury were discussed. Patient expressed understanding and informed consent was obtained.\par \par The patient will likely benefit from percutaneous cryoablation of the lung nodule, pending discussion with thoracic surgeon Dr. Williams Mejía.

## 2019-03-25 ENCOUNTER — OUTPATIENT (OUTPATIENT)
Dept: OUTPATIENT SERVICES | Facility: HOSPITAL | Age: 69
LOS: 1 days | End: 2019-03-25

## 2019-03-25 ENCOUNTER — RX CHANGE (OUTPATIENT)
Age: 69
End: 2019-03-25

## 2019-03-25 VITALS
RESPIRATION RATE: 18 BRPM | WEIGHT: 199.96 LBS | DIASTOLIC BLOOD PRESSURE: 82 MMHG | HEART RATE: 64 BPM | TEMPERATURE: 98 F | OXYGEN SATURATION: 98 % | SYSTOLIC BLOOD PRESSURE: 134 MMHG | HEIGHT: 68 IN

## 2019-03-25 DIAGNOSIS — R91.8 OTHER NONSPECIFIC ABNORMAL FINDING OF LUNG FIELD: ICD-10-CM

## 2019-03-25 DIAGNOSIS — Z95.5 PRESENCE OF CORONARY ANGIOPLASTY IMPLANT AND GRAFT: Chronic | ICD-10-CM

## 2019-03-25 DIAGNOSIS — Z98.890 OTHER SPECIFIED POSTPROCEDURAL STATES: Chronic | ICD-10-CM

## 2019-03-25 DIAGNOSIS — R91.1 SOLITARY PULMONARY NODULE: ICD-10-CM

## 2019-03-25 LAB
ALBUMIN SERPL ELPH-MCNC: 4.1 G/DL — SIGNIFICANT CHANGE UP (ref 3.3–5)
ALP SERPL-CCNC: 54 U/L — SIGNIFICANT CHANGE UP (ref 40–120)
ALT FLD-CCNC: 19 U/L — SIGNIFICANT CHANGE UP (ref 4–41)
ANION GAP SERPL CALC-SCNC: 12 MMO/L — SIGNIFICANT CHANGE UP (ref 7–14)
APTT BLD: 29.5 SEC — SIGNIFICANT CHANGE UP (ref 27.5–36.3)
AST SERPL-CCNC: 15 U/L — SIGNIFICANT CHANGE UP (ref 4–40)
BILIRUB SERPL-MCNC: 0.3 MG/DL — SIGNIFICANT CHANGE UP (ref 0.2–1.2)
BUN SERPL-MCNC: 18 MG/DL — SIGNIFICANT CHANGE UP (ref 7–23)
CALCIUM SERPL-MCNC: 9 MG/DL — SIGNIFICANT CHANGE UP (ref 8.4–10.5)
CHLORIDE SERPL-SCNC: 105 MMOL/L — SIGNIFICANT CHANGE UP (ref 98–107)
CO2 SERPL-SCNC: 25 MMOL/L — SIGNIFICANT CHANGE UP (ref 22–31)
CREAT SERPL-MCNC: 0.75 MG/DL — SIGNIFICANT CHANGE UP (ref 0.5–1.3)
GLUCOSE SERPL-MCNC: 108 MG/DL — HIGH (ref 70–99)
HCT VFR BLD CALC: 39.9 % — SIGNIFICANT CHANGE UP (ref 39–50)
HGB BLD-MCNC: 13 G/DL — SIGNIFICANT CHANGE UP (ref 13–17)
INR BLD: 1.13 — SIGNIFICANT CHANGE UP (ref 0.88–1.17)
MCHC RBC-ENTMCNC: 30.7 PG — SIGNIFICANT CHANGE UP (ref 27–34)
MCHC RBC-ENTMCNC: 32.6 % — SIGNIFICANT CHANGE UP (ref 32–36)
MCV RBC AUTO: 94.3 FL — SIGNIFICANT CHANGE UP (ref 80–100)
NRBC # FLD: 0 K/UL — SIGNIFICANT CHANGE UP (ref 0–0)
PLATELET # BLD AUTO: 155 K/UL — SIGNIFICANT CHANGE UP (ref 150–400)
PMV BLD: 11.5 FL — SIGNIFICANT CHANGE UP (ref 7–13)
POTASSIUM SERPL-MCNC: 4 MMOL/L — SIGNIFICANT CHANGE UP (ref 3.5–5.3)
POTASSIUM SERPL-SCNC: 4 MMOL/L — SIGNIFICANT CHANGE UP (ref 3.5–5.3)
PROT SERPL-MCNC: 6.9 G/DL — SIGNIFICANT CHANGE UP (ref 6–8.3)
PROTHROM AB SERPL-ACNC: 12.6 SEC — SIGNIFICANT CHANGE UP (ref 9.8–13.1)
RBC # BLD: 4.23 M/UL — SIGNIFICANT CHANGE UP (ref 4.2–5.8)
RBC # FLD: 14.2 % — SIGNIFICANT CHANGE UP (ref 10.3–14.5)
SODIUM SERPL-SCNC: 142 MMOL/L — SIGNIFICANT CHANGE UP (ref 135–145)
WBC # BLD: 7.37 K/UL — SIGNIFICANT CHANGE UP (ref 3.8–10.5)
WBC # FLD AUTO: 7.37 K/UL — SIGNIFICANT CHANGE UP (ref 3.8–10.5)

## 2019-03-25 RX ORDER — ATORVASTATIN CALCIUM 80 MG/1
1 TABLET, FILM COATED ORAL
Qty: 0 | Refills: 0 | COMMUNITY

## 2019-03-25 RX ORDER — AMLODIPINE BESYLATE 2.5 MG/1
1 TABLET ORAL
Qty: 0 | Refills: 0 | COMMUNITY

## 2019-03-25 RX ORDER — RAMIPRIL 5 MG
1 CAPSULE ORAL
Qty: 0 | Refills: 0 | COMMUNITY

## 2019-03-25 RX ORDER — IPRATROPIUM BROMIDE 0.2 MG/ML
0 SOLUTION, NON-ORAL INHALATION
Qty: 0 | Refills: 0 | COMMUNITY

## 2019-03-25 RX ORDER — ISOSORBIDE MONONITRATE 60 MG/1
1 TABLET, EXTENDED RELEASE ORAL
Qty: 0 | Refills: 0 | COMMUNITY

## 2019-03-25 RX ORDER — ROSUVASTATIN CALCIUM 5 MG/1
1 TABLET ORAL
Qty: 0 | Refills: 0 | COMMUNITY

## 2019-03-25 RX ORDER — CLOPIDOGREL BISULFATE 75 MG/1
1 TABLET, FILM COATED ORAL
Qty: 0 | Refills: 0 | COMMUNITY

## 2019-03-25 NOTE — H&P PST ADULT - NSICDXPASTMEDICALHX_GEN_ALL_CORE_FT
PAST MEDICAL HISTORY:  AAA (abdominal aortic aneurysm)     BPH (benign prostatic hyperplasia)     Coronary insufficiency     HLD (hyperlipidemia)     HTN (hypertension)     Lung nodule     MI (myocardial infarction)     Smoking

## 2019-03-25 NOTE — H&P PST ADULT - NEGATIVE GASTROINTESTINAL SYMPTOMS
no diarrhea/no abdominal pain/no melena/no jaundice/no flatulence/no hematochezia/no steatorrhea/no hiccoughs/no nausea/no constipation/no change in bowel habits/no vomiting

## 2019-03-25 NOTE — H&P PST ADULT - NEGATIVE PSYCHIATRIC SYMPTOMS
no suicidal ideation/no memory loss/no mood swings/no agitation/no anxiety/no insomnia/no paranoia/no visual hallucinations/no hyperactivity/no depression/no auditory hallucinations

## 2019-03-25 NOTE — H&P PST ADULT - MALE-SPECIFIC SYMPTOMS
urethral discharge/genital sores/erectile dysfunction/scrotal mass R/impotence/scrotal mass L/undescended testicle R/ejaculatory dysfunction/undescended testicle L

## 2019-03-25 NOTE — H&P PST ADULT - NEGATIVE GENERAL GENITOURINARY SYMPTOMS
no hematuria/no dysuria/no renal colic/no bladder infections/no flank pain R/no urine discoloration/no incontinence/no flank pain L/no urinary hesitancy

## 2019-03-25 NOTE — H&P PST ADULT - NEGATIVE ENMT SYMPTOMS
no recurrent cold sores/no throat pain/no tinnitus/no dry mouth/no nasal congestion/no dysphagia/no nasal obstruction/no ear pain/no nasal discharge/no post-nasal discharge/no nose bleeds/no abnormal taste sensation/no sinus symptoms/no gum bleeding

## 2019-03-25 NOTE — H&P PST ADULT - HISTORY OF PRESENT ILLNESS
Pt is a 68 yr old male w/ PMH of lung ca S/P right Video Assisted Thoracoscopy/ Right Upper Lobe Wedge Resection wtih Dr Mejía last 3/26/18 - pt speaks Polish and translation done by daughter. Pt hx of CAD stent placed 5/17 (on Plavix), HLD, AAA that is under observation, BPH, HTN and smoking.   Presents to PST w/ a preop dx of right middle lobe mass and to be evaluated for a scheduled right middle lobe biopsy on 3/28/19.   Daughter states on 2/2019 a f/u CT scan of chest done and a new nodule noted, seen by Dr Mejía and pt recommended to perform a bx (less invasive) so via IR procedure is now scheduled w/ Dr Butler. Pt is a 68 yr old male w/ PMH of lung ca S/P right Video Assisted Thoracoscopy/ Right Upper Lobe Wedge Resection wtih Dr Mejía last 3/26/18 - pt speaks Polish and translation done by daughter. Pt hx of CAD stent placed 5/17 (on Plavix), HLD, AAA that is under observation, BPH, HTN and smoking.   Presents to PST w/ a preop dx of right middle lobe mass and to be evaluated for a scheduled right middle lobe biopsy on 3/28/19.   Daughter states on 2/2019 a f/u PETscan done and a new nodule noted, seen by Dr Mejía and pt recommended to perform an IR bx (less invasive to a percutaneous needle bx). IR procedure is now scheduled w/ Dr Butler.

## 2019-03-25 NOTE — H&P PST ADULT - NEGATIVE MUSCULOSKELETAL SYMPTOMS
no joint swelling/no stiffness/no neck pain/no arm pain R/no leg pain R/no myalgia/no muscle cramps/no arm pain L/no muscle weakness/no leg pain L

## 2019-03-25 NOTE — H&P PST ADULT - NSICDXPROBLEM_GEN_ALL_CORE_FT
PROBLEM DIAGNOSES  Problem: Mass of middle lobe of right lung  Assessment and Plan: pt is scheduled for a right middle lobe biopsy on 3/28/19. Preop instructions provided including NPO status, pepcid and hibiclense. Daughter translated for pt and verbilized understanding.   Pt stopped plavix as per cardiologist instructed on 3/23/19. CC in chart from allscripts done by Dr Mejía.  pt to c/w Bisoprolol, flomax, amlodipine, atorvastatin and isosorbide as ordered in am and may take all in am dos w/ a sip of water.   HCP provided  Meets KARLEE precautions criteria, OR booking notified

## 2019-03-25 NOTE — H&P PST ADULT - NSICDXPASTSURGICALHX_GEN_ALL_CORE_FT
PAST SURGICAL HISTORY:  H/O knee surgery rt knee meniscus    History of ear surgery     History of lung surgery S/P Rt VATS wedge on 2018    S/P primary angioplasty with coronary stent 5/17 - [employer] RX Lot [id number]

## 2019-03-25 NOTE — H&P PST ADULT - NSICDXFAMILYHX_GEN_ALL_CORE_FT
FAMILY HISTORY:  Sibling  Still living? Unknown  Family history of coronary artery disease, Age at diagnosis: Age Unknown

## 2019-03-25 NOTE — H&P PST ADULT - RS GEN PE MLT RESP DETAILS PC
no rhonchi/clear to auscultation bilaterally/no wheezes/no chest wall tenderness/no intercostal retractions/airway patent/no rales/respirations non-labored/diminished breath sounds, R clear to auscultation bilaterally/diminished breath sounds, R/no wheezes/no rales/respirations non-labored/no chest wall tenderness/rhonchi/airway patent/no intercostal retractions

## 2019-03-25 NOTE — H&P PST ADULT - NEGATIVE OPHTHALMOLOGIC SYMPTOMS
no loss of vision R/no scleral injection R/no diplopia/no blurred vision L/no pain R/no pain L/no lacrimation L/no lacrimation R/no blurred vision R/no discharge R/no irritation L/no irritation R/no scleral injection L/no photophobia/no discharge L/no loss of vision L

## 2019-03-25 NOTE — H&P PST ADULT - NEGATIVE NEUROLOGICAL SYMPTOMS
no transient paralysis/no difficulty walking/no syncope/no confusion/no headache/no facial palsy/no vertigo/no loss of sensation/no weakness/no generalized seizures/no focal seizures/no loss of consciousness/no hemiparesis/no paresthesias/no tremors

## 2019-03-25 NOTE — H&P PST ADULT - NEGATIVE SKIN SYMPTOMS
no change in size/color of mole/no tumor/no pitted nails/no rash/no itching/no dryness/no brittle nails

## 2019-03-25 NOTE — H&P PST ADULT - NEGATIVE BREAST SYMPTOMS
no breast lump L/no nipple discharge R/no breast lump R/no nipple discharge L/no breast tenderness L/no breast tenderness R

## 2019-03-25 NOTE — H&P PST ADULT - NEGATIVE GENERAL SYMPTOMS
no chills/no sweating/no anorexia/no polydipsia/no weight gain/no polyphagia/no malaise/no polyuria/no fatigue/no fever

## 2019-03-25 NOTE — H&P PST ADULT - ABILITY TO HEAR (WITH HEARING AID OR HEARING APPLIANCE IF NORMALLY USED):
Mildly to Moderately Impaired: difficulty hearing in some environments or speaker may need to increase volume or speak distinctly/rt H/A in place

## 2019-03-27 ENCOUNTER — FORM ENCOUNTER (OUTPATIENT)
Age: 69
End: 2019-03-27

## 2019-03-27 PROBLEM — N40.0 BENIGN PROSTATIC HYPERPLASIA WITHOUT LOWER URINARY TRACT SYMPTOMS: Chronic | Status: ACTIVE | Noted: 2019-03-25

## 2019-03-28 ENCOUNTER — RESULT REVIEW (OUTPATIENT)
Age: 69
End: 2019-03-28

## 2019-03-28 ENCOUNTER — OUTPATIENT (OUTPATIENT)
Dept: OUTPATIENT SERVICES | Facility: HOSPITAL | Age: 69
LOS: 1 days | End: 2019-03-28
Payer: MEDICAID

## 2019-03-28 DIAGNOSIS — Z95.5 PRESENCE OF CORONARY ANGIOPLASTY IMPLANT AND GRAFT: Chronic | ICD-10-CM

## 2019-03-28 DIAGNOSIS — Z98.890 OTHER SPECIFIED POSTPROCEDURAL STATES: Chronic | ICD-10-CM

## 2019-03-28 DIAGNOSIS — R91.8 OTHER NONSPECIFIC ABNORMAL FINDING OF LUNG FIELD: ICD-10-CM

## 2019-03-28 PROCEDURE — 77012 CT SCAN FOR NEEDLE BIOPSY: CPT | Mod: 26,59

## 2019-03-28 PROCEDURE — 32557 INSERT CATH PLEURA W/ IMAGE: CPT | Mod: RT

## 2019-03-28 PROCEDURE — 32405: CPT

## 2019-03-28 PROCEDURE — 71045 X-RAY EXAM CHEST 1 VIEW: CPT | Mod: 26

## 2019-03-28 PROCEDURE — 88173 CYTOPATH EVAL FNA REPORT: CPT | Mod: 26

## 2019-03-28 PROCEDURE — 88305 TISSUE EXAM BY PATHOLOGIST: CPT | Mod: 26

## 2019-04-02 DIAGNOSIS — J95.811 POSTPROCEDURAL PNEUMOTHORAX: ICD-10-CM

## 2019-04-02 DIAGNOSIS — Z90.2 ACQUIRED ABSENCE OF LUNG [PART OF]: ICD-10-CM

## 2019-04-02 DIAGNOSIS — R91.1 SOLITARY PULMONARY NODULE: ICD-10-CM

## 2019-04-03 LAB — NON-GYNECOLOGICAL CYTOLOGY STUDY: SIGNIFICANT CHANGE UP

## 2019-04-05 PROBLEM — R91.1 NODULE OF RIGHT LUNG: Status: ACTIVE | Noted: 2019-03-20

## 2019-04-08 RX ORDER — BISOPROLOL FUMARATE 10 MG/1
10 TABLET, FILM COATED ORAL DAILY
Qty: 30 | Refills: 5 | Status: DISCONTINUED | COMMUNITY
Start: 2018-12-10 | End: 2019-04-08

## 2019-04-09 ENCOUNTER — OUTPATIENT (OUTPATIENT)
Dept: OUTPATIENT SERVICES | Facility: HOSPITAL | Age: 69
LOS: 1 days | Discharge: HOME | End: 2019-04-09

## 2019-04-09 ENCOUNTER — APPOINTMENT (OUTPATIENT)
Dept: THORACIC SURGERY | Facility: CLINIC | Age: 69
End: 2019-04-09
Payer: MEDICAID

## 2019-04-09 VITALS
TEMPERATURE: 98 F | BODY MASS INDEX: 30.62 KG/M2 | OXYGEN SATURATION: 95 % | HEART RATE: 57 BPM | HEIGHT: 68 IN | RESPIRATION RATE: 18 BRPM | SYSTOLIC BLOOD PRESSURE: 126 MMHG | DIASTOLIC BLOOD PRESSURE: 73 MMHG | WEIGHT: 202 LBS

## 2019-04-09 DIAGNOSIS — Z98.890 OTHER SPECIFIED POSTPROCEDURAL STATES: Chronic | ICD-10-CM

## 2019-04-09 DIAGNOSIS — C34.90 MALIGNANT NEOPLASM OF UNSPECIFIED PART OF UNSPECIFIED BRONCHUS OR LUNG: ICD-10-CM

## 2019-04-09 DIAGNOSIS — Z95.5 PRESENCE OF CORONARY ANGIOPLASTY IMPLANT AND GRAFT: Chronic | ICD-10-CM

## 2019-04-09 DIAGNOSIS — R91.1 SOLITARY PULMONARY NODULE: ICD-10-CM

## 2019-04-09 PROCEDURE — 99215 OFFICE O/P EST HI 40 MIN: CPT

## 2019-04-09 RX ORDER — OXYCODONE AND ACETAMINOPHEN 5; 325 MG/1; MG/1
5-325 TABLET ORAL
Qty: 42 | Refills: 0 | Status: DISCONTINUED | COMMUNITY
Start: 2018-12-13

## 2019-04-09 RX ORDER — CEFUROXIME AXETIL 250 MG/1
250 TABLET ORAL
Qty: 20 | Refills: 0 | Status: DISCONTINUED | COMMUNITY
Start: 2018-11-28

## 2019-04-11 NOTE — CONSULT LETTER
[Dear  ___] : Dear  [unfilled], [Consult Letter:] : I had the pleasure of evaluating your patient, [unfilled]. [( Thank you for referring [unfilled] for consultation for _____ )] : Thank you for referring [unfilled] for consultation for [unfilled] [Please see my note below.] : Please see my note below. [Consult Closing:] : Thank you very much for allowing me to participate in the care of this patient.  If you have any questions, please do not hesitate to contact me. [Sincerely,] : Sincerely, [FreeTextEntry3] : Williams Mejía MD, MPH \par System Director of Thoracic Surgery \par Director of Comprehensive Lung [facility] [place] \par Professor Cardiovascular & Thoracic Surgery  \par [facility] [employer] [facility]\par  [FreeTextEntry2] : Jarad Cheema MD (PCP)

## 2019-04-11 NOTE — DATA REVIEWED
[FreeTextEntry1] : PET/CT 3/4/19:\par - 8 mm nodule in anterior aspect of RUL, SUVmax=2.2\par - 7 mm nodule in anterolateral apical aspect of Rt lung, SUVmax=2.6\par - 11 mm nodule in posterior apical aspect of Rt lung, SUVmax=5.0\par - a few small areas of subsegmental atelectasis/scarring in both lungs\par - new focal SUVmax=2.8 acitivity centered on soft tissue anterior to Rt 9th rib laterally \par - 37 mm infrarenal abdominal aortic aneurysm extending into bilateral common illiac artery aneurysms\par - a 7 mm Rt hepatic lobe cysts\par \par FNA of RML on 3/28/19. Path showed atypical findings.

## 2019-04-11 NOTE — PHYSICAL EXAM
[Sclera] : the sclera and conjunctiva were normal [Neck Appearance] : the appearance of the neck was normal [PERRL With Normal Accommodation] : pupils were equal in size, round, and reactive to light [Respiration, Rhythm And Depth] : normal respiratory rhythm and effort [Heart Rate And Rhythm] : heart rate was normal and rhythm regular [Heart Sounds] : normal S1 and S2 [Examination Of The Chest] : the chest was normal in appearance [2+] : left 2+ [No Abnormalities] : the abdominal aorta was not enlarged and no bruit was heard [No Pulse Delay] : no pulse delay [Full Pulse] : peripheral pulses were full [No Pulse Discrepancy] : no pulse discrepancy detected [Bowel Sounds] : normal bowel sounds [Abdomen Soft] : soft [Abdomen Tenderness] : non-tender [Cervical Lymph Nodes Enlarged Posterior Bilaterally] : posterior cervical [Cervical Lymph Nodes Enlarged Anterior Bilaterally] : anterior cervical [Supraclavicular Lymph Nodes Enlarged Bilaterally] : supraclavicular [No CVA Tenderness] : no ~M costovertebral angle tenderness [Involuntary Movements] : no involuntary movements were seen [Abnormal Walk] : normal gait [Skin Color & Pigmentation] : normal skin color and pigmentation [Skin Turgor] : normal skin turgor [] : no rash [No Focal Deficits] : no focal deficits [Oriented To Time, Place, And Person] : oriented to person, place, and time [Affect] : the affect was normal [Mood] : the mood was normal [Varicose Veins Of The Right Leg] : the patient has no varicose veins of the right leg [Varicose Veins Of The Left Leg] : the patient has no varicose veins of the left leg [Fingers] :  capillary refill of the fingers was normal [FreeTextEntry1] : deferred

## 2019-04-11 NOTE — HISTORY OF PRESENT ILLNESS
[FreeTextEntry1] : 67 y/o M, current smoker, w/ hx of CAD s/p RCA drug-eluting stent placement on 3/3/17, on ASA and Plavix, and Lung CA.\par \par Now 1 yr s/p Rt VATS Robotic-assisted, wedge rxn of RUL nodule, completion RULobectomy, MLND, wedge rxn of RML for additional RUL margin on 3/28/18. Path revealed RUL AdenoCA, acinar predominant w/ additional component of micropapillary, papillary and solid areas, 2cm (total tumor size inclusive of invasive and lepidic components = 2.5cm), G2, STEPAN present, pT1c (possibly T2) N0Mx. RML (additional margin for RUL) wedge rxn was negative for malignancy.\par \par CT Chest on 2/23/19:\par - new 8 mm RML subpleural nodule (image 20, series 3)\par - post-op changes\par \par PET/CT 3/4/19:\par - 8 mm nodule in anterior aspect of RML, SUVmax=2.2\par - 7 mm nodule in anterolateral apical aspect of Rt lung, SUVmax=2.6\par - 11 mm nodule in posterior apical aspect of Rt lung, SUVmax=5.0\par - a few small areas of subsegmental atelectasis/scarring in both lungs\par - new focal SUVmax=2.8 acitivity centered on soft tissue anterior to Rt 9th rib laterally \par - 37 mm infrarenal abdominal aortic aneurysm extending into bilateral common iliac artery aneurysms\par - a 7 mm Rt hepatic lobe cysts\par \par FNA of RML on 3/28/19. Path showed atypical findings.\par \par Patient is here today for a follow up. The patient denies chest pain, hemoptysis, palpitation, fever, recent illness, hospitalization and significant weight loss. He admits SOB on exertion and cough. \par

## 2019-04-11 NOTE — ASSESSMENT
[FreeTextEntry1] : 69 y/o M, current smoker, w/ hx of CAD s/p RCA drug-eluting stent placement on 3/3/17, on ASA and Plavix, and Lung CA.\par \par Now 1 yr s/p Rt VATS Robotic-assisted, wedge rxn of RUL nodule, completion RULobectomy, MLND, wedge rxn of RML for additional RUL margin on 3/28/18. Path revealed RUL AdenoCA, acinar predominant w/ additional component of micropapillary, papillary and solid areas, 2cm (total tumor size inclusive of invasive and lepidic components = 2.5cm), G2, STEPAN present, pT1c (possibly T2) N0Mx. RML (additional margin for RUL) wedge rxn was negative for malignancy.\par \par CT Chest on 2/23/19:\par - new 8 mm RmL subpleural nodule (image 20, series 3)\par - post-op changes\par \par PET/CT 3/4/19:\par - 8 mm nodule in anterior aspect of RML, SUVmax=2.2\par - 7 mm nodule in anterolateral apical aspect of Rt lung, SUVmax=2.6\par - 11 mm nodule in posterior apical aspect of Rt lung, SUVmax=5.0\par - a few small areas of subsegmental atelectasis/scarring in both lungs\par - new focal SUVmax=2.8 acitivity centered on soft tissue anterior to Rt 9th rib laterally \par - 37 mm infrarenal abdominal aortic aneurysm extending into bilateral common iliac artery aneurysms\par - a 7 mm Rt hepatic lobe cysts\par \par FNA of RML on 3/28/19. Path showed atypical findings.\par \par I have reviewed the patient's medical records and diagnostic images at time of this office consultation and have made the following recommendation:\par Plan:\par 1. The RML 11mm nodule is highly suspicious for malignancy, recommended Re-do Right VATS, robotic assisted, possible thoracotomy, right middle lobe wedge resection, possible RML lobectomy, MLND on 5/1/19. \par All risks vs. benefits and alternatives were explained to the patient, all questions were answered, patient verbalized understanding, was in agreement with the plan to proceed.\par 2. PFT. \par 3. MRI brain w w/o contrast. \par 4. NM quantitative V/Q scan. \par 5. Augmentin 500-125 mg, take 1 tab, Q12hrs x 7 days for cough. \par 6. Cardiac and medical clearances. \par 7. PST. \par 8. Hold Plavix for 7 days prior the surgery, keep taking aspirin 81mg. \par \par \par \par \par \par Written by Helena Upton NP, acting as a scribe for Dr. Williams Ybarra.\par \par The documentation recorded by the scribe accurately reflects the service I personally performed and the decisions made by me. WILLIAMS YBARRA MD\par

## 2019-04-12 ENCOUNTER — APPOINTMENT (OUTPATIENT)
Dept: HEMATOLOGY ONCOLOGY | Facility: CLINIC | Age: 69
End: 2019-04-12

## 2019-04-15 ENCOUNTER — APPOINTMENT (OUTPATIENT)
Dept: PULMONOLOGY | Facility: CLINIC | Age: 69
End: 2019-04-15
Payer: MEDICAID

## 2019-04-15 PROCEDURE — 94729 DIFFUSING CAPACITY: CPT

## 2019-04-15 PROCEDURE — 94726 PLETHYSMOGRAPHY LUNG VOLUMES: CPT

## 2019-04-15 PROCEDURE — 94060 EVALUATION OF WHEEZING: CPT

## 2019-04-18 ENCOUNTER — FORM ENCOUNTER (OUTPATIENT)
Age: 69
End: 2019-04-18

## 2019-04-19 ENCOUNTER — APPOINTMENT (OUTPATIENT)
Dept: NUCLEAR MEDICINE | Facility: HOSPITAL | Age: 69
End: 2019-04-19
Payer: MEDICAID

## 2019-04-19 ENCOUNTER — OUTPATIENT (OUTPATIENT)
Dept: OUTPATIENT SERVICES | Facility: HOSPITAL | Age: 69
LOS: 1 days | End: 2019-04-19

## 2019-04-19 DIAGNOSIS — Z98.890 OTHER SPECIFIED POSTPROCEDURAL STATES: Chronic | ICD-10-CM

## 2019-04-19 DIAGNOSIS — Z95.5 PRESENCE OF CORONARY ANGIOPLASTY IMPLANT AND GRAFT: Chronic | ICD-10-CM

## 2019-04-19 DIAGNOSIS — C34.91 MALIGNANT NEOPLASM OF UNSPECIFIED PART OF RIGHT BRONCHUS OR LUNG: ICD-10-CM

## 2019-04-19 PROCEDURE — 78597 LUNG PERFUSION DIFFERENTIAL: CPT | Mod: 26

## 2019-04-22 ENCOUNTER — OUTPATIENT (OUTPATIENT)
Dept: OUTPATIENT SERVICES | Facility: HOSPITAL | Age: 69
LOS: 1 days | End: 2019-04-22
Payer: MEDICAID

## 2019-04-22 VITALS
OXYGEN SATURATION: 97 % | WEIGHT: 199.96 LBS | TEMPERATURE: 97 F | DIASTOLIC BLOOD PRESSURE: 80 MMHG | HEIGHT: 68 IN | RESPIRATION RATE: 14 BRPM | HEART RATE: 56 BPM | SYSTOLIC BLOOD PRESSURE: 140 MMHG

## 2019-04-22 DIAGNOSIS — Z98.890 OTHER SPECIFIED POSTPROCEDURAL STATES: Chronic | ICD-10-CM

## 2019-04-22 DIAGNOSIS — C34.91 MALIGNANT NEOPLASM OF UNSPECIFIED PART OF RIGHT BRONCHUS OR LUNG: ICD-10-CM

## 2019-04-22 DIAGNOSIS — C34.92 MALIGNANT NEOPLASM OF UNSPECIFIED PART OF LEFT BRONCHUS OR LUNG: ICD-10-CM

## 2019-04-22 DIAGNOSIS — Z95.5 PRESENCE OF CORONARY ANGIOPLASTY IMPLANT AND GRAFT: Chronic | ICD-10-CM

## 2019-04-22 LAB
ALBUMIN SERPL ELPH-MCNC: 4 G/DL — SIGNIFICANT CHANGE UP (ref 3.3–5)
ALP SERPL-CCNC: 54 U/L — SIGNIFICANT CHANGE UP (ref 40–120)
ALT FLD-CCNC: 22 U/L — SIGNIFICANT CHANGE UP (ref 4–41)
ANION GAP SERPL CALC-SCNC: 14 MMO/L — SIGNIFICANT CHANGE UP (ref 7–14)
AST SERPL-CCNC: 17 U/L — SIGNIFICANT CHANGE UP (ref 4–40)
BILIRUB SERPL-MCNC: 0.4 MG/DL — SIGNIFICANT CHANGE UP (ref 0.2–1.2)
BLD GP AB SCN SERPL QL: NEGATIVE — SIGNIFICANT CHANGE UP
BUN SERPL-MCNC: 21 MG/DL — SIGNIFICANT CHANGE UP (ref 7–23)
CALCIUM SERPL-MCNC: 9.2 MG/DL — SIGNIFICANT CHANGE UP (ref 8.4–10.5)
CHLORIDE SERPL-SCNC: 102 MMOL/L — SIGNIFICANT CHANGE UP (ref 98–107)
CO2 SERPL-SCNC: 24 MMOL/L — SIGNIFICANT CHANGE UP (ref 22–31)
CREAT SERPL-MCNC: 0.76 MG/DL — SIGNIFICANT CHANGE UP (ref 0.5–1.3)
GLUCOSE SERPL-MCNC: 123 MG/DL — HIGH (ref 70–99)
HCT VFR BLD CALC: 41.8 % — SIGNIFICANT CHANGE UP (ref 39–50)
HGB BLD-MCNC: 13.9 G/DL — SIGNIFICANT CHANGE UP (ref 13–17)
MCHC RBC-ENTMCNC: 31.6 PG — SIGNIFICANT CHANGE UP (ref 27–34)
MCHC RBC-ENTMCNC: 33.3 % — SIGNIFICANT CHANGE UP (ref 32–36)
MCV RBC AUTO: 95 FL — SIGNIFICANT CHANGE UP (ref 80–100)
NRBC # FLD: 0 K/UL — SIGNIFICANT CHANGE UP (ref 0–0)
PLATELET # BLD AUTO: 160 K/UL — SIGNIFICANT CHANGE UP (ref 150–400)
PMV BLD: 11.6 FL — SIGNIFICANT CHANGE UP (ref 7–13)
POTASSIUM SERPL-MCNC: 4.2 MMOL/L — SIGNIFICANT CHANGE UP (ref 3.5–5.3)
POTASSIUM SERPL-SCNC: 4.2 MMOL/L — SIGNIFICANT CHANGE UP (ref 3.5–5.3)
PROT SERPL-MCNC: 7 G/DL — SIGNIFICANT CHANGE UP (ref 6–8.3)
RBC # BLD: 4.4 M/UL — SIGNIFICANT CHANGE UP (ref 4.2–5.8)
RBC # FLD: 14 % — SIGNIFICANT CHANGE UP (ref 10.3–14.5)
RH IG SCN BLD-IMP: POSITIVE — SIGNIFICANT CHANGE UP
SODIUM SERPL-SCNC: 140 MMOL/L — SIGNIFICANT CHANGE UP (ref 135–145)
WBC # BLD: 6.35 K/UL — SIGNIFICANT CHANGE UP (ref 3.8–10.5)
WBC # FLD AUTO: 6.35 K/UL — SIGNIFICANT CHANGE UP (ref 3.8–10.5)

## 2019-04-22 PROCEDURE — 93010 ELECTROCARDIOGRAM REPORT: CPT

## 2019-04-22 RX ORDER — ATORVASTATIN CALCIUM 80 MG/1
1 TABLET, FILM COATED ORAL
Qty: 0 | Refills: 0 | COMMUNITY

## 2019-04-22 RX ORDER — SODIUM CHLORIDE 9 MG/ML
1000 INJECTION, SOLUTION INTRAVENOUS
Qty: 0 | Refills: 0 | Status: DISCONTINUED | OUTPATIENT
Start: 2019-05-01 | End: 2019-05-02

## 2019-04-22 RX ORDER — TAMSULOSIN HYDROCHLORIDE 0.4 MG/1
1 CAPSULE ORAL
Qty: 0 | Refills: 0 | COMMUNITY

## 2019-04-22 NOTE — H&P PST ADULT - NEGATIVE GENERAL GENITOURINARY SYMPTOMS
no flank pain L/no dysuria/no flank pain R/no bladder infections/no incontinence/no renal colic/no urine discoloration/no hematuria

## 2019-04-22 NOTE — H&P PST ADULT - NEGATIVE ENMT SYMPTOMS
no tinnitus/no nasal discharge/no recurrent cold sores/no dry mouth/no nasal congestion/no nasal obstruction/no post-nasal discharge/no nose bleeds/no abnormal taste sensation/no ear pain/no gum bleeding/no throat pain/no dysphagia/no sinus symptoms

## 2019-04-22 NOTE — H&P PST ADULT - GASTROINTESTINAL DETAILS
no distention/no masses palpable/nontender/soft/bowel sounds normal no rigidity/no guarding/no bruit/no rebound tenderness/no distention/no masses palpable/nontender/soft/no organomegaly/bowel sounds normal

## 2019-04-22 NOTE — H&P PST ADULT - NEGATIVE MUSCULOSKELETAL SYMPTOMS
no arthralgia/no leg pain L/no stiffness/no arthritis/no myalgia/no muscle cramps/no neck pain/no joint swelling/no muscle weakness/no leg pain R/no arm pain L/no arm pain R

## 2019-04-22 NOTE — H&P PST ADULT - NSICDXPASTSURGICALHX_GEN_ALL_CORE_FT
PAST SURGICAL HISTORY:  H/O knee surgery rt knee meniscus    History of ear surgery     History of lung surgery S/P Rt VATS wedge on 2018    S/P primary angioplasty with coronary stent 5/17 - [employer] [facility] Lot [id number] PAST SURGICAL HISTORY:  H/O knee surgery rt knee meniscus    History of ear surgery     History of lung biopsy 3/2019    History of lung surgery S/P Rt VATS wedge on 2018    S/P primary angioplasty with coronary stent X1 2017, X2 2009

## 2019-04-22 NOTE — H&P PST ADULT - NEGATIVE CARDIOVASCULAR SYMPTOMS
no dyspnea on exertion/no chest pain/no palpitations/no claudication/no peripheral edema/no orthopnea/no paroxysmal nocturnal dyspnea

## 2019-04-22 NOTE — H&P PST ADULT - HISTORY OF PRESENT ILLNESS
Pt is a 68 yr old male w/ PMH of lung ca S/P right Video Assisted Thoracoscopy/ Right Upper Lobe Wedge Resection wtih Dr Mejía last 3/26/18 - pt speaks Polish and translation done by daughter. Pt hx of CAD stent placed 5/17 (on Plavix), HLD, AAA that is under observation, BPH, HTN and smoking.   Presents to PST w/ a preop dx of right middle lobe mass and to be evaluated for a scheduled right middle lobe biopsy on 3/28/19.   Daughter states on 2/2019 a f/u PETscan done and a new nodule noted, seen by Dr Mejía and pt recommended to perform an IR bx (less invasive to a percutaneous needle bx). IR procedure is now scheduled w/ Dr Butler. 69y/o male scheduled for redo right video assisted thoracoscopy, robotic assisted right middle lobe wedge resection, possible thoracotomy, possible right middle lobectomy, mediastinal lymph node dissection on 5/1/2019.  Pt states, "hx lung cancer, s/p lung ca S/P right Video Assisted Thoracoscopy/ Right Upper Lobe Wedge Resection wtih Dr Mejía last 3/26/18 - pt speaks Polish and translation done by daughter. Pt hx of CAD stent placed 5/17 (on Plavix), HLD, AAA that is under observation, BPH, HTN and smoking.   Presents to PST w/ a preop dx of right middle lobe mass and to be evaluated for a scheduled right middle lobe biopsy on 3/28/19.   Daughter states on 2/2019 a f/u PETscan done and a new nodule noted, seen by Dr Mejía and pt recommended to perform an IR bx (less invasive to a percutaneous needle bx). IR procedure is now scheduled w/ Dr Butler. 67y/o male scheduled for redo right video assisted thoracoscopy, robotic assisted right middle lobe wedge resection, possible thoracotomy, possible right middle lobectomy, mediastinal lymph node dissection on 5/1/2019.  Pt states, "hx  CAD s/p stent placement X3 last 5/2017, AAA, BPH, HTN, lung cancer, s/p lung ca S/P right Video Assisted Thoracoscopy/ Right Upper Lobe Wedge Resection on 3/26/18, denies chemo and radiation  F/u pet scan showed new right lung nodule, underwent IR bx positive for malignancy."

## 2019-04-22 NOTE — H&P PST ADULT - NEGATIVE GENERAL SYMPTOMS
no fever/no weight gain/no polyphagia/no polyuria/no malaise/no polydipsia/no chills/no sweating/no anorexia/no weight loss/no fatigue

## 2019-04-22 NOTE — H&P PST ADULT - RS GEN PE MLT RESP DETAILS PC
rhonchi/airway patent/no chest wall tenderness/clear to auscultation bilaterally/no wheezes/respirations non-labored/diminished breath sounds, R/no intercostal retractions/no rales respirations non-labored/good air movement/clear to auscultation bilaterally/breath sounds equal/no rales/no chest wall tenderness/no intercostal retractions/no rhonchi/no subcutaneous emphysema/no wheezes

## 2019-04-22 NOTE — H&P PST ADULT - NSICDXPASTMEDICALHX_GEN_ALL_CORE_FT
PAST MEDICAL HISTORY:  AAA (abdominal aortic aneurysm)     BPH (benign prostatic hyperplasia)     Coronary insufficiency     HLD (hyperlipidemia)     HTN (hypertension)     Lung nodule     MI (myocardial infarction)     Smoking PAST MEDICAL HISTORY:  AAA (abdominal aortic aneurysm)     BPH (benign prostatic hyperplasia)     Coronary insufficiency     Diabetes mellitus type 2    HLD (hyperlipidemia)     HTN (hypertension)     Lung nodule     Malignant neoplasm of lung denies chemo and radiation    MI (myocardial infarction)     Smoking

## 2019-04-22 NOTE — H&P PST ADULT - NSICDXPROBLEM_GEN_ALL_CORE_FT
PROBLEM DIAGNOSES  Problem: Primary malignant neoplasm of lung, left  Assessment and Plan: Pt scheduled for redo right video assisted thoracoscopy, robotic assisted right middle lobe wedge resection, possible thoracotomy, possible right middle lobectomy, mediastinal lymph node dissection on 5/1/2019.  labs done results pending, ekg done.  Hibiclens provided.  Preop teaching done, pt able to verbalize understanding.    CAD as per cardiology note, pt to stop plavix 5 days prior to surgery, last dose 4/25/2019, instructed to continue asa, Dr. Mejía office notified. PROBLEM DIAGNOSES  Problem: Primary malignant neoplasm of lung, left  Assessment and Plan: Pt scheduled for redo right video assisted thoracoscopy, robotic assisted right middle lobe wedge resection, possible thoracotomy, possible right middle lobectomy, mediastinal lymph node dissection on 5/1/2019.  labs done results pending, ekg done.  Hibiclens provided.  Preop teaching done, pt able to verbalize understanding.    CAD as per cardiology note, pt to stop plavix 5 days prior to surgery, last dose 4/25/2019, instructed to continue asa, Dr. Mejía office notified, discussed with coordinator Janine

## 2019-05-01 ENCOUNTER — APPOINTMENT (OUTPATIENT)
Dept: THORACIC SURGERY | Facility: HOSPITAL | Age: 69
End: 2019-05-01

## 2019-05-01 ENCOUNTER — RESULT REVIEW (OUTPATIENT)
Age: 69
End: 2019-05-01

## 2019-05-01 ENCOUNTER — OUTPATIENT (OUTPATIENT)
Dept: OUTPATIENT SERVICES | Facility: HOSPITAL | Age: 69
LOS: 1 days | End: 2019-05-01
Payer: MEDICAID

## 2019-05-01 ENCOUNTER — INPATIENT (INPATIENT)
Facility: HOSPITAL | Age: 69
LOS: 5 days | Discharge: HOME | End: 2019-05-07
Attending: THORACIC SURGERY (CARDIOTHORACIC VASCULAR SURGERY) | Admitting: THORACIC SURGERY (CARDIOTHORACIC VASCULAR SURGERY)
Payer: MEDICAID

## 2019-05-01 VITALS
SYSTOLIC BLOOD PRESSURE: 134 MMHG | OXYGEN SATURATION: 97 % | HEIGHT: 68 IN | TEMPERATURE: 98 F | RESPIRATION RATE: 16 BRPM | WEIGHT: 199.96 LBS | DIASTOLIC BLOOD PRESSURE: 67 MMHG | HEART RATE: 52 BPM

## 2019-05-01 DIAGNOSIS — Z95.5 PRESENCE OF CORONARY ANGIOPLASTY IMPLANT AND GRAFT: Chronic | ICD-10-CM

## 2019-05-01 DIAGNOSIS — Z98.890 OTHER SPECIFIED POSTPROCEDURAL STATES: Chronic | ICD-10-CM

## 2019-05-01 DIAGNOSIS — C34.91 MALIGNANT NEOPLASM OF UNSPECIFIED PART OF RIGHT BRONCHUS OR LUNG: ICD-10-CM

## 2019-05-01 LAB
GRAM STN SPT: SIGNIFICANT CHANGE UP
GRAM STN WND: SIGNIFICANT CHANGE UP
SPECIMEN SOURCE: SIGNIFICANT CHANGE UP

## 2019-05-01 PROCEDURE — 71045 X-RAY EXAM CHEST 1 VIEW: CPT | Mod: 26

## 2019-05-01 PROCEDURE — 88307 TISSUE EXAM BY PATHOLOGIST: CPT | Mod: 26

## 2019-05-01 PROCEDURE — S2900 ROBOTIC SURGICAL SYSTEM: CPT | Mod: NC

## 2019-05-01 PROCEDURE — 88305 TISSUE EXAM BY PATHOLOGIST: CPT | Mod: 26

## 2019-05-01 PROCEDURE — 32652 THORACOSCOPY REM TOTL CORTEX: CPT

## 2019-05-01 PROCEDURE — 88112 CYTOPATH CELL ENHANCE TECH: CPT | Mod: 26

## 2019-05-01 PROCEDURE — 32667 THORACOSCOPY W/W RESECT ADDL: CPT

## 2019-05-01 PROCEDURE — 32674 THORACOSCOPY LYMPH NODE EXC: CPT | Mod: AS

## 2019-05-01 PROCEDURE — 32667 THORACOSCOPY W/W RESECT ADDL: CPT | Mod: AS

## 2019-05-01 PROCEDURE — 32666 THORACOSCOPY W/WEDGE RESECT: CPT | Mod: AS

## 2019-05-01 PROCEDURE — G9001: CPT

## 2019-05-01 PROCEDURE — 32666 THORACOSCOPY W/WEDGE RESECT: CPT

## 2019-05-01 PROCEDURE — 88312 SPECIAL STAINS GROUP 1: CPT | Mod: 26

## 2019-05-01 PROCEDURE — 88331 PATH CONSLTJ SURG 1 BLK 1SPC: CPT | Mod: 26

## 2019-05-01 PROCEDURE — 32652 THORACOSCOPY REM TOTL CORTEX: CPT | Mod: AS

## 2019-05-01 PROCEDURE — 99233 SBSQ HOSP IP/OBS HIGH 50: CPT

## 2019-05-01 PROCEDURE — 32674 THORACOSCOPY LYMPH NODE EXC: CPT

## 2019-05-01 PROCEDURE — 31624 DX BRONCHOSCOPE/LAVAGE: CPT

## 2019-05-01 RX ORDER — HEPARIN SODIUM 5000 [USP'U]/ML
5000 INJECTION INTRAVENOUS; SUBCUTANEOUS EVERY 8 HOURS
Qty: 0 | Refills: 0 | Status: DISCONTINUED | OUTPATIENT
Start: 2019-05-01 | End: 2019-05-07

## 2019-05-01 RX ORDER — INFLUENZA VIRUS VACCINE 15; 15; 15; 15 UG/.5ML; UG/.5ML; UG/.5ML; UG/.5ML
0.5 SUSPENSION INTRAMUSCULAR ONCE
Qty: 0 | Refills: 0 | Status: DISCONTINUED | OUTPATIENT
Start: 2019-05-01 | End: 2019-05-04

## 2019-05-01 RX ORDER — DIPHENHYDRAMINE HCL 50 MG
50 CAPSULE ORAL EVERY 4 HOURS
Qty: 0 | Refills: 0 | Status: DISCONTINUED | OUTPATIENT
Start: 2019-05-01 | End: 2019-05-02

## 2019-05-01 RX ORDER — MAGNESIUM SULFATE 500 MG/ML
2 VIAL (ML) INJECTION ONCE
Qty: 0 | Refills: 0 | Status: COMPLETED | OUTPATIENT
Start: 2019-05-01 | End: 2019-05-02

## 2019-05-01 RX ORDER — ATORVASTATIN CALCIUM 80 MG/1
20 TABLET, FILM COATED ORAL AT BEDTIME
Qty: 0 | Refills: 0 | Status: DISCONTINUED | OUTPATIENT
Start: 2019-05-01 | End: 2019-05-07

## 2019-05-01 RX ORDER — ONDANSETRON 8 MG/1
4 TABLET, FILM COATED ORAL EVERY 6 HOURS
Qty: 0 | Refills: 0 | Status: DISCONTINUED | OUTPATIENT
Start: 2019-05-01 | End: 2019-05-02

## 2019-05-01 RX ORDER — HYDROMORPHONE HYDROCHLORIDE 2 MG/ML
30 INJECTION INTRAMUSCULAR; INTRAVENOUS; SUBCUTANEOUS
Qty: 0 | Refills: 0 | Status: DISCONTINUED | OUTPATIENT
Start: 2019-05-01 | End: 2019-05-02

## 2019-05-01 RX ORDER — TAMSULOSIN HYDROCHLORIDE 0.4 MG/1
0.4 CAPSULE ORAL AT BEDTIME
Qty: 0 | Refills: 0 | Status: DISCONTINUED | OUTPATIENT
Start: 2019-05-01 | End: 2019-05-07

## 2019-05-01 RX ORDER — ACETAMINOPHEN 500 MG
1000 TABLET ORAL ONCE
Qty: 0 | Refills: 0 | Status: DISCONTINUED | OUTPATIENT
Start: 2019-05-01 | End: 2019-05-02

## 2019-05-01 RX ORDER — HEPARIN SODIUM 5000 [USP'U]/ML
5000 INJECTION INTRAVENOUS; SUBCUTANEOUS ONCE
Qty: 0 | Refills: 0 | Status: COMPLETED | OUTPATIENT
Start: 2019-05-01 | End: 2019-05-01

## 2019-05-01 RX ORDER — HYDROMORPHONE HYDROCHLORIDE 2 MG/ML
0.5 INJECTION INTRAMUSCULAR; INTRAVENOUS; SUBCUTANEOUS
Qty: 0 | Refills: 0 | Status: DISCONTINUED | OUTPATIENT
Start: 2019-05-01 | End: 2019-05-02

## 2019-05-01 RX ORDER — NALOXONE HYDROCHLORIDE 4 MG/.1ML
0.1 SPRAY NASAL
Qty: 0 | Refills: 0 | Status: DISCONTINUED | OUTPATIENT
Start: 2019-05-01 | End: 2019-05-02

## 2019-05-01 RX ADMIN — HYDROMORPHONE HYDROCHLORIDE 30 MILLILITER(S): 2 INJECTION INTRAMUSCULAR; INTRAVENOUS; SUBCUTANEOUS at 19:15

## 2019-05-01 RX ADMIN — ATORVASTATIN CALCIUM 20 MILLIGRAM(S): 80 TABLET, FILM COATED ORAL at 21:12

## 2019-05-01 RX ADMIN — TAMSULOSIN HYDROCHLORIDE 0.4 MILLIGRAM(S): 0.4 CAPSULE ORAL at 21:13

## 2019-05-01 RX ADMIN — HEPARIN SODIUM 5000 UNIT(S): 5000 INJECTION INTRAVENOUS; SUBCUTANEOUS at 21:13

## 2019-05-01 RX ADMIN — SODIUM CHLORIDE 30 MILLILITER(S): 9 INJECTION, SOLUTION INTRAVENOUS at 12:12

## 2019-05-01 RX ADMIN — HYDROMORPHONE HYDROCHLORIDE 30 MILLILITER(S): 2 INJECTION INTRAMUSCULAR; INTRAVENOUS; SUBCUTANEOUS at 17:16

## 2019-05-01 RX ADMIN — HEPARIN SODIUM 5000 UNIT(S): 5000 INJECTION INTRAVENOUS; SUBCUTANEOUS at 12:12

## 2019-05-01 NOTE — ASU PREOP CHECKLIST - COMMENTS
Pt took Pepcid with sip water at  AM Pt took Pepcid / Bisoprolol / Amlodipine/ metoprolol / Iso bride / Atorvastatin with sip water at  AM

## 2019-05-01 NOTE — ASU PREOP CHECKLIST - SELECT TESTS ORDERED
POCT Blood Glucose/CMP/EKG/Type and Screen/CBC CBC/CMP/EKG/Type and Screen/POCT Blood Glucose/135 @11 : 30AM

## 2019-05-01 NOTE — PATIENT PROFILE ADULT - NSPROPTRIGHTNOTIFY_GEN_A_NUR
Bariatric Manual    You were provided a manual specific to the procedure that you have chosen.  Please refer to that with any questions or call the office at [phone]    
declines

## 2019-05-01 NOTE — PROGRESS NOTE ADULT - SUBJECTIVE AND OBJECTIVE BOX
OLSZEWSKI, WALDEMAR                     [id number]    HPI:  67y/o male scheduled for redo right video assisted thoracoscopy, robotic assisted right middle lobe wedge resection, possible thoracotomy, possible right middle lobectomy, mediastinal lymph node dissection on 5/1/2019.  Pt states, "hx  CAD s/p stent placement X3 last 5/2017, AAA, BPH, HTN, lung cancer, s/p lung ca S/P right Video Assisted Thoracoscopy/ Right Upper Lobe Wedge Resection on 3/26/18, denies chemo and radiation  F/u pet scan showed new right lung nodule, underwent IR bx positive for malignancy." (22 Apr 2019 10:36)      Procedure: Wedge resection, lung, robot-assisted, using VATS 01-May-2019       Issues:  Lung nodule  AAA (abdominal aortic aneurysm)  MI (myocardial infarction)  Coronary insufficiency  HLD (hyperlipidemia)  HTN (hypertension)  Post op pain        PAST MEDICAL & SURGICAL HISTORY:  Diabetes mellitus: type 2  Malignant neoplasm of lung: denies chemo and radiation  BPH (benign prostatic hyperplasia)  Smoking  Lung nodule  AAA (abdominal aortic aneurysm)  MI (myocardial infarction)  Coronary insufficiency  HLD (hyperlipidemia)  HTN (hypertension)  History of lung biopsy: 3/2019  H/O knee surgery: rt knee meniscus  History of lung surgery: S/P Rt VATS wedge on 2018  History of ear surgery  S/P primary angioplasty with coronary stent: X1 2017, X2 2009            VITAL SIGNS:  Vital Signs Last 24 Hrs  T(C): 36.4 (01 May 2019 17:10), Max: 36.9 (01 May 2019 11:21)  T(F): 97.5 (01 May 2019 17:10), Max: 98.4 (01 May 2019 11:21)  HR: 52 (01 May 2019 18:30) (48 - 58)  BP: 145/67 (01 May 2019 17:40) (131/90 - 145/67)  BP(mean): 88 (01 May 2019 17:40) (88 - 100)  RR: 17 (01 May 2019 18:30) (5 - 20)  SpO2: 96% (01 May 2019 18:30) (95% - 99%)    I/Os:   I&O's Detail    01 May 2019 07:01  -  01 May 2019 19:08  --------------------------------------------------------  IN:    lactated ringers.: 90 mL  Total IN: 90 mL    OUT:  Total OUT: 0 mL    Total NET: 90 mL          CAPILLARY BLOOD GLUCOSE      POCT Blood Glucose.: 135 mg/dL (01 May 2019 11:30)      =======================MEDICATIONS===================  MEDICATIONS  (STANDING):  atorvastatin 20 milliGRAM(s) Oral at bedtime  heparin  Injectable 5000 Unit(s) SubCutaneous every 8 hours  HYDROmorphone PCA (1 mG/mL) 30 milliLiter(s) PCA Continuous PCA Continuous  influenza   Vaccine 0.5 milliLiter(s) IntraMuscular once  lactated ringers. 1000 milliLiter(s) (30 mL/Hr) IV Continuous <Continuous>  tamsulosin 0.4 milliGRAM(s) Oral at bedtime    MEDICATIONS  (PRN):  diphenhydrAMINE   Injectable 50 milliGRAM(s) IV Push every 4 hours PRN Pruritus  HYDROmorphone PCA (1 mG/mL) Rescue Clinician Bolus 0.5 milliGRAM(s) IV Push every 15 minutes PRN for Pain Scale GREATER THAN 6  naloxone Injectable 0.1 milliGRAM(s) IV Push every 3 minutes PRN For ANY of the following changes in patient status:  A. RR LESS THAN 10 breaths per minute, B. Oxygen saturation LESS THAN 90%, C. Sedation score of 6  ondansetron Injectable 4 milliGRAM(s) IV Push every 6 hours PRN Nausea          PHYSICAL EXAM============================  General:                         Awake, alert, not in any distress  Neuro:                            Moving all extremities to commands.   Respiratory:	Air entry fair and  bilateral conducted sounds                                           Effort even and unlabored.  CV:		Regular rate and rhythm. Normal S1/S2                                          Distal pulses present.  Abdomen:	                     Soft, non-distended. Bowel sounds present   Skin:		No rash.  Extremities:	Warm, no cyanosis or edema.  Palpable pulses        =============================NEUROLOGY============================  Pain control with PCA /  Tylenol IV     ==============================RESPIRATORY========================  Pt is on  2  L nasal canula   Comfortable, not in any distress.  Using incentive spirometry   Monitor chest tube output  Chest tube to suction	  Continue bronchodilators, pulmonary toilet    ============================CARDIOVASCULAR======================  Continue hemodynamic monitoring.  Not on any pressors    =====================RENAL===================  Continue LR 30CC/hr    Monitor I/Os and electrolytes    ====================GASTROINTESTINAL===================  On clears, tolerating  Continue GI prophylaxis with  Protonix  Continue Zofran / Reglan for nausea - PRN	    ========================HEMATOLOGIC/ONCOLOGIC====================  Monitor chest tube output. No signs of active bleeding.   Follow CBC in AM    ============================INFECTIOUS DISEASE========================  Monitor for fever / leukocytosis.  All surgical incision / chest tube  sites look clean      Pt is on GI & DVT prophylaxis  OOB & ambulate       Pertinent clinical, laboratory, radiographic, hemodynamic, echocardiographic, respiratory data, microbiologic data and chart were reviewed and analyzed frequently throughout the course of the day and night  Patient seen, examined and plan discussed with CT Surgery / CTICU team during rounds.    Pt's status discussed with family at bedside, updated status        Qiuping Saldivar DO FACEP

## 2019-05-02 DIAGNOSIS — Z71.89 OTHER SPECIFIED COUNSELING: ICD-10-CM

## 2019-05-02 PROBLEM — E11.9 TYPE 2 DIABETES MELLITUS WITHOUT COMPLICATIONS: Chronic | Status: ACTIVE | Noted: 2019-04-22

## 2019-05-02 LAB
ANION GAP SERPL CALC-SCNC: 14 MMO/L — SIGNIFICANT CHANGE UP (ref 7–14)
BASOPHILS # BLD AUTO: 0.01 K/UL — SIGNIFICANT CHANGE UP (ref 0–0.2)
BASOPHILS NFR BLD AUTO: 0.1 % — SIGNIFICANT CHANGE UP (ref 0–2)
BUN SERPL-MCNC: 17 MG/DL — SIGNIFICANT CHANGE UP (ref 7–23)
CALCIUM SERPL-MCNC: 8.5 MG/DL — SIGNIFICANT CHANGE UP (ref 8.4–10.5)
CHLORIDE SERPL-SCNC: 100 MMOL/L — SIGNIFICANT CHANGE UP (ref 98–107)
CO2 SERPL-SCNC: 22 MMOL/L — SIGNIFICANT CHANGE UP (ref 22–31)
CREAT SERPL-MCNC: 0.69 MG/DL — SIGNIFICANT CHANGE UP (ref 0.5–1.3)
CULTURE - ACID FAST SMEAR CONCENTRATED: SIGNIFICANT CHANGE UP
EOSINOPHIL # BLD AUTO: 0 K/UL — SIGNIFICANT CHANGE UP (ref 0–0.5)
EOSINOPHIL NFR BLD AUTO: 0 % — SIGNIFICANT CHANGE UP (ref 0–6)
GLUCOSE SERPL-MCNC: 243 MG/DL — HIGH (ref 70–99)
HBA1C BLD-MCNC: 7.6 % — HIGH (ref 4–5.6)
HCT VFR BLD CALC: 39.4 % — SIGNIFICANT CHANGE UP (ref 39–50)
HGB BLD-MCNC: 12.8 G/DL — LOW (ref 13–17)
IMM GRANULOCYTES NFR BLD AUTO: 0.4 % — SIGNIFICANT CHANGE UP (ref 0–1.5)
LYMPHOCYTES # BLD AUTO: 0.59 K/UL — LOW (ref 1–3.3)
LYMPHOCYTES # BLD AUTO: 8.2 % — LOW (ref 13–44)
MCHC RBC-ENTMCNC: 30.9 PG — SIGNIFICANT CHANGE UP (ref 27–34)
MCHC RBC-ENTMCNC: 32.5 % — SIGNIFICANT CHANGE UP (ref 32–36)
MCV RBC AUTO: 95.2 FL — SIGNIFICANT CHANGE UP (ref 80–100)
MONOCYTES # BLD AUTO: 0.07 K/UL — SIGNIFICANT CHANGE UP (ref 0–0.9)
MONOCYTES NFR BLD AUTO: 1 % — LOW (ref 2–14)
NEUTROPHILS # BLD AUTO: 6.51 K/UL — SIGNIFICANT CHANGE UP (ref 1.8–7.4)
NEUTROPHILS NFR BLD AUTO: 90.3 % — HIGH (ref 43–77)
NRBC # FLD: 0 K/UL — SIGNIFICANT CHANGE UP (ref 0–0)
PLATELET # BLD AUTO: 142 K/UL — LOW (ref 150–400)
PMV BLD: 10.6 FL — SIGNIFICANT CHANGE UP (ref 7–13)
POTASSIUM SERPL-MCNC: 4.3 MMOL/L — SIGNIFICANT CHANGE UP (ref 3.5–5.3)
POTASSIUM SERPL-SCNC: 4.3 MMOL/L — SIGNIFICANT CHANGE UP (ref 3.5–5.3)
RBC # BLD: 4.14 M/UL — LOW (ref 4.2–5.8)
RBC # FLD: 13.9 % — SIGNIFICANT CHANGE UP (ref 10.3–14.5)
SODIUM SERPL-SCNC: 136 MMOL/L — SIGNIFICANT CHANGE UP (ref 135–145)
SPECIMEN SOURCE: SIGNIFICANT CHANGE UP
WBC # BLD: 7.21 K/UL — SIGNIFICANT CHANGE UP (ref 3.8–10.5)
WBC # FLD AUTO: 7.21 K/UL — SIGNIFICANT CHANGE UP (ref 3.8–10.5)

## 2019-05-02 PROCEDURE — 71045 X-RAY EXAM CHEST 1 VIEW: CPT | Mod: 26

## 2019-05-02 PROCEDURE — 99233 SBSQ HOSP IP/OBS HIGH 50: CPT

## 2019-05-02 PROCEDURE — 99223 1ST HOSP IP/OBS HIGH 75: CPT

## 2019-05-02 RX ORDER — OXYCODONE HYDROCHLORIDE 5 MG/1
5 TABLET ORAL
Qty: 0 | Refills: 0 | Status: DISCONTINUED | OUTPATIENT
Start: 2019-05-02 | End: 2019-05-07

## 2019-05-02 RX ORDER — GLUCAGON INJECTION, SOLUTION 0.5 MG/.1ML
1 INJECTION, SOLUTION SUBCUTANEOUS ONCE
Qty: 0 | Refills: 0 | Status: DISCONTINUED | OUTPATIENT
Start: 2019-05-02 | End: 2019-05-07

## 2019-05-02 RX ORDER — DEXTROSE 50 % IN WATER 50 %
25 SYRINGE (ML) INTRAVENOUS ONCE
Qty: 0 | Refills: 0 | Status: DISCONTINUED | OUTPATIENT
Start: 2019-05-02 | End: 2019-05-07

## 2019-05-02 RX ORDER — SODIUM CHLORIDE 9 MG/ML
3 INJECTION INTRAMUSCULAR; INTRAVENOUS; SUBCUTANEOUS EVERY 8 HOURS
Qty: 0 | Refills: 0 | Status: DISCONTINUED | OUTPATIENT
Start: 2019-05-02 | End: 2019-05-03

## 2019-05-02 RX ORDER — INSULIN LISPRO 100/ML
VIAL (ML) SUBCUTANEOUS
Qty: 0 | Refills: 0 | Status: DISCONTINUED | OUTPATIENT
Start: 2019-05-02 | End: 2019-05-07

## 2019-05-02 RX ORDER — DEXTROSE 50 % IN WATER 50 %
15 SYRINGE (ML) INTRAVENOUS ONCE
Qty: 0 | Refills: 0 | Status: DISCONTINUED | OUTPATIENT
Start: 2019-05-02 | End: 2019-05-07

## 2019-05-02 RX ORDER — ACETAMINOPHEN 500 MG
650 TABLET ORAL EVERY 6 HOURS
Qty: 0 | Refills: 0 | Status: COMPLETED | OUTPATIENT
Start: 2019-05-02 | End: 2019-05-04

## 2019-05-02 RX ORDER — DEXTROSE 50 % IN WATER 50 %
12.5 SYRINGE (ML) INTRAVENOUS ONCE
Qty: 0 | Refills: 0 | Status: DISCONTINUED | OUTPATIENT
Start: 2019-05-02 | End: 2019-05-07

## 2019-05-02 RX ORDER — OXYCODONE HYDROCHLORIDE 5 MG/1
10 TABLET ORAL
Qty: 0 | Refills: 0 | Status: DISCONTINUED | OUTPATIENT
Start: 2019-05-02 | End: 2019-05-07

## 2019-05-02 RX ORDER — INSULIN LISPRO 100/ML
VIAL (ML) SUBCUTANEOUS AT BEDTIME
Qty: 0 | Refills: 0 | Status: DISCONTINUED | OUTPATIENT
Start: 2019-05-02 | End: 2019-05-07

## 2019-05-02 RX ORDER — SODIUM CHLORIDE 9 MG/ML
1000 INJECTION, SOLUTION INTRAVENOUS
Qty: 0 | Refills: 0 | Status: DISCONTINUED | OUTPATIENT
Start: 2019-05-02 | End: 2019-05-07

## 2019-05-02 RX ADMIN — SODIUM CHLORIDE 30 MILLILITER(S): 9 INJECTION, SOLUTION INTRAVENOUS at 00:42

## 2019-05-02 RX ADMIN — Medication 50 GRAM(S): at 21:00

## 2019-05-02 RX ADMIN — HEPARIN SODIUM 5000 UNIT(S): 5000 INJECTION INTRAVENOUS; SUBCUTANEOUS at 06:06

## 2019-05-02 RX ADMIN — SODIUM CHLORIDE 3 MILLILITER(S): 9 INJECTION INTRAMUSCULAR; INTRAVENOUS; SUBCUTANEOUS at 10:25

## 2019-05-02 RX ADMIN — TAMSULOSIN HYDROCHLORIDE 0.4 MILLIGRAM(S): 0.4 CAPSULE ORAL at 22:16

## 2019-05-02 RX ADMIN — Medication 650 MILLIGRAM(S): at 12:23

## 2019-05-02 RX ADMIN — HEPARIN SODIUM 5000 UNIT(S): 5000 INJECTION INTRAVENOUS; SUBCUTANEOUS at 13:31

## 2019-05-02 RX ADMIN — SODIUM CHLORIDE 30 MILLILITER(S): 9 INJECTION, SOLUTION INTRAVENOUS at 07:27

## 2019-05-02 RX ADMIN — Medication 650 MILLIGRAM(S): at 11:44

## 2019-05-02 RX ADMIN — SODIUM CHLORIDE 3 MILLILITER(S): 9 INJECTION INTRAMUSCULAR; INTRAVENOUS; SUBCUTANEOUS at 02:00

## 2019-05-02 RX ADMIN — HYDROMORPHONE HYDROCHLORIDE 30 MILLILITER(S): 2 INJECTION INTRAMUSCULAR; INTRAVENOUS; SUBCUTANEOUS at 07:27

## 2019-05-02 RX ADMIN — HEPARIN SODIUM 5000 UNIT(S): 5000 INJECTION INTRAVENOUS; SUBCUTANEOUS at 22:16

## 2019-05-02 RX ADMIN — Medication 650 MILLIGRAM(S): at 17:06

## 2019-05-02 RX ADMIN — ATORVASTATIN CALCIUM 20 MILLIGRAM(S): 80 TABLET, FILM COATED ORAL at 22:16

## 2019-05-02 RX ADMIN — Medication 2: at 18:15

## 2019-05-02 RX ADMIN — Medication 650 MILLIGRAM(S): at 18:03

## 2019-05-02 NOTE — PHYSICAL THERAPY INITIAL EVALUATION ADULT - PATIENT PROFILE REVIEW, REHAB EVAL
yes/ACTIVITY: out of bed/ambulate as tolerated; consult with Olga Dennis RN --> pt. OK for PT as tolerated

## 2019-05-02 NOTE — PHYSICAL THERAPY INITIAL EVALUATION ADULT - PERTINENT HX OF CURRENT PROBLEM, REHAB EVAL
Pt. is a 70 y/o Polish-speaking male admitted to [facility] on 05/01/19 with a dx of malignant neoplasm of Right bronchus or lung.  PT consult request secondary to ortho trauma/surgery --> s/p re-do of Right VATS, Right middle lobe resection, mediastinal lymph node dissection, and wedge resection.  H/O HTN. Pt. is a 70 y/o Polish-speaking male (but can communicate in/understand English) admitted to [facility] on 05/01/19 with a dx of malignant neoplasm of Right bronchus or lung.  PT consult request secondary to ortho trauma/surgery --> s/p re-do of Right VATS, Right middle lobe resection, mediastinal lymph node dissection, and wedge resection.  H/O HTN.

## 2019-05-02 NOTE — PROGRESS NOTE ADULT - SUBJECTIVE AND OBJECTIVE BOX
Anesthesia Pain Management Service    SUBJECTIVE: Pt now off IV PCA without problems reported.    Therapy:	  [ X] IV PCA	   [ ] Epidural           [ ] s/p Spinal Opoid              [ ] Postpartum infusion	  [ ] Patient controlled regional anesthesia (PCRA)    [ ] prn Analgesics    Allergies    No Known Allergies    Intolerances      MEDICATIONS  (STANDING):  acetaminophen   Tablet .. 650 milliGRAM(s) Oral every 6 hours  atorvastatin 20 milliGRAM(s) Oral at bedtime  dextrose 5%. 1000 milliLiter(s) (50 mL/Hr) IV Continuous <Continuous>  dextrose 50% Injectable 12.5 Gram(s) IV Push once  dextrose 50% Injectable 25 Gram(s) IV Push once  dextrose 50% Injectable 25 Gram(s) IV Push once  heparin  Injectable 5000 Unit(s) SubCutaneous every 8 hours  influenza   Vaccine 0.5 milliLiter(s) IntraMuscular once  insulin lispro (HumaLOG) corrective regimen sliding scale   SubCutaneous three times a day before meals  insulin lispro (HumaLOG) corrective regimen sliding scale   SubCutaneous at bedtime  sodium chloride 3%  Inhalation 3 milliLiter(s) Inhalation every 8 hours  tamsulosin 0.4 milliGRAM(s) Oral at bedtime    MEDICATIONS  (PRN):  dextrose 40% Gel 15 Gram(s) Oral once PRN Blood Glucose LESS THAN 70 milliGRAM(s)/deciliter  glucagon  Injectable 1 milliGRAM(s) IntraMuscular once PRN Glucose LESS THAN 70 milligrams/deciliter  oxyCODONE    IR 5 milliGRAM(s) Oral every 3 hours PRN Moderate Pain (4 - 6)  oxyCODONE    IR 10 milliGRAM(s) Oral every 3 hours PRN Severe Pain (7 - 10)      OBJECTIVE:   [X] No new signs     [ ] Other:    Side Effects:  [X ] None			[ ] Other:    Assessment of Catheter Site:		[ ] Intact		[ ] Other:    ASSESSMENT/PLAN  [ ] Continue current therapy    [X ] Therapy changed to:    [ ] IV PCA       [ ] Epidural     [ X] prn Analgesics     Comments: PRN Oral/IV opioids and/or non-opioid adjuvant analgesics to be used at this point.    Progress Note written now but Patient was seen earlier.

## 2019-05-02 NOTE — PROGRESS NOTE ADULT - SUBJECTIVE AND OBJECTIVE BOX
OLSZEWSKI, WALDEMAR                     [id number]    HPI:  67y/o male scheduled for redo right video assisted thoracoscopy, robotic assisted right middle lobe wedge resection, possible thoracotomy, possible right middle lobectomy, mediastinal lymph node dissection on 5/1/2019.  Pt states, "hx  CAD s/p stent placement X3 last 5/2017, AAA, BPH, HTN, lung cancer, s/p lung ca S/P right Video Assisted Thoracoscopy/ Right Upper Lobe Wedge Resection on 3/26/18, denies chemo and radiation  F/u pet scan showed new right lung nodule, underwent IR bx positive for malignancy." (22 Apr 2019 10:36)      Procedure: Wedge resection, lung, robot-assisted, using VATS 01-May-2019       Issues:  Lung nodule  AAA (abdominal aortic aneurysm)  MI (myocardial infarction)  Coronary insufficiency  HLD (hyperlipidemia)  HTN (hypertension)  Post op pain          PAST MEDICAL & SURGICAL HISTORY:  Diabetes mellitus: type 2  Malignant neoplasm of lung: denies chemo and radiation  BPH (benign prostatic hyperplasia)  Smoking  Lung nodule  AAA (abdominal aortic aneurysm)  MI (myocardial infarction)  Coronary insufficiency  HLD (hyperlipidemia)  HTN (hypertension)  History of lung biopsy: 3/2019  H/O knee surgery: rt knee meniscus  History of lung surgery: S/P Rt VATS wedge on 2018  History of ear surgery  S/P primary angioplasty with coronary stent: X1 2017, X2 2009            VITAL SIGNS:  Vital Signs Last 24 Hrs  T(C): 36.7 (02 May 2019 04:00), Max: 36.9 (01 May 2019 11:21)  T(F): 98 (02 May 2019 04:00), Max: 98.4 (01 May 2019 11:21)  HR: 54 (02 May 2019 05:00) (48 - 63)  BP: 145/67 (01 May 2019 17:40) (131/90 - 145/67)  BP(mean): 88 (01 May 2019 17:40) (88 - 100)  RR: 12 (02 May 2019 05:00) (5 - 20)  SpO2: 98% (02 May 2019 05:00) (95% - 99%)    I/Os:   I&O's Detail    01 May 2019 07:01  -  02 May 2019 06:12  --------------------------------------------------------  IN:    IV PiggyBack: 50 mL    lactated ringers.: 450 mL    Oral Fluid: 240 mL  Total IN: 740 mL    OUT:    Chest Tube: 40 mL    Voided: 925 mL  Total OUT: 965 mL    Total NET: -225 mL          CAPILLARY BLOOD GLUCOSE      POCT Blood Glucose.: 135 mg/dL (01 May 2019 11:30)      =======================MEDICATIONS===================  MEDICATIONS  (STANDING):  acetaminophen  IVPB .. 1000 milliGRAM(s) IV Intermittent once  atorvastatin 20 milliGRAM(s) Oral at bedtime  heparin  Injectable 5000 Unit(s) SubCutaneous every 8 hours  HYDROmorphone PCA (1 mG/mL) 30 milliLiter(s) PCA Continuous PCA Continuous  influenza   Vaccine 0.5 milliLiter(s) IntraMuscular once  lactated ringers. 1000 milliLiter(s) (30 mL/Hr) IV Continuous <Continuous>  sodium chloride 3%  Inhalation 3 milliLiter(s) Inhalation every 8 hours  tamsulosin 0.4 milliGRAM(s) Oral at bedtime    MEDICATIONS  (PRN):  diphenhydrAMINE   Injectable 50 milliGRAM(s) IV Push every 4 hours PRN Pruritus  HYDROmorphone PCA (1 mG/mL) Rescue Clinician Bolus 0.5 milliGRAM(s) IV Push every 15 minutes PRN for Pain Scale GREATER THAN 6  naloxone Injectable 0.1 milliGRAM(s) IV Push every 3 minutes PRN For ANY of the following changes in patient status:  A. RR LESS THAN 10 breaths per minute, B. Oxygen saturation LESS THAN 90%, C. Sedation score of 6  ondansetron Injectable 4 milliGRAM(s) IV Push every 6 hours PRN Nausea        PHYSICAL EXAM============================  General:                         Awake, alert, not in any distress  Neuro:                            Moving all extremities to commands.   Respiratory:	Air entry fair and  bilateral conducted sounds                                           Effort even and unlabored.  CV:		Regular rate and rhythm. Normal S1/S2                                          Distal pulses present.  Abdomen:	                     Soft, non-distended. Bowel sounds present   Skin:		No rash.  Extremities:	Warm, no cyanosis or edema.  Palpable pulses    ============================LABS=========================                        12.8   7.21  )-----------( 142      ( 02 May 2019 02:40 )             39.4     05-02    136  |  100  |  17  ----------------------------<  243<H>  4.3   |  22  |  0.69    Ca    8.5      02 May 2019 02:40            =============================NEUROLOGY============================  Pain control with PCA /  Tylenol IV     ==============================RESPIRATORY========================  Pt is on 2  L nasal canula   Comfortable, not in any distress.  Using incentive spirometry   Monitor chest tube output  Chest tube to suction	  Continue bronchodilators, pulmonary toilet    ============================CARDIOVASCULAR======================  Continue hemodynamic monitoring.  Not on any pressors    =====================RENAL===================  Continue LR 30CC/hr    Monitor I/Os and electrolytes    ====================GASTROINTESTINAL===================  On regular.  tolerating  Continue GI prophylaxis with Pepcid / Protonix  Continue Zofran / Reglan for nausea - PRN	    ========================HEMATOLOGIC/ONCOLOGIC====================  Monitor chest tube output. No signs of active bleeding.   Follow CBC in AM    ============================INFECTIOUS DISEASE========================  Monitor for fever / leukocytosis.  All surgical incision / chest tube  sites look clean      Pt is on GI & DVT prophylaxis  OOB & ambulate       Pertinent clinical, laboratory, radiographic, hemodynamic, echocardiographic, respiratory data, microbiologic data and chart were reviewed and analyzed frequently throughout the course of the day and night  Patient seen, examined and plan discussed with CT Surgery / CTICU team during rounds.    Pt's status discussed with family at bedside, updated status        Nghia Saldivar DO FACEP

## 2019-05-02 NOTE — PHYSICAL THERAPY INITIAL EVALUATION ADULT - DID THE PATIENT HAVE SURGERY?
yes/s/p re-do of Right VATS, Right middle lobe resection, mediastinal lymph node dissection, and wedge resection

## 2019-05-02 NOTE — PHYSICAL THERAPY INITIAL EVALUATION ADULT - PLANNED THERAPY INTERVENTIONS, PT EVAL
stair training PRN/transfer training/balance training/bed mobility training/strengthening/gait training

## 2019-05-02 NOTE — PHYSICAL THERAPY INITIAL EVALUATION ADULT - ADDITIONAL COMMENTS
Pt. left in recliner post-PT in NAD with all lines/tubes intact & table/call bell within reach.  SILVINO mays.

## 2019-05-02 NOTE — PHYSICAL THERAPY INITIAL EVALUATION ADULT - DISCHARGE DISPOSITION, PT EVAL
Pt request refill 06/22/2017 last office visit. Health Maintenance   Topic Date Due    Pneumococcal med risk (1 of 1 - PPSV23) 01/25/2010    Cervical cancer screen  04/22/2016    Flu vaccine (1) 09/01/2017    DTaP/Tdap/Td vaccine (2 - Td) 01/01/2023    HIV screen  Completed             (applicable per patient's age: Cancer Screenings, Depression Screening, Fall Risk Screening, Immunizations)    BUN (mg/dL)   Date Value   12/03/2012 6      (goal A1C is < 7)   (goal LDL is <100) need 30-50% reduction from baseline     BP Readings from Last 3 Encounters:   06/22/17 120/80   05/18/17 124/82   02/22/17 110/76    (goal /80)      All Future Testing planned in [facility]:  Lab Frequency Next Occurrence   CBC With Manual Differential Once 05/25/2017   Iron And TIBC Once 05/25/2017   US Spleen Once 05/25/2017   TSH without Reflex Once 06/26/2017   T4, Free Once 06/22/2017   T4, Free Once 09/15/2017   TSH without Reflex Once 10/25/2017       Next Visit Date:  No future appointments.          Patient Active Problem List:     Anemia, iron deficiency     Beta thalassemia trait     Hypothyroidism     Chronic back pain     Tobacco abuse Pt. will benefit from skilled PT while inpatient at [facility]/home/no skilled PT needs

## 2019-05-02 NOTE — PROGRESS NOTE ADULT - SUBJECTIVE AND OBJECTIVE BOX
ANESTHESIA POSTOP CHECK    69y Male POSTOP DAY 1 S/P     Vital Signs Last 24 Hrs  T(C): 36.9 (02 May 2019 17:03), Max: 37.2 (02 May 2019 08:00)  T(F): 98.4 (02 May 2019 17:03), Max: 98.9 (02 May 2019 08:00)  HR: 65 (02 May 2019 17:03) (52 - 69)  BP: 118/65 (02 May 2019 17:03) (108/51 - 135/66)  BP(mean): 66 (02 May 2019 11:00) (64 - 82)  RR: 18 (02 May 2019 17:03) (12 - 20)  SpO2: 94% (02 May 2019 17:03) (91% - 99%)  I&O's Summary    01 May 2019 07:01  -  02 May 2019 07:00  --------------------------------------------------------  IN: 740 mL / OUT: 965 mL / NET: -225 mL    02 May 2019 07:01  -  02 May 2019 18:25  --------------------------------------------------------  IN: 840 mL / OUT: 1159 mL / NET: -319 mL        [X ] NO APPARENT ANESTHESIA COMPLICATIONS      Comments:

## 2019-05-02 NOTE — PHYSICAL THERAPY INITIAL EVALUATION ADULT - CRITERIA FOR SKILLED THERAPEUTIC INTERVENTIONS
impairments found/predicted duration of therapy intervention/therapy frequency/Home --> no skilled PT needs for discharge/anticipated discharge recommendation

## 2019-05-02 NOTE — CONSULT NOTE ADULT - SUBJECTIVE AND OBJECTIVE BOX
Info from chart and     CHIEF COMPLAINT: Post op lung surgery; granuloma    HPI:69 yo male with VATS resection of lung lesion (post abnormal PET) ; granuloma on pathology. Pt has a chronic cough but denies fever, night sweats or weight loss.  VATS resection of RUL last year; adenoca without post op tx    PAST MEDICAL & SURGICAL HISTORY:  Diabetes mellitus: type 2  Malignant neoplasm of lung: denies chemo and radiation  BPH (benign prostatic hyperplasia)  Smoking  Lung nodule  AAA (abdominal aortic aneurysm)  MI (myocardial infarction)  Coronary insufficiency  HLD (hyperlipidemia)  HTN (hypertension)  History of lung biopsy: 3/2019  H/O knee surgery: rt knee meniscus  History of lung surgery: S/P Rt VATS wedge on 2018  History of ear surgery  S/P primary angioplasty with coronary stent: X1 2017, X2 2009      FAMILY HISTORY:  Family history of coronary artery disease (Sibling)    Former smoker; quit last year    No Known Allergies    T(C): 37.2 (02 May 2019 08:00), Max: 37.2 (02 May 2019 08:00)  T(F): 98.9 (02 May 2019 08:00), Max: 98.9 (02 May 2019 08:00)  HR: 54 (02 May 2019 08:00) (48 - 63)  BP: 119/60 (02 May 2019 08:00) (119/60 - 145/67)  BP(mean): 73 (02 May 2019 08:00) (73 - 100)  ABP: 125/58 (02 May 2019 05:00) (123/57 - 157/76)  ABP(mean): 82 (02 May 2019 05:00) (81 - 107)  RR: 19 (02 May 2019 08:00) (5 - 20)  SpO2: 97% (02 May 2019 08:00) (95% - 99%)        05-01 @ 07:01  -  05-02 @ 07:00  --------------------------------------------------------  IN: 740 mL / OUT: 965 mL / NET: -225 mL      PHYSICAL EXAM:  General: NAD in chair  Chest tube in place with mild air leak  Neck: Supple   no nodes  Respiratory: Scattered rhonchi bilat  Cardiovascular: Reg  Abdomen: soft  non tender  Extremities: no edema  no clubbing    HOSPITAL MEDICATIONS:  MEDICATIONS  (STANDING):  acetaminophen  IVPB .. 1000 milliGRAM(s) IV Intermittent once  atorvastatin 20 milliGRAM(s) Oral at bedtime  heparin  Injectable 5000 Unit(s) SubCutaneous every 8 hours  HYDROmorphone PCA (1 mG/mL) 30 milliLiter(s) PCA Continuous PCA Continuous  influenza   Vaccine 0.5 milliLiter(s) IntraMuscular once  lactated ringers. 1000 milliLiter(s) (30 mL/Hr) IV Continuous <Continuous>  sodium chloride 3%  Inhalation 3 milliLiter(s) Inhalation every 8 hours  tamsulosin 0.4 milliGRAM(s) Oral at bedtime    MEDICATIONS  (PRN):  diphenhydrAMINE   Injectable 50 milliGRAM(s) IV Push every 4 hours PRN Pruritus  HYDROmorphone PCA (1 mG/mL) Rescue Clinician Bolus 0.5 milliGRAM(s) IV Push every 15 minutes PRN for Pain Scale GREATER THAN 6  naloxone Injectable 0.1 milliGRAM(s) IV Push every 3 minutes PRN For ANY of the following changes in patient status:  A. RR LESS THAN 10 breaths per minute, B. Oxygen saturation LESS THAN 90%, C. Sedation score of 6  ondansetron Injectable 4 milliGRAM(s) IV Push every 6 hours PRN Nausea      LABS:                        12.8   7.21  )-----------( 142      ( 02 May 2019 02:40 )             39.4     Hgb Trend: 12.8<--  05-02    136  |  100  |  17  ----------------------------<  243<H>  4.3   |  22  |  0.69    Ca    8.5      02 May 2019 02:40      Creatinine Trend: 0.69<--, 0.76<--      Chest xray (this AM) chest tube right chest. No pneumothorax

## 2019-05-02 NOTE — PHYSICAL THERAPY INITIAL EVALUATION ADULT - DIAGNOSIS, PT EVAL
malignant neoplasm of Right bronchus or lung; ortho trauma/surgery --> s/p re-do of Right VATS, Right middle lobe resection, mediastinal lymph node dissection, and wedge resection

## 2019-05-02 NOTE — PHYSICAL THERAPY INITIAL EVALUATION ADULT - ACTIVE RANGE OF MOTION EXAMINATION, REHAB EVAL
deficits as listed below/Bilateral UE's WFL's except no greater than 0-90 degrees shoulder flexion/extension secondary to (+) chest tube placement

## 2019-05-02 NOTE — PROGRESS NOTE ADULT - SUBJECTIVE AND OBJECTIVE BOX
Anesthesia Pain Management Service    SUBJECTIVE: [place]  used ID# [id number]. Patient is doing well with IV PCA and no significant problems reported.    Pain Scale Score	At rest: __0/10_ 	With Activity: _4/10__ 	[X ] Refer to charted pain scores    THERAPY:    [ ] IV PCA Morphine		[ ] 5 mg/mL	[ ] 1 mg/mL  [X ] IV PCA Hydromorphone	[ ] 5 mg/mL	[X ] 1 mg/mL  [ ] IV PCA Fentanyl		[ ] 50 micrograms/mL    Demand dose __0.2_ lockout __6_ (minutes) Continuous Rate _0__ Total: __0_   mg used (in past 24 hrs)      MEDICATIONS  (STANDING):  acetaminophen   Tablet .. 650 milliGRAM(s) Oral every 6 hours  atorvastatin 20 milliGRAM(s) Oral at bedtime  dextrose 5%. 1000 milliLiter(s) (50 mL/Hr) IV Continuous <Continuous>  dextrose 50% Injectable 12.5 Gram(s) IV Push once  dextrose 50% Injectable 25 Gram(s) IV Push once  dextrose 50% Injectable 25 Gram(s) IV Push once  heparin  Injectable 5000 Unit(s) SubCutaneous every 8 hours  influenza   Vaccine 0.5 milliLiter(s) IntraMuscular once  insulin lispro (HumaLOG) corrective regimen sliding scale   SubCutaneous three times a day before meals  insulin lispro (HumaLOG) corrective regimen sliding scale   SubCutaneous at bedtime  lactated ringers. 1000 milliLiter(s) (30 mL/Hr) IV Continuous <Continuous>  sodium chloride 3%  Inhalation 3 milliLiter(s) Inhalation every 8 hours  tamsulosin 0.4 milliGRAM(s) Oral at bedtime    MEDICATIONS  (PRN):  dextrose 40% Gel 15 Gram(s) Oral once PRN Blood Glucose LESS THAN 70 milliGRAM(s)/deciliter  glucagon  Injectable 1 milliGRAM(s) IntraMuscular once PRN Glucose LESS THAN 70 milligrams/deciliter  oxyCODONE    IR 5 milliGRAM(s) Oral every 3 hours PRN Moderate Pain (4 - 6)  oxyCODONE    IR 10 milliGRAM(s) Oral every 3 hours PRN Severe Pain (7 - 10)      OBJECTIVE:  Patient is sitting up in chair, comfortable, with CT.    Sedation Score:	[ X] Alert	[ ] Drowsy 	[ ] Arousable	[ ] Asleep	[ ] Unresponsive    Side Effects:	[X ] None	[ ] Nausea	[ ] Vomiting	[ ] Pruritus  		[ ] Other:    Vital Signs Last 24 Hrs  T(C): 37.2 (02 May 2019 08:00), Max: 37.2 (02 May 2019 08:00)  T(F): 98.9 (02 May 2019 08:00), Max: 98.9 (02 May 2019 08:00)  HR: 62 (02 May 2019 10:23) (48 - 68)  BP: 108/51 (02 May 2019 10:00) (108/51 - 145/67)  BP(mean): 64 (02 May 2019 10:00) (64 - 100)  RR: 20 (02 May 2019 10:00) (5 - 20)  SpO2: 99% (02 May 2019 10:23) (95% - 99%)    ASSESSMENT/ PLAN    Therapy to  be:	[ ] Continue   [ X] Discontinued   [X ] Change to prn Analgesics    Documentation and Verification of current medications:   [X] Done	[ ] Not done, not elligible    Comments: Discussed patient with CTICU intensivist, ok to discontinue PCA.  PRN Oral/IV opioids and/or Adjuvant non-opioid medication to be ordered at this point.    Progress Note written now but Patient was seen earlier.

## 2019-05-02 NOTE — PHYSICAL THERAPY INITIAL EVALUATION ADULT - PRECAUTIONS/LIMITATIONS, REHAB EVAL
surgical precautions/isolation precautions/(Airborne precautions: r/o TB), (+) chest tube, (+) cardiac monitoring

## 2019-05-03 ENCOUNTER — TRANSCRIPTION ENCOUNTER (OUTPATIENT)
Age: 69
End: 2019-05-03

## 2019-05-03 LAB
ANION GAP SERPL CALC-SCNC: 10 MMO/L — SIGNIFICANT CHANGE UP (ref 7–14)
BUN SERPL-MCNC: 22 MG/DL — SIGNIFICANT CHANGE UP (ref 7–23)
CALCIUM SERPL-MCNC: 9.4 MG/DL — SIGNIFICANT CHANGE UP (ref 8.4–10.5)
CHLORIDE SERPL-SCNC: 105 MMOL/L — SIGNIFICANT CHANGE UP (ref 98–107)
CO2 SERPL-SCNC: 27 MMOL/L — SIGNIFICANT CHANGE UP (ref 22–31)
CREAT SERPL-MCNC: 0.91 MG/DL — SIGNIFICANT CHANGE UP (ref 0.5–1.3)
CULTURE - ACID FAST SMEAR CONCENTRATED: SIGNIFICANT CHANGE UP
CULTURE - ACID FAST SMEAR CONCENTRATED: SIGNIFICANT CHANGE UP
GLUCOSE SERPL-MCNC: 186 MG/DL — HIGH (ref 70–99)
HCT VFR BLD CALC: 41.6 % — SIGNIFICANT CHANGE UP (ref 39–50)
HGB BLD-MCNC: 13.6 G/DL — SIGNIFICANT CHANGE UP (ref 13–17)
MCHC RBC-ENTMCNC: 31.2 PG — SIGNIFICANT CHANGE UP (ref 27–34)
MCHC RBC-ENTMCNC: 32.7 % — SIGNIFICANT CHANGE UP (ref 32–36)
MCV RBC AUTO: 95.4 FL — SIGNIFICANT CHANGE UP (ref 80–100)
NRBC # FLD: 0 K/UL — SIGNIFICANT CHANGE UP (ref 0–0)
PLATELET # BLD AUTO: 163 K/UL — SIGNIFICANT CHANGE UP (ref 150–400)
PMV BLD: 11.5 FL — SIGNIFICANT CHANGE UP (ref 7–13)
POTASSIUM SERPL-MCNC: 4.2 MMOL/L — SIGNIFICANT CHANGE UP (ref 3.5–5.3)
POTASSIUM SERPL-SCNC: 4.2 MMOL/L — SIGNIFICANT CHANGE UP (ref 3.5–5.3)
RBC # BLD: 4.36 M/UL — SIGNIFICANT CHANGE UP (ref 4.2–5.8)
RBC # FLD: 14.4 % — SIGNIFICANT CHANGE UP (ref 10.3–14.5)
SODIUM SERPL-SCNC: 142 MMOL/L — SIGNIFICANT CHANGE UP (ref 135–145)
SPECIMEN SOURCE: SIGNIFICANT CHANGE UP
WBC # BLD: 10.4 K/UL — SIGNIFICANT CHANGE UP (ref 3.8–10.5)
WBC # FLD AUTO: 10.4 K/UL — SIGNIFICANT CHANGE UP (ref 3.8–10.5)

## 2019-05-03 PROCEDURE — 99233 SBSQ HOSP IP/OBS HIGH 50: CPT

## 2019-05-03 PROCEDURE — 71045 X-RAY EXAM CHEST 1 VIEW: CPT | Mod: 26

## 2019-05-03 RX ORDER — AMLODIPINE BESYLATE 2.5 MG/1
5 TABLET ORAL DAILY
Qty: 0 | Refills: 0 | Status: DISCONTINUED | OUTPATIENT
Start: 2019-05-03 | End: 2019-05-03

## 2019-05-03 RX ORDER — AMLODIPINE BESYLATE 2.5 MG/1
5 TABLET ORAL DAILY
Qty: 0 | Refills: 0 | Status: DISCONTINUED | OUTPATIENT
Start: 2019-05-03 | End: 2019-05-07

## 2019-05-03 RX ADMIN — HEPARIN SODIUM 5000 UNIT(S): 5000 INJECTION INTRAVENOUS; SUBCUTANEOUS at 22:21

## 2019-05-03 RX ADMIN — Medication 4: at 12:52

## 2019-05-03 RX ADMIN — Medication 650 MILLIGRAM(S): at 12:52

## 2019-05-03 RX ADMIN — ATORVASTATIN CALCIUM 20 MILLIGRAM(S): 80 TABLET, FILM COATED ORAL at 22:21

## 2019-05-03 RX ADMIN — OXYCODONE HYDROCHLORIDE 10 MILLIGRAM(S): 5 TABLET ORAL at 22:21

## 2019-05-03 RX ADMIN — TAMSULOSIN HYDROCHLORIDE 0.4 MILLIGRAM(S): 0.4 CAPSULE ORAL at 22:21

## 2019-05-03 RX ADMIN — OXYCODONE HYDROCHLORIDE 10 MILLIGRAM(S): 5 TABLET ORAL at 23:00

## 2019-05-03 RX ADMIN — HEPARIN SODIUM 5000 UNIT(S): 5000 INJECTION INTRAVENOUS; SUBCUTANEOUS at 12:51

## 2019-05-03 RX ADMIN — Medication 650 MILLIGRAM(S): at 05:50

## 2019-05-03 RX ADMIN — HEPARIN SODIUM 5000 UNIT(S): 5000 INJECTION INTRAVENOUS; SUBCUTANEOUS at 05:02

## 2019-05-03 RX ADMIN — Medication 650 MILLIGRAM(S): at 05:02

## 2019-05-03 RX ADMIN — Medication 650 MILLIGRAM(S): at 17:51

## 2019-05-03 RX ADMIN — Medication 4: at 09:16

## 2019-05-03 RX ADMIN — Medication 650 MILLIGRAM(S): at 18:35

## 2019-05-03 NOTE — PROGRESS NOTE ADULT - SUBJECTIVE AND OBJECTIVE BOX
Comfortable OOB in chair  Chest tube off suction. No air leak noted    VS Stable Afeb    Lungs- scattered rhonchi bilat  Heart- Reg  Abd- non tender  Ext- no edema    AFB negative bronch     IMP: Comfortable post VATS resection; necrotizing granuloma  All specimens negative AFB smear thus far    PLAN: F/u sputum AFB   Resp isolation for now  F/u chest xray  F/u serum quantiferon  As per CT surgery

## 2019-05-03 NOTE — PROGRESS NOTE ADULT - SUBJECTIVE AND OBJECTIVE BOX
Subjective: 67 y/o male, polish speaking, presents sitting up in chair in NAD. States he does not have sob, cp, nvd. Chest tube in place with air leak. He is ambulating well and using ISS.   #     Vital Signs:  Vital Signs Last 24 Hrs  T(C): 36.8 (05-03-19 @ 08:08), Max: 37 (05-02-19 @ 12:22)  T(F): 98.2 (05-03-19 @ 08:08), Max: 98.6 (05-02-19 @ 12:22)  HR: 49 (05-03-19 @ 08:08) (49 - 69)  BP: 140/66 (05-03-19 @ 08:08) (108/51 - 141/66)  RR: 18 (05-03-19 @ 08:08) (18 - 20)  SpO2: 97% (05-03-19 @ 08:08) (91% - 99%) on (O2)    Pertinent Physical Exam:  Telemetry/Alarms: Sinus enio  General: WN/WD NAD  Neurology: Awake, nonfocal, RAM x 4  Eyes: Scleras clear, PERRLA/ EOMI, Gross vision intact  ENT:Gross hearing intact, grossly patent pharynx, no stridor  Neck: Neck supple, trachea midline, No JVD,   Respiratory: CTA B/L, No wheezing, rales, rhonchi  CV: RRR, S1S2, no murmurs, rubs or gallops  Abdominal: Soft, NT, ND +BS,   Extremities: No edema, + peripheral pulses  Skin: No Rashes, Hematoma, Ecchymosis  Lymphatic: No Neck, axilla, groin LAD  Psych: Oriented x 3, normal affect  Incisions: c/d/i  Tubes: Right Chest tube in place    Chest Tube: Right Side   Air Leak: Yes[x] / No[]    Drainage: 9cc    I&O's Summary    02 May 2019 07:01  -  03 May 2019 07:00  --------------------------------------------------------  IN: 840 mL / OUT: 1609 mL / NET: -769 mL        Relevant labs, radiology and Medications reviewed   CXR: < from: Xray Chest 1 View AP/PA (05.02.19 @ 06:49) >  INTERPRETATION:     May 1 at 5:39 PM:  Right-sided chest tube with its tip overlying the apex of this   hemithorax. Lungs are clear and there is no effusion or pneumothorax.    May 2 at 6:09 AM:  Right-sided chest tube in position. Lungs remain free of focal   consolidations. Heart size is stable and no complicating effusions or   pneumothorax.    < end of copied text >                           13.6   10.40 )-----------( 163      ( 03 May 2019 06:15 )             41.6     05-03    142  |  105  |  22  ----------------------------<  186<H>  4.2   |  27  |  0.91    Ca    9.4      03 May 2019 06:15        MEDICATIONS  (STANDING):  acetaminophen   Tablet .. 650 milliGRAM(s) Oral every 6 hours  amLODIPine   Tablet 5 milliGRAM(s) Oral daily  atorvastatin 20 milliGRAM(s) Oral at bedtime  dextrose 5%. 1000 milliLiter(s) (50 mL/Hr) IV Continuous <Continuous>  dextrose 50% Injectable 12.5 Gram(s) IV Push once  dextrose 50% Injectable 25 Gram(s) IV Push once  dextrose 50% Injectable 25 Gram(s) IV Push once  heparin  Injectable 5000 Unit(s) SubCutaneous every 8 hours  influenza   Vaccine 0.5 milliLiter(s) IntraMuscular once  insulin lispro (HumaLOG) corrective regimen sliding scale   SubCutaneous three times a day before meals  insulin lispro (HumaLOG) corrective regimen sliding scale   SubCutaneous at bedtime  sodium chloride 3%  Inhalation 3 milliLiter(s) Inhalation every 8 hours  tamsulosin 0.4 milliGRAM(s) Oral at bedtime    MEDICATIONS  (PRN):  dextrose 40% Gel 15 Gram(s) Oral once PRN Blood Glucose LESS THAN 70 milliGRAM(s)/deciliter  glucagon  Injectable 1 milliGRAM(s) IntraMuscular once PRN Glucose LESS THAN 70 milligrams/deciliter  oxyCODONE    IR 5 milliGRAM(s) Oral every 3 hours PRN Moderate Pain (4 - 6)  oxyCODONE    IR 10 milliGRAM(s) Oral every 3 hours PRN Severe Pain (7 - 10)      Assessment  69y Male  w/ PAST MEDICAL & SURGICAL HISTORY:  Diabetes mellitus: type 2  Malignant neoplasm of lung: denies chemo and radiation  BPH (benign prostatic hyperplasia)  Smoking  Lung nodule  AAA (abdominal aortic aneurysm)  MI (myocardial infarction)  Coronary insufficiency  HLD (hyperlipidemia)  HTN (hypertension)  History of lung biopsy: 3/2019  H/O knee surgery: rt knee meniscus  History of lung surgery: S/P Rt VATS wedge on 2018  History of ear surgery  S/P primary angioplasty with coronary stent: X1 2017, X2 2009  admitted with complaints of Patient is a 69y old  Male who presents with a chief complaint of R lung nodule (03 May 2019 05:22)    69y/o male w/ PMH of CAD s/p stent placement X3 last 5/2017, AAA, BPH, HTN, lung cancer, s/p lung ca, S/P right Video Assisted Thoracoscopy/ Right Upper Lobe Wedge Resection on 3/26/18. Now POD 2 from right vats, Wedge resection, BAL 5/1/19. Post op course c/b air leak. On isolation pending BAL results to r/o TB.           PLAN  Neuro: Pain management with oral medication   Pulm: Encourage coughing, deep breathing and use of incentive spirometry. Wean off supplemental oxygen as able. Daily CXR. Continue on isolation. F/u AFB  Cardio: Monitor telemetry/alarms  GI: Tolerating diet. Continue stool softeners.  Renal: monitor urine output, supplement electrolytes as needed  Vasc: Heparin SC/SCDs for DVT prophylaxis  Heme: Stable H/H.   ID: Off antibiotics. Stable.  Therapy: OOB/ambulate  Tubes: Monitor Chest tube output. Maintain to   Disposition: f/u sputum cx.  Aim to D/C to home once air leak resolves and chest tube is removed.  Discussed with Cardiothoracic Team at AM rounds.

## 2019-05-03 NOTE — DISCHARGE NOTE PROVIDER - CARE PROVIDERS DIRECT ADDRESSES
[email or web address]

## 2019-05-03 NOTE — DISCHARGE NOTE PROVIDER - HOSPITAL COURSE
67y/o male underwent a redo right video assisted thoracoscopy, robotic assisted right middle lobe wedge resection x 2, BAL on 5/1/2019. After a Right Upper Lobe Wedge Resection on 3/26/18 was positive for cancer, a F/u pet scan showed new right lung nodule and an IR bx was positive for malignancy. Post-op the chest tube remained due to an air leak. When resolved, the     tube was removed and the pt was discharged home 69y/o male underwent a redo right video assisted thoracoscopy, robotic assisted right middle lobe wedge resection x 2, BAL on 5/1/2019. After a Right Upper Lobe Wedge Resection on 3/26/18 was positive for cancer, a F/u pet scan showed new right lung nodule and an IR bx was positive for malignancy. Post-op the chest tube remained due to an air leak. Placed on airborne precautions, all TB ruled out, AFB negative. Pt w prolonged air leak. Had 2 episodes of brief afib, given electrolytes. Seen by    Cardiology. Cleared for discharge, to continue Toprol and Start ASA. Pt w new  dx DM. D/w endocrine, Will start Metformin and do DM teaching, including Glucometer. CT placed to pneumostat    for continued air leak. CXr remained stable. Home care arranged, d/c to home w VNS w CT to pneumostat.     Vital Signs Last 24 Hrs    T(C): 36.8 (07 May 2019 11:01), Max: 37.2 (07 May 2019 00:17)    T(F): 98.3 (07 May 2019 11:01), Max: 98.9 (07 May 2019 00:17)    HR: 71 (07 May 2019 11:01) (52 - 71)    BP: 115/71 (07 May 2019 11:01) (115/71 - 135/65)    BP(mean): --    RR: 17 (07 May 2019 11:01) (17 - 18)    SpO2: 98% (07 May 2019 11:01) (98% - 100%)

## 2019-05-03 NOTE — DISCHARGE NOTE PROVIDER - NSDCACTIVITY_GEN_ALL_CORE
Sex allowed/Walking - Indoors allowed/No heavy lifting/straining/Showering allowed/Do not drive or operate machinery/Stairs allowed/Walking - Outdoors allowed Walking - Indoors allowed/Do not make important decisions/Sex allowed/Stairs allowed/Do not drive or operate machinery/No heavy lifting/straining/Walking - Outdoors allowed

## 2019-05-03 NOTE — DISCHARGE NOTE PROVIDER - PROVIDER TOKENS
PROVIDER:[TOKEN:[[id number]]] PROVIDER:[TOKEN:[[id number]]],PROVIDER:[TOKEN:[[id number]]],PROVIDER:[TOKEN:[[id number]]]

## 2019-05-03 NOTE — DISCHARGE NOTE PROVIDER - NSDCCPCAREPLAN_GEN_ALL_CORE_FT
PRINCIPAL DISCHARGE DIAGNOSIS  Diagnosis: Primary malignant neoplasm of lung, left  Assessment and Plan of Treatment:

## 2019-05-03 NOTE — DISCHARGE NOTE PROVIDER - NSDCCPTREATMENT_GEN_ALL_CORE_FT
PRINCIPAL PROCEDURE  Procedure: Wedge resection, lung, robot-assisted, using VATS  Findings and Treatment:

## 2019-05-03 NOTE — DISCHARGE NOTE PROVIDER - NSDCFUADDINST_GEN_ALL_CORE_FT
Keep the wound clean and dry Keep the wound clean and dry. You cannot shower while chest tube in place.   Visting nurse to see you daily and change dressing. Empty Pneumostat if it becomes full.  You may walk and climb stairs. Use incentive spirometer.

## 2019-05-03 NOTE — DISCHARGE NOTE PROVIDER - CARE PROVIDER_API CALL
Williams eMjía (MD)  Surgery; Thoracic Surgery  [address] [place] [address]  Phone: [phone]  Fax: [phone]  Follow Up Time: Williams Mejía)  Surgery; Thoracic Surgery  [address] [place] [address], [place] [address]  Phone: [phone]  Fax: [phone]  Follow Up Time:     Anish Mejía)  Cardiovascular Disease; Internal Medicine; Interventional Cardiology  [address] [place] [address], [place] [address]  Phone: [phone]  Fax: [phone]  Follow Up Time:     Vandana Mariscal)  EndocrinologyMetabDiabetes; Internal Medicine  [address] [place] [address]  Phone: [phone]  Fax: [phone]  Follow Up Time:

## 2019-05-04 LAB
ANION GAP SERPL CALC-SCNC: 12 MMO/L — SIGNIFICANT CHANGE UP (ref 7–14)
BACTERIA SPT RESP CULT: SIGNIFICANT CHANGE UP
BUN SERPL-MCNC: 18 MG/DL — SIGNIFICANT CHANGE UP (ref 7–23)
CALCIUM SERPL-MCNC: 9.4 MG/DL — SIGNIFICANT CHANGE UP (ref 8.4–10.5)
CHLORIDE SERPL-SCNC: 104 MMOL/L — SIGNIFICANT CHANGE UP (ref 98–107)
CO2 SERPL-SCNC: 25 MMOL/L — SIGNIFICANT CHANGE UP (ref 22–31)
CREAT SERPL-MCNC: 0.78 MG/DL — SIGNIFICANT CHANGE UP (ref 0.5–1.3)
GLUCOSE SERPL-MCNC: 185 MG/DL — HIGH (ref 70–99)
HCT VFR BLD CALC: 41.4 % — SIGNIFICANT CHANGE UP (ref 39–50)
HGB BLD-MCNC: 13.7 G/DL — SIGNIFICANT CHANGE UP (ref 13–17)
MAGNESIUM SERPL-MCNC: 1.5 MG/DL — LOW (ref 1.6–2.6)
MCHC RBC-ENTMCNC: 31.2 PG — SIGNIFICANT CHANGE UP (ref 27–34)
MCHC RBC-ENTMCNC: 33.1 % — SIGNIFICANT CHANGE UP (ref 32–36)
MCV RBC AUTO: 94.3 FL — SIGNIFICANT CHANGE UP (ref 80–100)
NRBC # FLD: 0 K/UL — SIGNIFICANT CHANGE UP (ref 0–0)
PHOSPHATE SERPL-MCNC: 2.9 MG/DL — SIGNIFICANT CHANGE UP (ref 2.5–4.5)
PLATELET # BLD AUTO: 156 K/UL — SIGNIFICANT CHANGE UP (ref 150–400)
PMV BLD: 11.2 FL — SIGNIFICANT CHANGE UP (ref 7–13)
POTASSIUM SERPL-MCNC: 3.9 MMOL/L — SIGNIFICANT CHANGE UP (ref 3.5–5.3)
POTASSIUM SERPL-SCNC: 3.9 MMOL/L — SIGNIFICANT CHANGE UP (ref 3.5–5.3)
RBC # BLD: 4.39 M/UL — SIGNIFICANT CHANGE UP (ref 4.2–5.8)
RBC # FLD: 14.2 % — SIGNIFICANT CHANGE UP (ref 10.3–14.5)
SODIUM SERPL-SCNC: 141 MMOL/L — SIGNIFICANT CHANGE UP (ref 135–145)
WBC # BLD: 6.68 K/UL — SIGNIFICANT CHANGE UP (ref 3.8–10.5)
WBC # FLD AUTO: 6.68 K/UL — SIGNIFICANT CHANGE UP (ref 3.8–10.5)

## 2019-05-04 PROCEDURE — 71045 X-RAY EXAM CHEST 1 VIEW: CPT | Mod: 26

## 2019-05-04 PROCEDURE — 99233 SBSQ HOSP IP/OBS HIGH 50: CPT

## 2019-05-04 RX ORDER — POTASSIUM CHLORIDE 20 MEQ
10 PACKET (EA) ORAL ONCE
Qty: 0 | Refills: 0 | Status: COMPLETED | OUTPATIENT
Start: 2019-05-04 | End: 2019-05-04

## 2019-05-04 RX ORDER — CLOPIDOGREL BISULFATE 75 MG/1
75 TABLET, FILM COATED ORAL DAILY
Qty: 0 | Refills: 0 | Status: DISCONTINUED | OUTPATIENT
Start: 2019-05-04 | End: 2019-05-07

## 2019-05-04 RX ORDER — METOPROLOL TARTRATE 50 MG
25 TABLET ORAL DAILY
Qty: 0 | Refills: 0 | Status: DISCONTINUED | OUTPATIENT
Start: 2019-05-04 | End: 2019-05-07

## 2019-05-04 RX ORDER — MAGNESIUM SULFATE 500 MG/ML
2 VIAL (ML) INJECTION ONCE
Qty: 0 | Refills: 0 | Status: COMPLETED | OUTPATIENT
Start: 2019-05-04 | End: 2019-05-04

## 2019-05-04 RX ADMIN — OXYCODONE HYDROCHLORIDE 10 MILLIGRAM(S): 5 TABLET ORAL at 22:12

## 2019-05-04 RX ADMIN — Medication 4: at 09:13

## 2019-05-04 RX ADMIN — AMLODIPINE BESYLATE 5 MILLIGRAM(S): 2.5 TABLET ORAL at 05:27

## 2019-05-04 RX ADMIN — Medication 650 MILLIGRAM(S): at 05:26

## 2019-05-04 RX ADMIN — ATORVASTATIN CALCIUM 20 MILLIGRAM(S): 80 TABLET, FILM COATED ORAL at 22:08

## 2019-05-04 RX ADMIN — OXYCODONE HYDROCHLORIDE 10 MILLIGRAM(S): 5 TABLET ORAL at 23:00

## 2019-05-04 RX ADMIN — Medication 10 MILLIEQUIVALENT(S): at 09:13

## 2019-05-04 RX ADMIN — CLOPIDOGREL BISULFATE 75 MILLIGRAM(S): 75 TABLET, FILM COATED ORAL at 13:05

## 2019-05-04 RX ADMIN — HEPARIN SODIUM 5000 UNIT(S): 5000 INJECTION INTRAVENOUS; SUBCUTANEOUS at 13:05

## 2019-05-04 RX ADMIN — Medication 50 GRAM(S): at 05:27

## 2019-05-04 RX ADMIN — TAMSULOSIN HYDROCHLORIDE 0.4 MILLIGRAM(S): 0.4 CAPSULE ORAL at 22:08

## 2019-05-04 RX ADMIN — HEPARIN SODIUM 5000 UNIT(S): 5000 INJECTION INTRAVENOUS; SUBCUTANEOUS at 22:08

## 2019-05-04 RX ADMIN — HEPARIN SODIUM 5000 UNIT(S): 5000 INJECTION INTRAVENOUS; SUBCUTANEOUS at 05:26

## 2019-05-04 RX ADMIN — Medication 25 MILLIGRAM(S): at 05:27

## 2019-05-04 RX ADMIN — Medication 650 MILLIGRAM(S): at 06:00

## 2019-05-04 NOTE — PROGRESS NOTE ADULT - SUBJECTIVE AND OBJECTIVE BOX
NAD in chair   Chest tube in place. On water seal.  Mild air leak with cough    VS Stable Afeb    Lungs- crackles right base  Heart- Reg  Abd- non tender  Ext- no edema    IMP: Comfortable post VATS ; granuloma   All specimens AFB negative thus far (sputum  , BAL)    PLAN: F/u final path  Serum quantiferon  F/u chest xray  D/C isolation  As per CT surgery

## 2019-05-04 NOTE — PROVIDER CONTACT NOTE (OTHER) - ACTION/TREATMENT ORDERED:
Team aware and at bedside. Mg. Sulfate, Metroprolol Succinate, and AM BP meds to be given as per orders. Will continue to monitor.

## 2019-05-04 NOTE — PROGRESS NOTE ADULT - SUBJECTIVE AND OBJECTIVE BOX
Subjective:  phone used for translation in Polish. Pt states he feels well. No CP or SOB. Ambulating frequently.     Vital Signs:  Vital Signs Last 24 Hrs  T(C): 36.7 (05-04-19 @ 11:14), Max: 37 (05-03-19 @ 12:48)  T(F): 98 (05-04-19 @ 11:14), Max: 98.6 (05-03-19 @ 12:48)  HR: 62 (05-04-19 @ 11:14) (51 - 118)  BP: 117/61 (05-04-19 @ 11:14) (117/61 - 143/70)  RR: 18 (05-04-19 @ 11:14) (18 - 18)  SpO2: 97% (05-04-19 @ 11:14) (96% - 98%) on (O2)    Telemetry/Alarms: Rate controlled Afib this am 4:30am-6:30am, now converted back to SR.   General: WN/WD NAD  Neurology: Awake, nonfocal, RAM x 4  Eyes: Scleras clear, PERRLA/ EOMI, Gross vision intact  ENT:Gross hearing intact, grossly patent pharynx, no stridor  Neck: Neck supple, trachea midline, No JVD,   Respiratory: CTA B/L, No wheezing, rales, rhonchi  CV: RRR, S1S2, no murmurs, rubs or gallops  Abdominal: Soft, NT, ND +BS, +BM this am  Extremities: No edema, + peripheral pulses  Skin: No Rashes, Hematoma, Ecchymosis  Lymphatic: No Neck, axilla, groin LAD  Psych: Oriented x 3, normal affect  Incisions: Rt. VATS c/d/i  Tubes: Rt. CT 15cc/24hrs on WS, + AL  Relevant labs, radiology and Medications reviewed           CXR sml stable ptx             13.7   6.68  )-----------( 156      ( 04 May 2019 05:19 )             41.4     05-04    141  |  104  |  18  ----------------------------<  185<H>  3.9   |  25  |  0.78    Ca    9.4      04 May 2019 05:19  Phos  2.9     05-04  Mg     1.5     05-04        MEDICATIONS  (STANDING):  amLODIPine   Tablet 5 milliGRAM(s) Oral daily  atorvastatin 20 milliGRAM(s) Oral at bedtime  clopidogrel Tablet 75 milliGRAM(s) Oral daily  dextrose 5%. 1000 milliLiter(s) (50 mL/Hr) IV Continuous <Continuous>  dextrose 50% Injectable 12.5 Gram(s) IV Push once  dextrose 50% Injectable 25 Gram(s) IV Push once  dextrose 50% Injectable 25 Gram(s) IV Push once  heparin  Injectable 5000 Unit(s) SubCutaneous every 8 hours  insulin lispro (HumaLOG) corrective regimen sliding scale   SubCutaneous three times a day before meals  insulin lispro (HumaLOG) corrective regimen sliding scale   SubCutaneous at bedtime  metoprolol succinate ER 25 milliGRAM(s) Oral daily  tamsulosin 0.4 milliGRAM(s) Oral at bedtime    MEDICATIONS  (PRN):  dextrose 40% Gel 15 Gram(s) Oral once PRN Blood Glucose LESS THAN 70 milliGRAM(s)/deciliter  glucagon  Injectable 1 milliGRAM(s) IntraMuscular once PRN Glucose LESS THAN 70 milligrams/deciliter  oxyCODONE    IR 5 milliGRAM(s) Oral every 3 hours PRN Moderate Pain (4 - 6)  oxyCODONE    IR 10 milliGRAM(s) Oral every 3 hours PRN Severe Pain (7 - 10)    Pertinent Physical Exam  I&O's Summary    03 May 2019 07:01  -  04 May 2019 07:00  --------------------------------------------------------  IN: 1440 mL / OUT: 1855 mL / NET: -415 mL        Assessment  69y Male  w/ PAST MEDICAL & SURGICAL HISTORY:  Diabetes mellitus: type 2  Malignant neoplasm of lung: denies chemo and radiation  BPH (benign prostatic hyperplasia)  Smoking  Lung nodule  AAA (abdominal aortic aneurysm)  MI (myocardial infarction)  Coronary insufficiency  HLD (hyperlipidemia)  HTN (hypertension)  History of lung biopsy: 3/2019  H/O knee surgery: rt knee meniscus  History of lung surgery: S/P Rt VATS wedge on 2018  History of ear surgery  S/P primary angioplasty with coronary stent: X1 2017, X2 2009  admitted with complaints of Patient is a 69y old  Male who presents with a chief complaint of R lung nodule (03 May 2019 05:22)    67y/o male w/ PMH of CAD s/p stent placement X3 last 5/2017, AAA, BPH, HTN, lung cancer, s/p lung ca, S/P right Video Assisted Thoracoscopy/ Right Upper Lobe Wedge Resection on 3/26/18. Now POD 2 from right vats, Wedge resection, BAL 5/1/19. Post op course c/b air leak. On isolation pending BAL results to r/o TB. 5/3-All AFB negative. Isolation d/c'd. CT kept for air leak    PLAN  Neuro: Pain management  Pulm: Encourage coughing, deep breathing and use of incentive spirometry. Wean off supplemental oxygen as able. Daily CXR.   Cardio: Monitor telemetry/alarms. Afib early this am, Toprol restarted and converted back to SR.   GI: Tolerating diet. Continue stool softeners.  Renal: monitor urine output, supplement electrolytes as needed  Vasc: Heparin SC/SCDs for DVT prophylaxis  Heme: Stable H/H. .   ID: Off antibiotics. Stable.  Therapy: OOB/ambulate  Tubes: Monitor Chest tube output and airleak. Possible Doxy Pleurodesis tomorrow.   Disposition: Aim to D/C to home once CT removed.   Discussed with Cardiothoracic Team at AM rounds.

## 2019-05-05 PROCEDURE — 71045 X-RAY EXAM CHEST 1 VIEW: CPT | Mod: 26

## 2019-05-05 RX ADMIN — CLOPIDOGREL BISULFATE 75 MILLIGRAM(S): 75 TABLET, FILM COATED ORAL at 12:35

## 2019-05-05 RX ADMIN — Medication 25 MILLIGRAM(S): at 04:55

## 2019-05-05 RX ADMIN — AMLODIPINE BESYLATE 5 MILLIGRAM(S): 2.5 TABLET ORAL at 04:55

## 2019-05-05 RX ADMIN — HEPARIN SODIUM 5000 UNIT(S): 5000 INJECTION INTRAVENOUS; SUBCUTANEOUS at 04:55

## 2019-05-05 RX ADMIN — HEPARIN SODIUM 5000 UNIT(S): 5000 INJECTION INTRAVENOUS; SUBCUTANEOUS at 13:39

## 2019-05-05 RX ADMIN — TAMSULOSIN HYDROCHLORIDE 0.4 MILLIGRAM(S): 0.4 CAPSULE ORAL at 21:00

## 2019-05-05 RX ADMIN — Medication 2: at 09:18

## 2019-05-05 RX ADMIN — ATORVASTATIN CALCIUM 20 MILLIGRAM(S): 80 TABLET, FILM COATED ORAL at 21:00

## 2019-05-05 RX ADMIN — HEPARIN SODIUM 5000 UNIT(S): 5000 INJECTION INTRAVENOUS; SUBCUTANEOUS at 21:00

## 2019-05-05 NOTE — PROGRESS NOTE ADULT - SUBJECTIVE AND OBJECTIVE BOX
Subjective: no acute complaints     Vital Signs:  Vital Signs Last 24 Hrs  T(C): 36.9 (05-05-19 @ 08:35), Max: 37.3 (05-04-19 @ 23:53)  T(F): 98.5 (05-05-19 @ 08:35), Max: 99.2 (05-04-19 @ 23:53)  HR: 83 (05-05-19 @ 08:35) (57 - 97)  BP: 131/67 (05-05-19 @ 08:35) (117/61 - 156/72)  RR: 17 (05-05-19 @ 08:35) (17 - 18)  SpO2: 96% (05-05-19 @ 08:35) (94% - 98%) on (O2)    Telemetry/Alarms:  General: WN/WD NAD  Neurology: Awake, nonfocal, RAM x 4  Eyes: Scleras clear, PERRLA/ EOMI, Gross vision intact  ENT:Gross hearing intact, grossly patent pharynx, no stridor  Neck: Neck supple, trachea midline, No JVD,   Respiratory: CTA B/L, No wheezing, rales, rhonchi  CV: RRR, S1S2, no murmurs, rubs or gallops  Abdominal: Soft, NT, ND +BS,   Extremities: No edema, + peripheral pulses  Skin: No Rashes, Hematoma, Ecchymosis  Lymphatic: No Neck, axilla, groin LAD  Psych: Oriented x 3, normal affect  Incisions: C/D/I   Tubes: Right CT drained 10 ml/24 hours; + forced expiratory AL   Relevant labs, radiology and Medications reviewed                        13.7   6.68  )-----------( 156      ( 04 May 2019 05:19 )             41.4     05-04    141  |  104  |  18  ----------------------------<  185<H>  3.9   |  25  |  0.78    Ca    9.4      04 May 2019 05:19  Phos  2.9     05-04  Mg     1.5     05-04        MEDICATIONS  (STANDING):  amLODIPine   Tablet 5 milliGRAM(s) Oral daily  atorvastatin 20 milliGRAM(s) Oral at bedtime  clopidogrel Tablet 75 milliGRAM(s) Oral daily  dextrose 5%. 1000 milliLiter(s) (50 mL/Hr) IV Continuous <Continuous>  dextrose 50% Injectable 12.5 Gram(s) IV Push once  dextrose 50% Injectable 25 Gram(s) IV Push once  dextrose 50% Injectable 25 Gram(s) IV Push once  heparin  Injectable 5000 Unit(s) SubCutaneous every 8 hours  insulin lispro (HumaLOG) corrective regimen sliding scale   SubCutaneous three times a day before meals  insulin lispro (HumaLOG) corrective regimen sliding scale   SubCutaneous at bedtime  metoprolol succinate ER 25 milliGRAM(s) Oral daily  tamsulosin 0.4 milliGRAM(s) Oral at bedtime    MEDICATIONS  (PRN):  dextrose 40% Gel 15 Gram(s) Oral once PRN Blood Glucose LESS THAN 70 milliGRAM(s)/deciliter  glucagon  Injectable 1 milliGRAM(s) IntraMuscular once PRN Glucose LESS THAN 70 milligrams/deciliter  oxyCODONE    IR 5 milliGRAM(s) Oral every 3 hours PRN Moderate Pain (4 - 6)  oxyCODONE    IR 10 milliGRAM(s) Oral every 3 hours PRN Severe Pain (7 - 10)    Pertinent Physical Exam  I&O's Summary    04 May 2019 07:01  -  05 May 2019 07:00  --------------------------------------------------------  IN: 0 mL / OUT: 1110 mL / NET: -1110 mL        Assessment  69y Male  w/ PAST MEDICAL & SURGICAL HISTORY:  Diabetes mellitus: type 2  Malignant neoplasm of lung: denies chemo and radiation  BPH (benign prostatic hyperplasia)  Smoking  Lung nodule  AAA (abdominal aortic aneurysm)  MI (myocardial infarction)  Coronary insufficiency  HLD (hyperlipidemia)  HTN (hypertension)  History of lung biopsy: 3/2019  H/O knee surgery: rt knee meniscus  History of lung surgery: S/P Rt VATS wedge on 2018  History of ear surgery  S/P primary angioplasty with coronary stent: X1 2017, X2 2009  admitted with complaints of Patient is a 69y old  Male who presents with a chief complaint of lung surgery (04 May 2019 12:23)      69y/o male w/ PMH of CAD s/p stent placement X3 last 5/2017, AAA, BPH, HTN, lung cancer, s/p lung ca, S/P right Video Assisted Thoracoscopy/ Right Upper Lobe Wedge Resection on 3/26/18. Now POD 2 from right vats, Wedge resection, BAL 5/1/19. Post op course c/b air leak. On isolation pending BAL results to r/o TB. 5/3-All AFB negative. Isolation d/c'd. CT kept for air leak      PLAN  Neuro: Pain management  Pulm: Encourage coughing, deep breathing and use of incentive spirometry. Wean off supplemental oxygen as able. Daily CXR.   Cardio: Monitor telemetry/alarms  GI: Tolerating diet. Continue stool softeners.  Renal: monitor urine output, supplement electrolytes as needed  Vasc: Heparin SC/SCDs for DVT prophylaxis  Heme: Stable H/H.    ID: Off antibiotics. Stable.  Therapy: OOB/ambulate  Tubes: Monitor Chest tube output and airleak   Disposition: Aim to D/C to home when chest tube is removed   Discussed with Cardiothoracic Team at AM rounds. Subjective: no acute complaints     Vital Signs:  Vital Signs Last 24 Hrs  T(C): 36.9 (05-05-19 @ 08:35), Max: 37.3 (05-04-19 @ 23:53)  T(F): 98.5 (05-05-19 @ 08:35), Max: 99.2 (05-04-19 @ 23:53)  HR: 83 (05-05-19 @ 08:35) (57 - 97)  BP: 131/67 (05-05-19 @ 08:35) (117/61 - 156/72)  RR: 17 (05-05-19 @ 08:35) (17 - 18)  SpO2: 96% (05-05-19 @ 08:35) (94% - 98%) on (O2)    Telemetry/Alarms:  General: WN/WD NAD  Neurology: Awake, nonfocal, RAM x 4  Eyes: Scleras clear, PERRLA/ EOMI, Gross vision intact  ENT:Gross hearing intact, grossly patent pharynx, no stridor  Neck: Neck supple, trachea midline, No JVD,   Respiratory: CTA B/L, No wheezing, rales, rhonchi  CV: RRR, S1S2, no murmurs, rubs or gallops  Abdominal: Soft, NT, ND +BS,   Extremities: No edema, + peripheral pulses  Skin: No Rashes, Hematoma, Ecchymosis  Lymphatic: No Neck, axilla, groin LAD  Psych: Oriented x 3, normal affect  Incisions: C/D/I   Tubes: Right CT drained 10 ml/24 hours; + forced expiratory AL   Relevant labs, radiology and Medications reviewed                        13.7   6.68  )-----------( 156      ( 04 May 2019 05:19 )             41.4     05-04    141  |  104  |  18  ----------------------------<  185<H>  3.9   |  25  |  0.78    Ca    9.4      04 May 2019 05:19  Phos  2.9     05-04  Mg     1.5     05-04        MEDICATIONS  (STANDING):  amLODIPine   Tablet 5 milliGRAM(s) Oral daily  atorvastatin 20 milliGRAM(s) Oral at bedtime  clopidogrel Tablet 75 milliGRAM(s) Oral daily  dextrose 5%. 1000 milliLiter(s) (50 mL/Hr) IV Continuous <Continuous>  dextrose 50% Injectable 12.5 Gram(s) IV Push once  dextrose 50% Injectable 25 Gram(s) IV Push once  dextrose 50% Injectable 25 Gram(s) IV Push once  heparin  Injectable 5000 Unit(s) SubCutaneous every 8 hours  insulin lispro (HumaLOG) corrective regimen sliding scale   SubCutaneous three times a day before meals  insulin lispro (HumaLOG) corrective regimen sliding scale   SubCutaneous at bedtime  metoprolol succinate ER 25 milliGRAM(s) Oral daily  tamsulosin 0.4 milliGRAM(s) Oral at bedtime    MEDICATIONS  (PRN):  dextrose 40% Gel 15 Gram(s) Oral once PRN Blood Glucose LESS THAN 70 milliGRAM(s)/deciliter  glucagon  Injectable 1 milliGRAM(s) IntraMuscular once PRN Glucose LESS THAN 70 milligrams/deciliter  oxyCODONE    IR 5 milliGRAM(s) Oral every 3 hours PRN Moderate Pain (4 - 6)  oxyCODONE    IR 10 milliGRAM(s) Oral every 3 hours PRN Severe Pain (7 - 10)    Pertinent Physical Exam  I&O's Summary    04 May 2019 07:01  -  05 May 2019 07:00  --------------------------------------------------------  IN: 0 mL / OUT: 1110 mL / NET: -1110 mL        Assessment  69y Male  w/ PAST MEDICAL & SURGICAL HISTORY:  Diabetes mellitus: type 2  Malignant neoplasm of lung: denies chemo and radiation  BPH (benign prostatic hyperplasia)  Smoking  Lung nodule  AAA (abdominal aortic aneurysm)  MI (myocardial infarction)  Coronary insufficiency  HLD (hyperlipidemia)  HTN (hypertension)  History of lung biopsy: 3/2019  H/O knee surgery: rt knee meniscus  History of lung surgery: S/P Rt VATS wedge on 2018  History of ear surgery  S/P primary angioplasty with coronary stent: X1 2017, X2 2009  admitted with complaints of Patient is a 69y old  Male who presents with a chief complaint of lung surgery (04 May 2019 12:23)      67y/o male w/ PMH of CAD s/p stent placement X3 last 5/2017, AAA, BPH, HTN, lung cancer, s/p lung ca, S/P right Video Assisted Thoracoscopy/ Right Upper Lobe Wedge Resection on 3/26/18. Now POD 2 from right vats, Wedge resection, BAL 5/1/19. Post op course c/b air leak. On isolation pending BAL results to r/o TB. 5/3-All AFB negative. Isolation d/c'd. CT kept for air leak      PLAN  Neuro: Pain management  Pulm: Encourage coughing, deep breathing and use of incentive spirometry. Wean off supplemental oxygen as able. Daily CXR.   Cardio: Monitor telemetry/alarms  GI: Tolerating diet. Continue stool softeners.  Renal: monitor urine output, supplement electrolytes as needed  Vasc: Heparin SC/SCDs for DVT prophylaxis  Heme: Stable H/H.    ID: Off antibiotics. Stable.  Therapy: OOB/ambulate  Tubes: Monitor Chest tube output and airleak. Consider doxycycline pleurodesis tomorrow if airleak does not resolve   Disposition: Aim to D/C to home when chest tube is removed   Discussed with Cardiothoracic Team at AM rounds. Subjective: no acute complaints     Polish : Hafsa ID [id number]    Vital Signs:  Vital Signs Last 24 Hrs  T(C): 36.9 (05-05-19 @ 08:35), Max: 37.3 (05-04-19 @ 23:53)  T(F): 98.5 (05-05-19 @ 08:35), Max: 99.2 (05-04-19 @ 23:53)  HR: 83 (05-05-19 @ 08:35) (57 - 97)  BP: 131/67 (05-05-19 @ 08:35) (117/61 - 156/72)  RR: 17 (05-05-19 @ 08:35) (17 - 18)  SpO2: 96% (05-05-19 @ 08:35) (94% - 98%) on (O2)    Telemetry/Alarms:  General: WN/WD NAD  Neurology: Awake, nonfocal, RAM x 4  Eyes: Scleras clear, PERRLA/ EOMI, Gross vision intact  ENT:Gross hearing intact, grossly patent pharynx, no stridor  Neck: Neck supple, trachea midline, No JVD,   Respiratory: CTA B/L, No wheezing, rales, rhonchi  CV: RRR, S1S2, no murmurs, rubs or gallops  Abdominal: Soft, NT, ND +BS,   Extremities: No edema, + peripheral pulses  Skin: No Rashes, Hematoma, Ecchymosis  Lymphatic: No Neck, axilla, groin LAD  Psych: Oriented x 3, normal affect  Incisions: C/D/I   Tubes: Right CT drained 10 ml/24 hours; + forced expiratory AL   Relevant labs, radiology and Medications reviewed                        13.7   6.68  )-----------( 156      ( 04 May 2019 05:19 )             41.4     05-04    141  |  104  |  18  ----------------------------<  185<H>  3.9   |  25  |  0.78    Ca    9.4      04 May 2019 05:19  Phos  2.9     05-04  Mg     1.5     05-04        MEDICATIONS  (STANDING):  amLODIPine   Tablet 5 milliGRAM(s) Oral daily  atorvastatin 20 milliGRAM(s) Oral at bedtime  clopidogrel Tablet 75 milliGRAM(s) Oral daily  dextrose 5%. 1000 milliLiter(s) (50 mL/Hr) IV Continuous <Continuous>  dextrose 50% Injectable 12.5 Gram(s) IV Push once  dextrose 50% Injectable 25 Gram(s) IV Push once  dextrose 50% Injectable 25 Gram(s) IV Push once  heparin  Injectable 5000 Unit(s) SubCutaneous every 8 hours  insulin lispro (HumaLOG) corrective regimen sliding scale   SubCutaneous three times a day before meals  insulin lispro (HumaLOG) corrective regimen sliding scale   SubCutaneous at bedtime  metoprolol succinate ER 25 milliGRAM(s) Oral daily  tamsulosin 0.4 milliGRAM(s) Oral at bedtime    MEDICATIONS  (PRN):  dextrose 40% Gel 15 Gram(s) Oral once PRN Blood Glucose LESS THAN 70 milliGRAM(s)/deciliter  glucagon  Injectable 1 milliGRAM(s) IntraMuscular once PRN Glucose LESS THAN 70 milligrams/deciliter  oxyCODONE    IR 5 milliGRAM(s) Oral every 3 hours PRN Moderate Pain (4 - 6)  oxyCODONE    IR 10 milliGRAM(s) Oral every 3 hours PRN Severe Pain (7 - 10)    Pertinent Physical Exam  I&O's Summary    04 May 2019 07:01  -  05 May 2019 07:00  --------------------------------------------------------  IN: 0 mL / OUT: 1110 mL / NET: -1110 mL        Assessment  69y Male  w/ PAST MEDICAL & SURGICAL HISTORY:  Diabetes mellitus: type 2  Malignant neoplasm of lung: denies chemo and radiation  BPH (benign prostatic hyperplasia)  Smoking  Lung nodule  AAA (abdominal aortic aneurysm)  MI (myocardial infarction)  Coronary insufficiency  HLD (hyperlipidemia)  HTN (hypertension)  History of lung biopsy: 3/2019  H/O knee surgery: rt knee meniscus  History of lung surgery: S/P Rt VATS wedge on 2018  History of ear surgery  S/P primary angioplasty with coronary stent: X1 2017, X2 2009  admitted with complaints of Patient is a 69y old  Male who presents with a chief complaint of lung surgery (04 May 2019 12:23)      67y/o male w/ PMH of CAD s/p stent placement X3 last 5/2017, AAA, BPH, HTN, lung cancer, s/p lung ca, S/P right Video Assisted Thoracoscopy/ Right Upper Lobe Wedge Resection on 3/26/18. Now POD 2 from right vats, Wedge resection, BAL 5/1/19. Post op course c/b air leak. On isolation pending BAL results to r/o TB. 5/3-All AFB negative. Isolation d/c'd. CT kept for air leak      PLAN  Neuro: Pain management  Pulm: Encourage coughing, deep breathing and use of incentive spirometry. Wean off supplemental oxygen as able. Daily CXR.   Cardio: Monitor telemetry/alarms  GI: Tolerating diet. Continue stool softeners.  Renal: monitor urine output, supplement electrolytes as needed  Vasc: Heparin SC/SCDs for DVT prophylaxis  Heme: Stable H/H.    ID: Off antibiotics. Stable.  Therapy: OOB/ambulate  Tubes: Monitor Chest tube output and airleak. Consider doxycycline pleurodesis tomorrow if airleak does not resolve   Disposition: Aim to D/C to home when chest tube is removed   Discussed with Cardiothoracic Team at AM rounds.

## 2019-05-06 PROCEDURE — 99233 SBSQ HOSP IP/OBS HIGH 50: CPT

## 2019-05-06 PROCEDURE — 71045 X-RAY EXAM CHEST 1 VIEW: CPT | Mod: 26,76

## 2019-05-06 RX ORDER — MAGNESIUM SULFATE 500 MG/ML
2 VIAL (ML) INJECTION ONCE
Qty: 0 | Refills: 0 | Status: COMPLETED | OUTPATIENT
Start: 2019-05-06 | End: 2019-05-06

## 2019-05-06 RX ADMIN — AMLODIPINE BESYLATE 5 MILLIGRAM(S): 2.5 TABLET ORAL at 05:15

## 2019-05-06 RX ADMIN — HEPARIN SODIUM 5000 UNIT(S): 5000 INJECTION INTRAVENOUS; SUBCUTANEOUS at 05:15

## 2019-05-06 RX ADMIN — Medication 25 MILLIGRAM(S): at 05:15

## 2019-05-06 RX ADMIN — CLOPIDOGREL BISULFATE 75 MILLIGRAM(S): 75 TABLET, FILM COATED ORAL at 12:58

## 2019-05-06 RX ADMIN — HEPARIN SODIUM 5000 UNIT(S): 5000 INJECTION INTRAVENOUS; SUBCUTANEOUS at 22:09

## 2019-05-06 RX ADMIN — ATORVASTATIN CALCIUM 20 MILLIGRAM(S): 80 TABLET, FILM COATED ORAL at 22:08

## 2019-05-06 RX ADMIN — TAMSULOSIN HYDROCHLORIDE 0.4 MILLIGRAM(S): 0.4 CAPSULE ORAL at 22:08

## 2019-05-06 RX ADMIN — HEPARIN SODIUM 5000 UNIT(S): 5000 INJECTION INTRAVENOUS; SUBCUTANEOUS at 13:46

## 2019-05-06 RX ADMIN — Medication 50 GRAM(S): at 22:07

## 2019-05-06 RX ADMIN — Medication 2: at 08:54

## 2019-05-06 NOTE — PROGRESS NOTE ADULT - SUBJECTIVE AND OBJECTIVE BOX
Comfortable OOB   Chest tube in place; connected to valve    VS Stable Afeb    Lungs- minimal rhonchi bilat  Heart- Reg  Abd- non tender  Ext- no edema    IMP: Stable post VATS resection of lung lesion; granuloma. Persistent air leak  All specimens AFB smear negative    PLAN: F/u cultures as outpt  Serum quantiferon  As per CT surgery    Silvio Cunningham MD FCCP  [phone]  (Octavio/Sabrina/Raf)

## 2019-05-06 NOTE — PROGRESS NOTE ADULT - SUBJECTIVE AND OBJECTIVE BOX
#[id number] used  Subjective: 68 y/o polish speaking male, presents sitting up in chair in NAD. He denies sob, cp, n/v/d. He is ambulating without issues, using ISS. Chest tube is in place with persistent air leak. Explained to Mr. Olszewski that today we would be changing his pleurEvac to a pnuemostat in an effort to try to get him home to allow his air leak to resolve. He understands and agrees. All questions answered.     Vital Signs:  Vital Signs Last 24 Hrs  T(C): 36.8 (05-06-19 @ 07:59), Max: 37.5 (05-06-19 @ 00:07)  T(F): 98.3 (05-06-19 @ 07:59), Max: 99.5 (05-06-19 @ 00:07)  HR: 67 (05-06-19 @ 07:59) (63 - 116)  BP: 139/68 (05-06-19 @ 07:59) (120/66 - 148/68)  RR: 18 (05-06-19 @ 07:59) (18 - 18)  SpO2: 100% (05-06-19 @ 07:59) (96% - 100%) on (O2)    Pertinent Physical Exam:  Telemetry/Alarms:  General: WN/WD NAD  Neurology: Awake, nonfocal, RAM x 4  Eyes: Scleras clear, PERRLA/ EOMI, Gross vision intact  ENT:Gross hearing intact, grossly patent pharynx, no stridor  Neck: Neck supple, trachea midline, No JVD,   Respiratory: CTA B/L, No wheezing, rales, rhonchi  CV: RRR, S1S2, no murmurs, rubs or gallops  Abdominal: Soft, NT, ND +BS,   Extremities: No edema, + peripheral pulses  Skin: No Rashes, Hematoma, Ecchymosis  Lymphatic: No Neck, axilla, groin LAD  Psych: Oriented x 3, normal affect  Incisions:   Tubes:    Chest Tube: Right Side/Left Side    Air Leak: Yes[] / No[]    Drainage:     I&O's Summary    05 May 2019 07:01  -  06 May 2019 07:00  --------------------------------------------------------  IN: 960 mL / OUT: 2252 mL / NET: -1292 mL        Relevant labs, radiology and Medications reviewed    CXR: < from: Xray Chest 1 View- PORTABLE-Routine (05.06.19 @ 08:24) >    IMPRESSION:  Tip of chest tube projects over the right upper chest.    Stable small right pneumothorax and right subcutaneous emphysema.        < end of copied text >        MEDICATIONS  (STANDING):  amLODIPine   Tablet 5 milliGRAM(s) Oral daily  atorvastatin 20 milliGRAM(s) Oral at bedtime  clopidogrel Tablet 75 milliGRAM(s) Oral daily  dextrose 5%. 1000 milliLiter(s) (50 mL/Hr) IV Continuous <Continuous>  dextrose 50% Injectable 12.5 Gram(s) IV Push once  dextrose 50% Injectable 25 Gram(s) IV Push once  dextrose 50% Injectable 25 Gram(s) IV Push once  heparin  Injectable 5000 Unit(s) SubCutaneous every 8 hours  insulin lispro (HumaLOG) corrective regimen sliding scale   SubCutaneous three times a day before meals  insulin lispro (HumaLOG) corrective regimen sliding scale   SubCutaneous at bedtime  metoprolol succinate ER 25 milliGRAM(s) Oral daily  tamsulosin 0.4 milliGRAM(s) Oral at bedtime    MEDICATIONS  (PRN):  dextrose 40% Gel 15 Gram(s) Oral once PRN Blood Glucose LESS THAN 70 milliGRAM(s)/deciliter  glucagon  Injectable 1 milliGRAM(s) IntraMuscular once PRN Glucose LESS THAN 70 milligrams/deciliter  oxyCODONE    IR 5 milliGRAM(s) Oral every 3 hours PRN Moderate Pain (4 - 6)  oxyCODONE    IR 10 milliGRAM(s) Oral every 3 hours PRN Severe Pain (7 - 10)      Assessment  69y Male  w/ PAST MEDICAL & SURGICAL HISTORY:  Diabetes mellitus: type 2  Malignant neoplasm of lung: denies chemo and radiation  BPH (benign prostatic hyperplasia)  Smoking  Lung nodule  AAA (abdominal aortic aneurysm)  MI (myocardial infarction)  Coronary insufficiency  HLD (hyperlipidemia)  HTN (hypertension)  History of lung biopsy: 3/2019  H/O knee surgery: rt knee meniscus  History of lung surgery: S/P Rt VATS wedge on 2018  History of ear surgery  S/P primary angioplasty with coronary stent: X1 2017, X2 2009  admitted with complaints of Patient is a 69y old  Male who presents with a chief complaint of lung surgery (04 May 2019 12:23)    67y/o male w/ PMH of CAD s/p stent placement X3 last 5/2017, AAA, BPH, HTN, lung cancer, s/p lung ca, S/P right Video Assisted Thoracoscopy/ Right Upper Lobe Wedge Resection on 3/26/18. Now POD 2 from right vats, Wedge resection, BAL 5/1/19. Post op course c/b air leak. On isolation pending BAL results to r/o TB. 5/3-All AFB negative. Isolation d/c'd. CT kept for air leak 5/6 CT to pneumostat        PLAN  Neuro: Pain management  Pulm: Encourage coughing, deep breathing and use of incentive spirometry.  Daily CXR. F/U with Pulm as oupt  Cardio: Monitor telemetry/alarms. Continue with toprol for afib  GI: Tolerating diet. Continue stool softeners.  Renal: monitor urine output, supplement electrolytes as needed  Vasc: Plavix restarted, SCDs for DVT prophylaxis  Heme: Stable H/H.   ID: Off antibiotics. Stable.  Therapy: OOB/ambulate  Tubes: Monitor Chest tube output, change to pnuemostat  Disposition: Aim to place chest tube to pneumostat, watch for toleration and possibly d/c home tomorrow with tube and VNS  Discussed with Cardiothoracic Team at AM rounds.

## 2019-05-07 ENCOUNTER — TRANSCRIPTION ENCOUNTER (OUTPATIENT)
Age: 69
End: 2019-05-07

## 2019-05-07 ENCOUNTER — INBOUND DOCUMENT (OUTPATIENT)
Age: 69
End: 2019-05-07

## 2019-05-07 VITALS
TEMPERATURE: 98 F | SYSTOLIC BLOOD PRESSURE: 115 MMHG | OXYGEN SATURATION: 98 % | DIASTOLIC BLOOD PRESSURE: 71 MMHG | RESPIRATION RATE: 17 BRPM | HEART RATE: 71 BPM

## 2019-05-07 LAB
ANION GAP SERPL CALC-SCNC: 11 MMO/L — SIGNIFICANT CHANGE UP (ref 7–14)
BUN SERPL-MCNC: 19 MG/DL — SIGNIFICANT CHANGE UP (ref 7–23)
CALCIUM SERPL-MCNC: 9.4 MG/DL — SIGNIFICANT CHANGE UP (ref 8.4–10.5)
CHLORIDE SERPL-SCNC: 103 MMOL/L — SIGNIFICANT CHANGE UP (ref 98–107)
CO2 SERPL-SCNC: 26 MMOL/L — SIGNIFICANT CHANGE UP (ref 22–31)
CREAT SERPL-MCNC: 0.84 MG/DL — SIGNIFICANT CHANGE UP (ref 0.5–1.3)
CULTURE - SURGICAL SITE: SIGNIFICANT CHANGE UP
CULTURE - SURGICAL SITE: SIGNIFICANT CHANGE UP
GLUCOSE SERPL-MCNC: 186 MG/DL — HIGH (ref 70–99)
HCT VFR BLD CALC: 41.1 % — SIGNIFICANT CHANGE UP (ref 39–50)
HGB BLD-MCNC: 13.6 G/DL — SIGNIFICANT CHANGE UP (ref 13–17)
MAGNESIUM SERPL-MCNC: 1.8 MG/DL — SIGNIFICANT CHANGE UP (ref 1.6–2.6)
MCHC RBC-ENTMCNC: 30.8 PG — SIGNIFICANT CHANGE UP (ref 27–34)
MCHC RBC-ENTMCNC: 33.1 % — SIGNIFICANT CHANGE UP (ref 32–36)
MCV RBC AUTO: 93.2 FL — SIGNIFICANT CHANGE UP (ref 80–100)
NRBC # FLD: 0 K/UL — SIGNIFICANT CHANGE UP (ref 0–0)
PLATELET # BLD AUTO: 179 K/UL — SIGNIFICANT CHANGE UP (ref 150–400)
PMV BLD: 11.3 FL — SIGNIFICANT CHANGE UP (ref 7–13)
POTASSIUM SERPL-MCNC: 4.2 MMOL/L — SIGNIFICANT CHANGE UP (ref 3.5–5.3)
POTASSIUM SERPL-SCNC: 4.2 MMOL/L — SIGNIFICANT CHANGE UP (ref 3.5–5.3)
RBC # BLD: 4.41 M/UL — SIGNIFICANT CHANGE UP (ref 4.2–5.8)
RBC # FLD: 13.4 % — SIGNIFICANT CHANGE UP (ref 10.3–14.5)
SODIUM SERPL-SCNC: 140 MMOL/L — SIGNIFICANT CHANGE UP (ref 135–145)
WBC # BLD: 6.65 K/UL — SIGNIFICANT CHANGE UP (ref 3.8–10.5)
WBC # FLD AUTO: 6.65 K/UL — SIGNIFICANT CHANGE UP (ref 3.8–10.5)

## 2019-05-07 PROCEDURE — 71045 X-RAY EXAM CHEST 1 VIEW: CPT | Mod: 26

## 2019-05-07 PROCEDURE — 99223 1ST HOSP IP/OBS HIGH 75: CPT

## 2019-05-07 PROCEDURE — 99238 HOSP IP/OBS DSCHRG MGMT 30/<: CPT

## 2019-05-07 RX ORDER — METFORMIN HYDROCHLORIDE 850 MG/1
1 TABLET ORAL
Qty: 90 | Refills: 0
Start: 2019-05-07 | End: 2019-06-05

## 2019-05-07 RX ORDER — MAGNESIUM SULFATE 500 MG/ML
2 VIAL (ML) INJECTION ONCE
Qty: 0 | Refills: 0 | Status: COMPLETED | OUTPATIENT
Start: 2019-05-07 | End: 2019-05-07

## 2019-05-07 RX ORDER — CLOPIDOGREL BISULFATE 75 MG/1
1 TABLET, FILM COATED ORAL
Qty: 0 | Refills: 0 | COMMUNITY

## 2019-05-07 RX ORDER — OXYCODONE HYDROCHLORIDE 5 MG/1
2 TABLET ORAL
Qty: 60 | Refills: 0
Start: 2019-05-07 | End: 2019-05-11

## 2019-05-07 RX ORDER — METFORMIN HYDROCHLORIDE 850 MG/1
500 TABLET ORAL
Qty: 0 | Refills: 0 | Status: DISCONTINUED | OUTPATIENT
Start: 2019-05-07 | End: 2019-05-07

## 2019-05-07 RX ADMIN — HEPARIN SODIUM 5000 UNIT(S): 5000 INJECTION INTRAVENOUS; SUBCUTANEOUS at 05:19

## 2019-05-07 RX ADMIN — CLOPIDOGREL BISULFATE 75 MILLIGRAM(S): 75 TABLET, FILM COATED ORAL at 13:08

## 2019-05-07 RX ADMIN — Medication 2: at 09:03

## 2019-05-07 RX ADMIN — HEPARIN SODIUM 5000 UNIT(S): 5000 INJECTION INTRAVENOUS; SUBCUTANEOUS at 13:08

## 2019-05-07 RX ADMIN — Medication 25 MILLIGRAM(S): at 05:19

## 2019-05-07 RX ADMIN — AMLODIPINE BESYLATE 5 MILLIGRAM(S): 2.5 TABLET ORAL at 05:19

## 2019-05-07 NOTE — DISCHARGE NOTE NURSING/CASE MANAGEMENT/SOCIAL WORK - NSDCPEEMAIL_GEN_ALL_CORE
[facility] for Tobacco Control email [email or web address]

## 2019-05-07 NOTE — DISCHARGE NOTE NURSING/CASE MANAGEMENT/SOCIAL WORK - NSSCNAMETXT_GEN_ALL_CORE
[facility]  initial visit will be day after discharge home. A nurse will call prior to the home visit.

## 2019-05-07 NOTE — DISCHARGE NOTE NURSING/CASE MANAGEMENT/SOCIAL WORK - NSDCPNINST_GEN_ALL_CORE
Call 911 for chest pain, and/or difficulty breathing. Report to your doctor any fever, pain not relieved by pain medications, redness/pus to incision.

## 2019-05-07 NOTE — DISCHARGE NOTE NURSING/CASE MANAGEMENT/SOCIAL WORK - NSDCFUADDAPPT_GEN_ALL_CORE_FT
See Dr Mejía in 2 weeks and bring a new chest X-ray when you come.  Watch for pus or fevers or increased redness and if noted, call Dr Mejía.

## 2019-05-07 NOTE — CONSULT NOTE ADULT - SUBJECTIVE AND OBJECTIVE BOX
Cardiology/Vascular Medicine Inpatient Consultation Note    Patient is a 69 y/o man s/p redo right VATS.  He also has a history of CAD s/p PCI x 3 last 5/2017, AAA, BPH, HTN, lung cancer, s/p lung ca S/P right Video Assisted Thoracoscopy/ Right Upper Lobe Wedge Resection on 3/26/18, denies chemo and radiation  F/u pet scan showed new right lung nodule, underwent IR bx positive for malignancy." (22 Apr 2019 10:36)          Allergies  No Known Allergies    MEDICATIONS:  amLODIPine   Tablet 5 milliGRAM(s) Oral daily  clopidogrel Tablet 75 milliGRAM(s) Oral daily  heparin  Injectable 5000 Unit(s) SubCutaneous every 8 hours  metoprolol succinate ER 25 milliGRAM(s) Oral daily  tamsulosin 0.4 milliGRAM(s) Oral at bedtime  oxyCODONE    IR 5 milliGRAM(s) Oral every 3 hours PRN  oxyCODONE    IR 10 milliGRAM(s) Oral every 3 hours PRN  atorvastatin 20 milliGRAM(s) Oral at bedtime  dextrose 40% Gel 15 Gram(s) Oral once PRN  dextrose 50% Injectable 12.5 Gram(s) IV Push once  dextrose 50% Injectable 25 Gram(s) IV Push once  dextrose 50% Injectable 25 Gram(s) IV Push once  glucagon  Injectable 1 milliGRAM(s) IntraMuscular once PRN  insulin lispro (HumaLOG) corrective regimen sliding scale   SubCutaneous three times a day before meals  insulin lispro (HumaLOG) corrective regimen sliding scale   SubCutaneous at bedtime  metFORMIN 500 milliGRAM(s) Oral two times a day  dextrose 5%. 1000 milliLiter(s) IV Continuous <Continuous>  magnesium sulfate  IVPB 2 Gram(s) IV Intermittent once      PAST MEDICAL & SURGICAL HISTORY:  Diabetes mellitus: type 2  Malignant neoplasm of lung: denies chemo and radiation  BPH (benign prostatic hyperplasia)  Smoking  Lung nodule  AAA (abdominal aortic aneurysm)  MI (myocardial infarction)  Coronary insufficiency  HLD (hyperlipidemia)  HTN (hypertension)  History of lung biopsy: 3/2019  H/O knee surgery: rt knee meniscus  History of lung surgery: S/P Rt VATS wedge on 2018  History of ear surgery  S/P primary angioplasty with coronary stent: X1 2017, X2 2009      FAMILY HISTORY:  Family history of coronary artery disease (Sibling)    SOCIAL HISTORY:    NC    REVIEW OF SYSTEMS:  As above    Appearance: NAD  HEENT:   Normal oral mucosa, PERRL, EOMI	  Cardiovascular: Normal S1 S2, No JVD, 2/6 SM  Respiratory: Decreased breath sounds bilateral bases  Psychiatry: Awake, alert  Gastrointestinal:  Soft, Non-tender, + BS	  Skin: No rashes, No ecchymoses, No cyanosis	  Neurologic: Non-focal  Extremities: Normal range of motion, No clubbing, cyanosis or edema      LABS:	 	                          13.6   6.65  )-----------( 179      ( 07 May 2019 05:58 )             41.1     05-07    140  |  103  |  19    ----------------------------<  186<H>  4.2   |  26  |  0.84    Ca    9.4      07 May 2019 05:58  Mg     1.8     05-07    	  	    < from: Xray Chest 1 View- PORTABLE-Routine (05.07.19 @ 07:28) >    EXAM:  XR CHEST PORTABLE ROUTINE 1V        PROCEDURE DATE:  May  7 2019         INTERPRETATION:  TIME OF EXAM: May 7, 2019 at 6:46 AM.    CLINICAL INFORMATION: Chest tube in place.    COMPARISON:  May 6, 2019 at 1:19 PM.    TECHNIQUE:   AP Portablechest x-ray.    INTERPRETATION:     Heart size and the mediastinum cannot be accurately evaluated on this   projection. The thoracic aorta is calcified.  Right lung postsurgical change with chain sutures again noted. Right   chest tube unchanged in position.  There is a stable small right apical pneumothorax and right subcutaneous   emphysema is not significantly changed.  Right midlung linear atelectasis again seen. Left lung is clear. No   pleural effusion.  Right humeral bone lesion which may be an infarct or enchondroma is again   noted.        IMPRESSION:  Right chest tube unchanged in position. Stable small right   apical pneumothorax and right subcutaneous emphysema.        KELLI BENDER M.D., ATTENDING RADIOLOGIST  This document has been electronically signed. May  7 2019  9:45AM                  < end of copied text > Cardiology/Vascular Medicine Inpatient Consultation Note    Patient is a 69 y/o man s/p redo right VATS.  He also has a history of CAD s/p PCI x 3 last 5/2017, AAA, BPH, HTN, lung cancer, S/P right Video Assisted Thoracoscopy/ Right Upper Lobe Wedge Resection on 3/26/18.  F/u pet scan showed new right lung nodule, underwent IR bx positive for malignancy.    Now s/p right VATS, wedge resection, BAL performed on 5/1/19.   Post-operative course c/b air leak.   Noted this AM to have two brief episodes of atrial fibrillation, one with aberrancy.  Currently in sinus rhythm without additional events noted on telemetry.  Patient reporting remaining asymptomatic from the cardiac standpoint.  Currently on clopidogrel.    CHADSVASC score of 2. Given lone episodes of atrial fibrillation at this time, can continue with antiplatelet management at this time, although if he proceeds to have additional episodes, anticoagulation should be considered.  He can f/u with me in the office for further evaluation.      Allergies  No Known Allergies    MEDICATIONS:  amLODIPine   Tablet 5 milliGRAM(s) Oral daily  clopidogrel Tablet 75 milliGRAM(s) Oral daily  heparin  Injectable 5000 Unit(s) SubCutaneous every 8 hours  metoprolol succinate ER 25 milliGRAM(s) Oral daily  tamsulosin 0.4 milliGRAM(s) Oral at bedtime  oxyCODONE    IR 5 milliGRAM(s) Oral every 3 hours PRN  oxyCODONE    IR 10 milliGRAM(s) Oral every 3 hours PRN  atorvastatin 20 milliGRAM(s) Oral at bedtime  dextrose 40% Gel 15 Gram(s) Oral once PRN  dextrose 50% Injectable 12.5 Gram(s) IV Push once  dextrose 50% Injectable 25 Gram(s) IV Push once  dextrose 50% Injectable 25 Gram(s) IV Push once  glucagon  Injectable 1 milliGRAM(s) IntraMuscular once PRN  insulin lispro (HumaLOG) corrective regimen sliding scale   SubCutaneous three times a day before meals  insulin lispro (HumaLOG) corrective regimen sliding scale   SubCutaneous at bedtime  metFORMIN 500 milliGRAM(s) Oral two times a day  dextrose 5%. 1000 milliLiter(s) IV Continuous <Continuous>  magnesium sulfate  IVPB 2 Gram(s) IV Intermittent once      PAST MEDICAL & SURGICAL HISTORY:  Diabetes mellitus: type 2  Malignant neoplasm of lung: denies chemo and radiation  BPH (benign prostatic hyperplasia)  Smoking  Lung nodule  AAA (abdominal aortic aneurysm)  MI (myocardial infarction)  Coronary insufficiency  HLD (hyperlipidemia)  HTN (hypertension)  History of lung biopsy: 3/2019  H/O knee surgery: rt knee meniscus  History of lung surgery: S/P Rt VATS wedge on 2018  History of ear surgery  S/P primary angioplasty with coronary stent: X1 2017, X2 2009      FAMILY HISTORY:  Family history of coronary artery disease (Sibling)    SOCIAL HISTORY:    NC    REVIEW OF SYSTEMS:  As above    Appearance: NAD  HEENT:   Normal oral mucosa, PERRL, EOMI	  Cardiovascular: Normal S1 S2, No JVD, 2/6 SM  Respiratory: Decreased breath sounds bilateral bases  Psychiatry: Awake, alert  Gastrointestinal:  Soft, Non-tender, + BS	  Skin: No rashes, No ecchymoses, No cyanosis	  Neurologic: Non-focal  Extremities: Normal range of motion, No clubbing, cyanosis or edema      LABS:	 	                          13.6   6.65  )-----------( 179      ( 07 May 2019 05:58 )             41.1     05-07    140  |  103  |  19    ----------------------------<  186<H>  4.2   |  26  |  0.84    Ca    9.4      07 May 2019 05:58  Mg     1.8     05-07    	  	    < from: Xray Chest 1 View- PORTABLE-Routine (05.07.19 @ 07:28) >    EXAM:  XR CHEST PORTABLE ROUTINE 1V        PROCEDURE DATE:  May  7 2019         INTERPRETATION:  TIME OF EXAM: May 7, 2019 at 6:46 AM.    CLINICAL INFORMATION: Chest tube in place.    COMPARISON:  May 6, 2019 at 1:19 PM.    TECHNIQUE:   AP Portablechest x-ray.    INTERPRETATION:     Heart size and the mediastinum cannot be accurately evaluated on this   projection. The thoracic aorta is calcified.  Right lung postsurgical change with chain sutures again noted. Right   chest tube unchanged in position.  There is a stable small right apical pneumothorax and right subcutaneous   emphysema is not significantly changed.  Right midlung linear atelectasis again seen. Left lung is clear. No   pleural effusion.  Right humeral bone lesion which may be an infarct or enchondroma is again   noted.        IMPRESSION:  Right chest tube unchanged in position. Stable small right   apical pneumothorax and right subcutaneous emphysema.        KELLI BENDER M.D., ATTENDING RADIOLOGIST  This document has been electronically signed. May  7 2019  9:45AM              < from: Transthoracic Echocardiogram (03.23.18 @ 13:05) >    Patient name: OLSZEWSKI, WALDEMAR  YOB: 1950   Age: 67 (M)   MR#: [id number]  Study Date: 3/23/2018  Location: O/PSonographer: Meek Canseco [facility]  Study quality: Technically good  Referring Physician: Danny Escobedo MD / Williams Mejía MD  Blood Pressure: 133/71 mmHg  Height: 178 cm  Weight: 97 kg  BSA: 2.2 m2  ------------------------------------------------------------------------  PROCEDURE: Transthoracic echocardiogram with 2-D, M-Mode  and complete spectral and color flow Doppler.  INDICATION: Atherosclerotic heart disease of native  coronary artery without angina pectoris (I25.10)  ------------------------------------------------------------------------  DIMENSIONS:  Dimensions:     Normal Values:  LA:     3.5 cm    2.0 -4.0 cm  Ao:     2.8 cm    2.0 - 3.8 cm  SEPTUM: 1.0 cm    0.6 - 1.2 cm  PWT:    1.0 cm    0.6 - 1.1 cm  LVIDd:  6.0 cm    3.0 - 5.6 cm  LVIDs:  4.3 cm    1.8 - 4.0 cm  Derived Variables:  LVMI: 115 g/m2  RWT: 0.33  Fractional short: 28 %  Ejection Fraction (Visual Estimate): 50-55 %  ------------------------------------------------------------------------  OBSERVATIONS:  Mitral Valve: Mitral annular calcification, otherwise  normal mitral valve. Mild-moderate mitral regurgitation.  Aortic Root: Normal size aortic root. (Ao:2.8 cm).  Aortic Valve: Calcified trileaflet aortic valve with normal  opening. No aortic valve regurgitation seen.  Left Atrium: Normal left atrium.  LA volume index = 31  cc/m2.  Left Ventricle: Endocardium not well visualized; grossly  borderline-normal left ventricular systolic function.  Estimated LVEF 50-55%. Eccentric left ventricular  hypertrophy (dilated left ventricle with normal relative  wall thickness). Moderate diastolic dysfunction (Stage II).  Right Heart: Normal right atrium. Normal right ventricular  size and function. Normal tricuspid valve. Minimal  tricuspid regurgitation. Normal pulmonic valve.  Pericardium/PleuraNormal pericardium with no pericardial  effusion.  Hemodynamic: Estimated right ventricular systolic pressure  equals 44 mm Hg, assuming right atrial pressure equals 10  mm Hg, consistent with mild pulmonary hypertension.  ------------------------------------------------------------------------  CONCLUSIONS:  1. Mitral annular calcification, otherwise normal mitral  valve. Mild-moderate mitral regurgitation.  2. Eccentric left ventricular hypertrophy (dilated left  ventricle with normal relative wall thickness).  3. Endocardium not well visualized; grossly  borderline-normal left ventricular systolic function.  Estimated LVEF 50-55%.  4. Moderate diastolic dysfunction (Stage II).  5. Normal right ventricular size and function.  6. Normal tricuspid valve. Minimal tricuspid regurgitation.  7. Estimated pulmonary artery systolic pressure equals 44  mm Hg, assuming right atrial pressure equals 10  mm Hg,  consistent with mild pulmonary hypertension.  *** No previous Echo exam.  ------------------------------------------------------------------------  Confirmed on  3/23/2018- 14:26:32 by Danny Escobedo MD, [facility],  FASLELA OSCAR  ------------------------------------------------------------------------    < end of copied text >

## 2019-05-07 NOTE — DISCHARGE NOTE NURSING/CASE MANAGEMENT/SOCIAL WORK - NSDCDPATPORTLINK_GEN_ALL_CORE
You can access the [facility] Patient Portal, offered by [facility], by registering with the following website: [email or web address]

## 2019-05-07 NOTE — CONSULT NOTE ADULT - ASSESSMENT
IMP: 69 yo male post resection of RML lung lesions with granuloma on path.  No complaints consistent with active AFB dz      PLAN: Resp isolation  Sputum for AFB x3  F/u surgical specimen AFB and cultures  Serum quantiferon    Discussed with pt thru     Thank you,  Will follow,    Silvio Cunningham MD [facility]    [phone]    (Octavio/Sabrina/Raf)
Patient is a 67 y/o man s/p redo right VATS.  He also has a history of CAD s/p PCI x 3 last 5/2017, AAA, BPH, HTN, lung cancer, S/P right Video Assisted Thoracoscopy/ Right Upper Lobe Wedge Resection on 3/26/18.  F/u pet scan showed new right lung nodule, underwent IR bx positive for malignancy.    Now s/p right VATS, wedge resection, BAL performed on 5/1/19.   Post-operative course c/b air leak.   Noted this AM to have two brief episodes of atrial fibrillation, one with aberrancy.  Currently in sinus rhythm without additional events noted on telemetry.  Patient reporting remaining asymptomatic from the cardiac standpoint.  Currently on clopidogrel.    CHADSVASC score of 2. Given lone episodes of atrial fibrillation at this time, can continue with antiplatelet management at this time, although if he proceeds to have additional episodes, anticoagulation should be considered.  He can f/u with me in the office for further evaluation.      D/W Thoracic Surgery team.

## 2019-05-07 NOTE — DISCHARGE NOTE NURSING/CASE MANAGEMENT/SOCIAL WORK - NSDCPEWEB_GEN_ALL_CORE
[facility] for Tobacco Control website --- [email or web address] website --- [email or web address]

## 2019-05-08 LAB
CULTURE - SURGICAL SITE: SIGNIFICANT CHANGE UP
SPECIMEN SOURCE: SIGNIFICANT CHANGE UP

## 2019-05-13 ENCOUNTER — EMERGENCY (EMERGENCY)
Facility: HOSPITAL | Age: 69
LOS: 1 days | Discharge: OTHER INSTITUTION | End: 2019-05-13
Attending: EMERGENCY MEDICINE
Payer: MEDICAID

## 2019-05-13 ENCOUNTER — INPATIENT (INPATIENT)
Facility: HOSPITAL | Age: 69
LOS: 4 days | Discharge: HOME | End: 2019-05-18
Attending: THORACIC SURGERY (CARDIOTHORACIC VASCULAR SURGERY) | Admitting: THORACIC SURGERY (CARDIOTHORACIC VASCULAR SURGERY)
Payer: MEDICAID

## 2019-05-13 VITALS
HEART RATE: 89 BPM | SYSTOLIC BLOOD PRESSURE: 102 MMHG | DIASTOLIC BLOOD PRESSURE: 58 MMHG | RESPIRATION RATE: 20 BRPM | TEMPERATURE: 98 F | OXYGEN SATURATION: 98 %

## 2019-05-13 VITALS
TEMPERATURE: 99 F | DIASTOLIC BLOOD PRESSURE: 84 MMHG | SYSTOLIC BLOOD PRESSURE: 137 MMHG | RESPIRATION RATE: 26 BRPM | WEIGHT: 195.99 LBS | OXYGEN SATURATION: 95 % | HEART RATE: 82 BPM | HEIGHT: 68 IN

## 2019-05-13 VITALS — HEART RATE: 105 BPM | TEMPERATURE: 100 F | RESPIRATION RATE: 16 BRPM

## 2019-05-13 DIAGNOSIS — Z98.890 OTHER SPECIFIED POSTPROCEDURAL STATES: Chronic | ICD-10-CM

## 2019-05-13 DIAGNOSIS — Z95.5 PRESENCE OF CORONARY ANGIOPLASTY IMPLANT AND GRAFT: Chronic | ICD-10-CM

## 2019-05-13 DIAGNOSIS — A41.9 SEPSIS, UNSPECIFIED ORGANISM: ICD-10-CM

## 2019-05-13 LAB
ALBUMIN SERPL ELPH-MCNC: 3.7 G/DL — SIGNIFICANT CHANGE UP (ref 3.3–5)
ALP SERPL-CCNC: 55 U/L — SIGNIFICANT CHANGE UP (ref 40–120)
ALT FLD-CCNC: 24 U/L — SIGNIFICANT CHANGE UP (ref 10–45)
ANION GAP SERPL CALC-SCNC: 19 MMOL/L — HIGH (ref 5–17)
APTT BLD: 26.3 SEC — LOW (ref 27.5–36.3)
AST SERPL-CCNC: 13 U/L — SIGNIFICANT CHANGE UP (ref 10–40)
BASE EXCESS BLDV CALC-SCNC: -0.9 MMOL/L — SIGNIFICANT CHANGE UP (ref -2–2)
BASE EXCESS BLDV CALC-SCNC: 1.5 MMOL/L — SIGNIFICANT CHANGE UP
BASOPHILS # BLD AUTO: 0 K/UL — SIGNIFICANT CHANGE UP (ref 0–0.2)
BASOPHILS NFR BLD AUTO: 0.1 % — SIGNIFICANT CHANGE UP (ref 0–2)
BILIRUB SERPL-MCNC: 0.7 MG/DL — SIGNIFICANT CHANGE UP (ref 0.2–1.2)
BLOOD GAS VENOUS - CREATININE: 0.61 MG/DL — SIGNIFICANT CHANGE UP (ref 0.5–1.3)
BLOOD GAS VENOUS - FIO2: 35 — SIGNIFICANT CHANGE UP
BUN SERPL-MCNC: 16 MG/DL — SIGNIFICANT CHANGE UP (ref 7–23)
CA-I SERPL-SCNC: 1.2 MMOL/L — SIGNIFICANT CHANGE UP (ref 1.12–1.3)
CALCIUM SERPL-MCNC: 9.3 MG/DL — SIGNIFICANT CHANGE UP (ref 8.4–10.5)
CHLORIDE BLDV-SCNC: 102 MMOL/L — SIGNIFICANT CHANGE UP (ref 96–108)
CHLORIDE BLDV-SCNC: 105 MMOL/L — SIGNIFICANT CHANGE UP (ref 96–108)
CHLORIDE SERPL-SCNC: 98 MMOL/L — SIGNIFICANT CHANGE UP (ref 96–108)
CO2 BLDV-SCNC: 26 MMOL/L — SIGNIFICANT CHANGE UP (ref 22–30)
CO2 SERPL-SCNC: 22 MMOL/L — SIGNIFICANT CHANGE UP (ref 22–31)
CREAT SERPL-MCNC: 0.86 MG/DL — SIGNIFICANT CHANGE UP (ref 0.5–1.3)
EOSINOPHIL # BLD AUTO: 0.1 K/UL — SIGNIFICANT CHANGE UP (ref 0–0.5)
EOSINOPHIL NFR BLD AUTO: 0.4 % — SIGNIFICANT CHANGE UP (ref 0–6)
GAS PNL BLDV: 133 MMOL/L — LOW (ref 136–146)
GAS PNL BLDV: 139 MMOL/L — SIGNIFICANT CHANGE UP (ref 136–145)
GAS PNL BLDV: SIGNIFICANT CHANGE UP
GAS PNL BLDV: SIGNIFICANT CHANGE UP
GLUCOSE BLDV-MCNC: 193 MG/DL — HIGH (ref 70–99)
GLUCOSE BLDV-MCNC: 209 MG/DL — HIGH (ref 70–99)
GLUCOSE SERPL-MCNC: 203 MG/DL — HIGH (ref 70–99)
HCO3 BLDV-SCNC: 25 MMOL/L — SIGNIFICANT CHANGE UP (ref 20–27)
HCO3 BLDV-SCNC: 25 MMOL/L — SIGNIFICANT CHANGE UP (ref 21–29)
HCT VFR BLD CALC: 35.3 % — LOW (ref 39–50)
HCT VFR BLD CALC: 43.6 % — SIGNIFICANT CHANGE UP (ref 39–50)
HCT VFR BLDA CALC: 44 % — SIGNIFICANT CHANGE UP (ref 39–50)
HCT VFR BLDV CALC: 38.9 % — LOW (ref 39–51)
HGB BLD CALC-MCNC: 14.2 G/DL — SIGNIFICANT CHANGE UP (ref 13–17)
HGB BLD-MCNC: 11.8 G/DL — LOW (ref 13–17)
HGB BLD-MCNC: 14.2 G/DL — SIGNIFICANT CHANGE UP (ref 13–17)
HGB BLDV-MCNC: 12.6 G/DL — LOW (ref 13–17)
INR BLD: 1.31 RATIO — HIGH (ref 0.88–1.16)
LACTATE BLDV-MCNC: 3.7 MMOL/L — HIGH (ref 0.7–2)
LACTATE BLDV-MCNC: 4 MMOL/L — CRITICAL HIGH (ref 0.5–2)
LYMPHOCYTES # BLD AUTO: 1.3 K/UL — SIGNIFICANT CHANGE UP (ref 1–3.3)
LYMPHOCYTES # BLD AUTO: 9.6 % — LOW (ref 13–44)
MCHC RBC-ENTMCNC: 31.6 PG — SIGNIFICANT CHANGE UP (ref 27–34)
MCHC RBC-ENTMCNC: 31.6 PG — SIGNIFICANT CHANGE UP (ref 27–34)
MCHC RBC-ENTMCNC: 32.7 GM/DL — SIGNIFICANT CHANGE UP (ref 32–36)
MCHC RBC-ENTMCNC: 33.4 % — SIGNIFICANT CHANGE UP (ref 32–36)
MCV RBC AUTO: 94.6 FL — SIGNIFICANT CHANGE UP (ref 80–100)
MCV RBC AUTO: 96.8 FL — SIGNIFICANT CHANGE UP (ref 80–100)
MONOCYTES # BLD AUTO: 0.8 K/UL — SIGNIFICANT CHANGE UP (ref 0–0.9)
MONOCYTES NFR BLD AUTO: 5.8 % — SIGNIFICANT CHANGE UP (ref 2–14)
NEUTROPHILS # BLD AUTO: 11.7 K/UL — HIGH (ref 1.8–7.4)
NEUTROPHILS NFR BLD AUTO: 84.2 % — HIGH (ref 43–77)
NRBC # FLD: 0 K/UL — SIGNIFICANT CHANGE UP (ref 0–0)
PCO2 BLDV: 44 MMHG — SIGNIFICANT CHANGE UP (ref 41–51)
PCO2 BLDV: 48 MMHG — SIGNIFICANT CHANGE UP (ref 35–50)
PH BLDV: 7.33 — LOW (ref 7.35–7.45)
PH BLDV: 7.39 PH — SIGNIFICANT CHANGE UP (ref 7.32–7.43)
PLATELET # BLD AUTO: 225 K/UL — SIGNIFICANT CHANGE UP (ref 150–400)
PLATELET # BLD AUTO: 246 K/UL — SIGNIFICANT CHANGE UP (ref 150–400)
PMV BLD: 10.7 FL — SIGNIFICANT CHANGE UP (ref 7–13)
PO2 BLDV: 27 MMHG — SIGNIFICANT CHANGE UP (ref 25–45)
PO2 BLDV: 32 MMHG — LOW (ref 35–40)
POTASSIUM BLDV-SCNC: 3.4 MMOL/L — LOW (ref 3.5–5.3)
POTASSIUM BLDV-SCNC: 3.4 MMOL/L — SIGNIFICANT CHANGE UP (ref 3.4–4.5)
POTASSIUM SERPL-MCNC: 3.7 MMOL/L — SIGNIFICANT CHANGE UP (ref 3.5–5.3)
POTASSIUM SERPL-SCNC: 3.7 MMOL/L — SIGNIFICANT CHANGE UP (ref 3.5–5.3)
PROT SERPL-MCNC: 7.1 G/DL — SIGNIFICANT CHANGE UP (ref 6–8.3)
PROTHROM AB SERPL-ACNC: 15.1 SEC — HIGH (ref 10–12.9)
RAPID RVP RESULT: SIGNIFICANT CHANGE UP
RBC # BLD: 3.73 M/UL — LOW (ref 4.2–5.8)
RBC # BLD: 4.51 M/UL — SIGNIFICANT CHANGE UP (ref 4.2–5.8)
RBC # FLD: 12.8 % — SIGNIFICANT CHANGE UP (ref 10.3–14.5)
RBC # FLD: 13.5 % — SIGNIFICANT CHANGE UP (ref 10.3–14.5)
SAO2 % BLDV: 41 % — LOW (ref 67–88)
SAO2 % BLDV: 58.1 % — LOW (ref 60–85)
SODIUM SERPL-SCNC: 139 MMOL/L — SIGNIFICANT CHANGE UP (ref 135–145)
WBC # BLD: 13.9 K/UL — HIGH (ref 3.8–10.5)
WBC # BLD: 15.52 K/UL — HIGH (ref 3.8–10.5)
WBC # FLD AUTO: 13.9 K/UL — HIGH (ref 3.8–10.5)
WBC # FLD AUTO: 15.52 K/UL — HIGH (ref 3.8–10.5)

## 2019-05-13 PROCEDURE — 99233 SBSQ HOSP IP/OBS HIGH 50: CPT

## 2019-05-13 PROCEDURE — 87040 BLOOD CULTURE FOR BACTERIA: CPT

## 2019-05-13 PROCEDURE — 82330 ASSAY OF CALCIUM: CPT

## 2019-05-13 PROCEDURE — 87798 DETECT AGENT NOS DNA AMP: CPT

## 2019-05-13 PROCEDURE — 82803 BLOOD GASES ANY COMBINATION: CPT

## 2019-05-13 PROCEDURE — 96375 TX/PRO/DX INJ NEW DRUG ADDON: CPT | Mod: XU

## 2019-05-13 PROCEDURE — 82435 ASSAY OF BLOOD CHLORIDE: CPT

## 2019-05-13 PROCEDURE — 71045 X-RAY EXAM CHEST 1 VIEW: CPT | Mod: 26

## 2019-05-13 PROCEDURE — 80053 COMPREHEN METABOLIC PANEL: CPT

## 2019-05-13 PROCEDURE — 85027 COMPLETE CBC AUTOMATED: CPT

## 2019-05-13 PROCEDURE — 93005 ELECTROCARDIOGRAM TRACING: CPT

## 2019-05-13 PROCEDURE — 87633 RESP VIRUS 12-25 TARGETS: CPT

## 2019-05-13 PROCEDURE — 83605 ASSAY OF LACTIC ACID: CPT

## 2019-05-13 PROCEDURE — 84132 ASSAY OF SERUM POTASSIUM: CPT

## 2019-05-13 PROCEDURE — 96374 THER/PROPH/DIAG INJ IV PUSH: CPT | Mod: XU

## 2019-05-13 PROCEDURE — 99285 EMERGENCY DEPT VISIT HI MDM: CPT | Mod: 25

## 2019-05-13 PROCEDURE — 71275 CT ANGIOGRAPHY CHEST: CPT

## 2019-05-13 PROCEDURE — 84295 ASSAY OF SERUM SODIUM: CPT

## 2019-05-13 PROCEDURE — 85610 PROTHROMBIN TIME: CPT

## 2019-05-13 PROCEDURE — 99291 CRITICAL CARE FIRST HOUR: CPT

## 2019-05-13 PROCEDURE — 82565 ASSAY OF CREATININE: CPT

## 2019-05-13 PROCEDURE — 71045 X-RAY EXAM CHEST 1 VIEW: CPT

## 2019-05-13 PROCEDURE — 85730 THROMBOPLASTIN TIME PARTIAL: CPT

## 2019-05-13 PROCEDURE — 87581 M.PNEUMON DNA AMP PROBE: CPT

## 2019-05-13 PROCEDURE — 82947 ASSAY GLUCOSE BLOOD QUANT: CPT

## 2019-05-13 PROCEDURE — 71275 CT ANGIOGRAPHY CHEST: CPT | Mod: 26

## 2019-05-13 PROCEDURE — 83735 ASSAY OF MAGNESIUM: CPT

## 2019-05-13 PROCEDURE — 87486 CHLMYD PNEUM DNA AMP PROBE: CPT

## 2019-05-13 PROCEDURE — 99222 1ST HOSP IP/OBS MODERATE 55: CPT

## 2019-05-13 PROCEDURE — 85014 HEMATOCRIT: CPT

## 2019-05-13 RX ORDER — PIPERACILLIN AND TAZOBACTAM 4; .5 G/20ML; G/20ML
3.38 INJECTION, POWDER, LYOPHILIZED, FOR SOLUTION INTRAVENOUS EVERY 8 HOURS
Refills: 0 | Status: DISCONTINUED | OUTPATIENT
Start: 2019-05-13 | End: 2019-05-17

## 2019-05-13 RX ORDER — SODIUM CHLORIDE 9 MG/ML
2000 INJECTION, SOLUTION INTRAVENOUS ONCE
Refills: 0 | Status: COMPLETED | OUTPATIENT
Start: 2019-05-13 | End: 2019-05-13

## 2019-05-13 RX ORDER — VANCOMYCIN HCL 1 G
1000 VIAL (EA) INTRAVENOUS ONCE
Refills: 0 | Status: COMPLETED | OUTPATIENT
Start: 2019-05-13 | End: 2019-05-13

## 2019-05-13 RX ORDER — VANCOMYCIN HCL 1 G
1000 VIAL (EA) INTRAVENOUS EVERY 12 HOURS
Refills: 0 | Status: DISCONTINUED | OUTPATIENT
Start: 2019-05-14 | End: 2019-05-15

## 2019-05-13 RX ORDER — AZITHROMYCIN 500 MG/1
500 TABLET, FILM COATED ORAL ONCE
Refills: 0 | Status: COMPLETED | OUTPATIENT
Start: 2019-05-13 | End: 2019-05-13

## 2019-05-13 RX ORDER — ACETAMINOPHEN 500 MG
975 TABLET ORAL ONCE
Refills: 0 | Status: COMPLETED | OUTPATIENT
Start: 2019-05-13 | End: 2019-05-13

## 2019-05-13 RX ORDER — MAGNESIUM SULFATE 500 MG/ML
2 VIAL (ML) INJECTION ONCE
Refills: 0 | Status: COMPLETED | OUTPATIENT
Start: 2019-05-13 | End: 2019-05-14

## 2019-05-13 RX ORDER — PIPERACILLIN AND TAZOBACTAM 4; .5 G/20ML; G/20ML
3.38 INJECTION, POWDER, LYOPHILIZED, FOR SOLUTION INTRAVENOUS ONCE
Refills: 0 | Status: COMPLETED | OUTPATIENT
Start: 2019-05-13 | End: 2019-05-13

## 2019-05-13 RX ORDER — SODIUM CHLORIDE 9 MG/ML
1000 INJECTION, SOLUTION INTRAVENOUS
Refills: 0 | Status: DISCONTINUED | OUTPATIENT
Start: 2019-05-13 | End: 2019-05-14

## 2019-05-13 RX ADMIN — PIPERACILLIN AND TAZOBACTAM 200 GRAM(S): 4; .5 INJECTION, POWDER, LYOPHILIZED, FOR SOLUTION INTRAVENOUS at 18:31

## 2019-05-13 RX ADMIN — AZITHROMYCIN 250 MILLIGRAM(S): 500 TABLET, FILM COATED ORAL at 19:45

## 2019-05-13 RX ADMIN — Medication 975 MILLIGRAM(S): at 18:37

## 2019-05-13 RX ADMIN — SODIUM CHLORIDE 2000 MILLILITER(S): 9 INJECTION, SOLUTION INTRAVENOUS at 18:30

## 2019-05-13 RX ADMIN — Medication 250 MILLIGRAM(S): at 21:14

## 2019-05-13 NOTE — ED ADULT NURSE NOTE - OBJECTIVE STATEMENT
68 y/o Polish speaking Male presenting to the ED by EMS from home, A&Ox3, complaining of difficulty breathing at pain at chest tube site.  offered and declined at this time, okay for daughter to translate at this time. Pt presents with chest tube on right side placed on May 1st post lung cancer/ lobe resection. Pt daughter reports that the chest tube has not been draining as much lately and that it appears red, swollen, draining pus around the site and is causing extreme pain. Pt reports difficulty breathing, pt sating 94% on room air, placed on 3L of O2 NC, sating 96%. Pt placed on cardiac monitor showing sinus tachycardia at this time to the 100's. Lung sounds clear on the right side, crackles noted on left lower lobe upon auscultation. Chest tube site appears swollen, warm to touch, tender to touch and erythema and pus noted at the site. 8mL of tan drainage noted. Bruises noted to the abdomen, pt daughter reports this is from insulin shots. Pt on Plavix. Skin warm to touch. Pt febrile at 102.0 rectally. Pt daughter extremely upset that pt was brought to [facility] and not [facility], despite multiple requests to EMS to go to [facility] ([facility] was on diversion at this time). Pt was declining care upon arrival but accepting care from [facility] ED at this time. ANM made aware immediately. Pt undressed and put in gown. Pt and family educated on plan of care at this time. MD Mejía to be notified of pt arrival to [facility] ED. Safety and comfort measures provided. Call bell within reach. Bed in lowest position. Side rails up for safety.

## 2019-05-13 NOTE — ED PROVIDER NOTE - PSH
H/O knee surgery  rt knee meniscus  History of ear surgery    History of lung biopsy  3/2019  History of lung surgery  S/P Rt VATS wedge on 2018  S/P primary angioplasty with coronary stent  X1 2017, X2 2009

## 2019-05-13 NOTE — ED PROVIDER NOTE - CLINICAL SUMMARY MEDICAL DECISION MAKING FREE TEXT BOX
68 yo M hx lung cancer with chest tube in place presenting with chest pain, SOB, cough, concern for sepsis 2/2 pneumonia (? c/b empyema) will tx vanc/zosyn/fluids/tylenol, obtain baseline labs, CT, call thoracic surgery, tba vs tx 68 yo M hx lung cancer with chest tube in place presenting with chest pain, SOB, cough, concern for sepsis 2/2 pneumonia (? c/b empyema); will tx vanc/zosyn/fluids/tylenol, obtain baseline labs, CT, call thoracic surgery, tba vs tx

## 2019-05-13 NOTE — ED PROVIDER NOTE - CRITICAL CARE PROVIDED
consultation with other physicians/consult w/ pt's family directly relating to pts condition/additional history taking/documentation/interpretation of diagnostic studies/direct patient care (not related to procedure)

## 2019-05-13 NOTE — ED PROVIDER NOTE - PHYSICAL EXAMINATION
VITALS: reviewed  GEN: NAD, A & O x 3  HEAD/EYES: NCAT, EOMI, anicteric sclerae,   ENT: mucus membranes moist, oropharynx WNL, trachea midline, no JVD  RESP: L CTA, decreased BS R, CT in place with surrounding erythema to R chest without purulent drainage,   CV: heart with reg rhythm S1, S2, distal pulses intact and symmetric bilaterally  ABDOMEN: normoactive bowel sounds, soft, nondistended, nontender, no palpable masses  : no CVAT  MSK: extremities atraumatic and nontender, no edema, no asymmetry.  SKIN: warm, dry, no rash, no bruising, no cyanosis. color appropriate for ethnicity  NEURO: alert, mentating appropriately, no facial asymmetry.   PSYCH: Affect appropriate

## 2019-05-13 NOTE — ED ADULT NURSE REASSESSMENT NOTE - NS ED NURSE REASSESS COMMENT FT1
MD Carbajal reports pt. stable enough for transfer. ANM at bedside. Daughter requests taking pt. to [facility] herself bc she does "not want to pay for another EMS transport." MD Carbajal and ANM at bedside. Daughter agrees to an EMS transfer to [facility]. Transfer paperwork to be done. CT-scan d/c.

## 2019-05-13 NOTE — ED PROVIDER NOTE - CARE PLAN
Principal Discharge DX:	Sepsis Principal Discharge DX:	Sepsis  Secondary Diagnosis:	Pneumonia  Secondary Diagnosis:	Tachycardia  Secondary Diagnosis:	Tachypnea  Secondary Diagnosis:	Weakness

## 2019-05-13 NOTE — H&P ADULT - NSHPREVIEWOFSYSTEMS_GEN_ALL_CORE
CONST: + fevers, no chills, no trauma  EYES: no pain, no visual disturbances  ENT: no sore throat, no epistaxis, no rhinorrhea, no hearing changes  CV: No SSCP, no palpitations, no orthopnea, no extremity pain or swelling  RESP: + shortness of breath, + cough, + pain at his chest tube site; no sputum, no pleurisy, no wheezing  ABD: no abdominal pain, no nausea, no vomiting, no diarrhea, no black or bloody stool  : no dysuria, no hematuria, no frequency, no urgency  MSK: no back pain, no neck pain, no extremity pain  NEURO: no headache, no sensory disturbances, no focal weakness, no dizziness  HEME: no easy bleeding or bruising  SKIN: no diaphoresis, no rash

## 2019-05-13 NOTE — H&P ADULT - NSHPSOCIALHISTORY_GEN_ALL_CORE
· Marital Status	  2 children  · Occupation	retired- childress  · Lives With	children; spouse     Substance Use History:  · Substance Use	caffeine  denies EtOH and drug abuse  · Caffeine Type	coffee; tea  · Caffeine Amount/Frequency	1-2 cups/cans per day     Tobacco Usage:  · Tobacco Usage: Former smoker  · Tobacco Type: cigarettes  · Last Tobacco Use (dd-mmm-yy): 01-Jan-2018  · Number of Packs per Day: 1  · Number of yrs: 40  · Pack yrs: 40

## 2019-05-13 NOTE — H&P ADULT - NSHPLABSRESULTS_GEN_ALL_CORE
11.8   15.52 )-----------( 225      ( 13 May 2019 23:15 )             35.3     05-13    137  |  101  |  13  ----------------------------<  212<H>  3.9   |  23  |  0.79    Ca    8.7      13 May 2019 23:15  Mg     1.3     05-13    TPro  6.2  /  Alb  3.3  /  TBili  0.6  /  DBili  x   /  AST  12  /  ALT  16  /  AlkPhos  46  05-13    PT/INR - ( 13 May 2019 18:32 )   PT: 15.1 sec;   INR: 1.31 ratio    PTT - ( 13 May 2019 18:32 )  PTT:26.3 sec    CT chest: Expected post-op changes- small R HydroPTX, R chest tube in place

## 2019-05-13 NOTE — H&P ADULT - HISTORY OF PRESENT ILLNESS
This 70 y/o male is POD 12 s/p FB, Robotic Assisted RVATS, RML wedge x2, BAL and  s/p Robotic RVATS, RUL lobectomy and RML wedge  Pathology from the original surgery showed adenocarcinoma.  The more recent surgery showed necrotizing granulomas which were negative for AFB.  He was last discharged on 05/07/19 with a chest tube to a pneumostat due to a persistent air leak.  He presented to [facility] this evening from home with complaints of SOB, cough, & worsening pain for several hours. He stated that pain at his chest tube site made it difficult for him to breathe.  As per the pt's daughter, the chest tube drainage has recently decreased and the chest tube site has begun to appear more red & swollen with purulent drainage. The pt was found to be tachycardic and febrile with a rectal temperature of 102F and a WBC of 13.9. He was transferred to [facility] with a concern for sepsis.  A CT scan of the chest was done prior to his transport and it was negative for PE and only showed expected post-op changes with a small R Hydro-PTX and the chest tube in place.  Here at [facility], the pt is comfortable and without complaints.  He states that he feels "good".

## 2019-05-13 NOTE — ED ADULT NURSE REASSESSMENT NOTE - NS ED NURSE REASSESS COMMENT FT1
Pt and daughter spoke to surgeon Alfonzo over the phone at [facility], Pt and daughter informed that MD Mejía requests they be transferred to [facility]. Pt daughter does not want him to be transferred by EMS because she does not want to pay again for him to get to [facility]. MD Carbajal aware, possible AMA pending. Safety and comfort measures maintained.

## 2019-05-13 NOTE — ED ADULT NURSE REASSESSMENT NOTE - NS ED NURSE REASSESS COMMENT FT1
War room called and informed RN that pt went into SVT and then broke it on his own. Print out handed to MD Carbajal. MD Carbajal aware, as per MD, no RN intervention needed at this time.

## 2019-05-13 NOTE — ED PROVIDER NOTE - NS ED ROS FT
CONST: + fevers, no chills, no trauma  EYES: no pain, no visual disturbances  ENT: no sore throat, no epistaxis, no rhinorrhea, no hearing changes  CV: + chest pain, no palpitations, no orthopnea, no extremity pain or swelling  RESP: + shortness of breath, + cough, no sputum, no pleurisy, no wheezing  ABD: no abdominal pain, no nausea, no vomiting, no diarrhea, no black or bloody stool  : no dysuria, no hematuria, no frequency, no urgency  MSK: no back pain, no neck pain, no extremity pain  NEURO: no headache, no sensory disturbances, no focal weakness, no dizziness  HEME: no easy bleeding or bruising  SKIN: no diaphoresis, no rash

## 2019-05-13 NOTE — ED PROVIDER NOTE - OBJECTIVE STATEMENT
68 yo M hx R VATS and robitic R middle lobe wedge resection x 2, BAL, lung cancer, discharged 05/07/19 with chest tube to pneumostat, presenting with complaints of SOB, and chest pain, cough, worsening pain this evening. As per daughter, they were going to [facility], but father had increased pain, had to call ambulance and arrived to [facility] ED 2/2 to divergence at [facility]. 68 yo M hx R VATS and robitic R middle lobe wedge resection x 2, BAL, lung cancer, discharged 05/07/19 with chest tube to pneumostat, presenting with complaints of SOB, and chest pain, cough, worsening pain this evening.  Also noting pneumostat is draining less and there seems to be more draining from the incision site itself.  Incision site noted to be red with ?purulent? dc last night into this AM.  Daughter called EMS when pt had difficulty breathing/increased pain, came to [facility] ED against daughter/patient's wishes 2/2 to divergence at [facility]

## 2019-05-13 NOTE — ED ADULT NURSE NOTE - INTERVENTIONS DEFINITIONS
Saman,     
Non-slip footwear when patient is off stretcher/Physically safe environment: no spills, clutter or unnecessary equipment/Monitor for mental status changes and reorient to person, place, and time/[place] to call system/Stretcher in lowest position, wheels locked, appropriate side rails in place/Monitor gait and stability/Call bell, personal items and telephone within reach/Provide visual cue, wrist band, yellow gown, etc.

## 2019-05-13 NOTE — ED PROVIDER NOTE - PMH
AAA (abdominal aortic aneurysm)    BPH (benign prostatic hyperplasia)    Coronary insufficiency    Diabetes mellitus  type 2  HLD (hyperlipidemia)    HTN (hypertension)    Lung nodule    Malignant neoplasm of lung  denies chemo and radiation  MI (myocardial infarction)    Smoking

## 2019-05-13 NOTE — H&P ADULT - NSHPPHYSICALEXAM_GEN_ALL_CORE
Vital Signs:  Vital Signs Last 24 Hrs  T(C): 37.3 (05-13-19 @ 22:35), Max: 38.9 (05-13-19 @ 18:04)  T(F): 99.1 (05-13-19 @ 22:35), Max: 102 (05-13-19 @ 18:04)  HR: 76 (05-13-19 @ 23:00) (73 - 111)  BP: 125/64 (05-13-19 @ 23:00) (102/41 - 137/84)  RR: 27 (05-13-19 @ 23:00) (16 - 30)  SpO2: 95% (05-13-19 @ 23:00) (94% - 98%) on (O2)    GEN: NAD, A & O x 3  HEAD/EYES: NCAT, EOMI, anicteric sclerae,   ENT: mucus membranes moist, oropharynx WNL, trachea midline, no JVD  RESP: L CTA, decreased BS R, R Chest Tube in place with mild surrounding erythema; No purulent drainage; Minimal serous drainage around tube   CV: heart with reg rhythm S1, S2, distal pulses intact and symmetric bilaterally  ABDOMEN: normoactive bowel sounds, soft, nondistended, nontender, no palpable masses  : no CVAT  MSK: extremities atraumatic and nontender, no edema, no asymmetry.  SKIN: warm, dry, no rash, +bruising at SQHeparin sites, ? LUE forearm lipoma  NEURO: alert, mentating appropriately, no facial asymmetry.   PSYCH: Affect appropriate

## 2019-05-13 NOTE — H&P ADULT - NSICDXPASTMEDICALHX_GEN_ALL_CORE_FT
PAST MEDICAL HISTORY:  AAA (abdominal aortic aneurysm)     BPH (benign prostatic hyperplasia)     Coronary insufficiency     Diabetes mellitus type 2    HLD (hyperlipidemia)     HTN (hypertension)     Lung nodule Adenocarcinoma; Necrotizing granulomas    Malignant neoplasm of lung denies chemo and radiation    MI (myocardial infarction)     Smoking

## 2019-05-13 NOTE — H&P ADULT - NSICDXPASTSURGICALHX_GEN_ALL_CORE_FT
PAST SURGICAL HISTORY:  H/O knee surgery rt knee meniscus    History of ear surgery     History of lung biopsy 3/2019    History of lung surgery S/P Rt VATS wedge on 2018    S/P primary angioplasty with coronary stent X1 2017, X2 2009

## 2019-05-13 NOTE — ED PROVIDER NOTE - CHIEF COMPLAINT
The patient is a 69y Male complaining of difficulty breathing. The patient is a 69y Male complaining of difficulty breathing.  Daughter is translating at patient's request;  service declined by pt

## 2019-05-14 ENCOUNTER — APPOINTMENT (OUTPATIENT)
Dept: THORACIC SURGERY | Facility: CLINIC | Age: 69
End: 2019-05-14

## 2019-05-14 LAB
ALBUMIN SERPL ELPH-MCNC: 3.1 G/DL — LOW (ref 3.3–5)
ALBUMIN SERPL ELPH-MCNC: 3.3 G/DL — SIGNIFICANT CHANGE UP (ref 3.3–5)
ALP SERPL-CCNC: 46 U/L — SIGNIFICANT CHANGE UP (ref 40–120)
ALP SERPL-CCNC: 47 U/L — SIGNIFICANT CHANGE UP (ref 40–120)
ALT FLD-CCNC: 15 U/L — SIGNIFICANT CHANGE UP (ref 4–41)
ALT FLD-CCNC: 16 U/L — SIGNIFICANT CHANGE UP (ref 4–41)
ANION GAP SERPL CALC-SCNC: 11 MMO/L — SIGNIFICANT CHANGE UP (ref 7–14)
ANION GAP SERPL CALC-SCNC: 13 MMO/L — SIGNIFICANT CHANGE UP (ref 7–14)
APPEARANCE UR: CLEAR — SIGNIFICANT CHANGE UP
AST SERPL-CCNC: 12 U/L — SIGNIFICANT CHANGE UP (ref 4–40)
AST SERPL-CCNC: 13 U/L — SIGNIFICANT CHANGE UP (ref 4–40)
BASE EXCESS BLDV CALC-SCNC: 3.8 MMOL/L — SIGNIFICANT CHANGE UP
BILIRUB SERPL-MCNC: 0.6 MG/DL — SIGNIFICANT CHANGE UP (ref 0.2–1.2)
BILIRUB SERPL-MCNC: 0.8 MG/DL — SIGNIFICANT CHANGE UP (ref 0.2–1.2)
BILIRUB UR-MCNC: NEGATIVE — SIGNIFICANT CHANGE UP
BLD GP AB SCN SERPL QL: NEGATIVE — SIGNIFICANT CHANGE UP
BLOOD GAS VENOUS - CREATININE: 0.72 MG/DL — SIGNIFICANT CHANGE UP (ref 0.5–1.3)
BLOOD UR QL VISUAL: NEGATIVE — SIGNIFICANT CHANGE UP
BUN SERPL-MCNC: 10 MG/DL — SIGNIFICANT CHANGE UP (ref 7–23)
BUN SERPL-MCNC: 13 MG/DL — SIGNIFICANT CHANGE UP (ref 7–23)
CALCIUM SERPL-MCNC: 8.5 MG/DL — SIGNIFICANT CHANGE UP (ref 8.4–10.5)
CALCIUM SERPL-MCNC: 8.7 MG/DL — SIGNIFICANT CHANGE UP (ref 8.4–10.5)
CHLORIDE BLDV-SCNC: 106 MMOL/L — SIGNIFICANT CHANGE UP (ref 96–108)
CHLORIDE SERPL-SCNC: 101 MMOL/L — SIGNIFICANT CHANGE UP (ref 98–107)
CHLORIDE SERPL-SCNC: 104 MMOL/L — SIGNIFICANT CHANGE UP (ref 98–107)
CO2 SERPL-SCNC: 23 MMOL/L — SIGNIFICANT CHANGE UP (ref 22–31)
CO2 SERPL-SCNC: 24 MMOL/L — SIGNIFICANT CHANGE UP (ref 22–31)
COLOR SPEC: YELLOW — SIGNIFICANT CHANGE UP
CREAT SERPL-MCNC: 0.66 MG/DL — SIGNIFICANT CHANGE UP (ref 0.5–1.3)
CREAT SERPL-MCNC: 0.79 MG/DL — SIGNIFICANT CHANGE UP (ref 0.5–1.3)
GAS PNL BLDV: 137 MMOL/L — SIGNIFICANT CHANGE UP (ref 136–146)
GLUCOSE BLDV-MCNC: 166 MG/DL — HIGH (ref 70–99)
GLUCOSE SERPL-MCNC: 168 MG/DL — HIGH (ref 70–99)
GLUCOSE SERPL-MCNC: 212 MG/DL — HIGH (ref 70–99)
GLUCOSE UR-MCNC: NEGATIVE — SIGNIFICANT CHANGE UP
GRAM STN FLD: SIGNIFICANT CHANGE UP
HCO3 BLDV-SCNC: 27 MMOL/L — SIGNIFICANT CHANGE UP (ref 20–27)
HCT VFR BLD CALC: 37.6 % — LOW (ref 39–50)
HCT VFR BLDV CALC: 39.5 % — SIGNIFICANT CHANGE UP (ref 39–51)
HCV AB S/CO SERPL IA: 0.13 S/CO — SIGNIFICANT CHANGE UP (ref 0–0.99)
HCV AB SERPL-IMP: SIGNIFICANT CHANGE UP
HGB BLD-MCNC: 12.7 G/DL — LOW (ref 13–17)
HGB BLDV-MCNC: 12.8 G/DL — LOW (ref 13–17)
KETONES UR-MCNC: NEGATIVE — SIGNIFICANT CHANGE UP
LACTATE BLDV-MCNC: 1.3 MMOL/L — SIGNIFICANT CHANGE UP (ref 0.5–2)
LEUKOCYTE ESTERASE UR-ACNC: NEGATIVE — SIGNIFICANT CHANGE UP
MCHC RBC-ENTMCNC: 31.3 PG — SIGNIFICANT CHANGE UP (ref 27–34)
MCHC RBC-ENTMCNC: 33.8 % — SIGNIFICANT CHANGE UP (ref 32–36)
MCV RBC AUTO: 92.6 FL — SIGNIFICANT CHANGE UP (ref 80–100)
NITRITE UR-MCNC: NEGATIVE — SIGNIFICANT CHANGE UP
NRBC # FLD: 0 K/UL — SIGNIFICANT CHANGE UP (ref 0–0)
PCO2 BLDV: 42 MMHG — SIGNIFICANT CHANGE UP (ref 41–51)
PH BLDV: 7.44 PH — HIGH (ref 7.32–7.43)
PH UR: 6 — SIGNIFICANT CHANGE UP (ref 5–8)
PLATELET # BLD AUTO: 228 K/UL — SIGNIFICANT CHANGE UP (ref 150–400)
PMV BLD: 10.3 FL — SIGNIFICANT CHANGE UP (ref 7–13)
PO2 BLDV: 42 MMHG — HIGH (ref 35–40)
POTASSIUM BLDV-SCNC: 3.3 MMOL/L — LOW (ref 3.4–4.5)
POTASSIUM SERPL-MCNC: 3.6 MMOL/L — SIGNIFICANT CHANGE UP (ref 3.5–5.3)
POTASSIUM SERPL-MCNC: 3.9 MMOL/L — SIGNIFICANT CHANGE UP (ref 3.5–5.3)
POTASSIUM SERPL-SCNC: 3.6 MMOL/L — SIGNIFICANT CHANGE UP (ref 3.5–5.3)
POTASSIUM SERPL-SCNC: 3.9 MMOL/L — SIGNIFICANT CHANGE UP (ref 3.5–5.3)
PROT SERPL-MCNC: 6.2 G/DL — SIGNIFICANT CHANGE UP (ref 6–8.3)
PROT SERPL-MCNC: 6.2 G/DL — SIGNIFICANT CHANGE UP (ref 6–8.3)
PROT UR-MCNC: 10 — SIGNIFICANT CHANGE UP
RBC # BLD: 4.06 M/UL — LOW (ref 4.2–5.8)
RBC # FLD: 13.4 % — SIGNIFICANT CHANGE UP (ref 10.3–14.5)
RH IG SCN BLD-IMP: POSITIVE — SIGNIFICANT CHANGE UP
SAO2 % BLDV: 76.6 % — SIGNIFICANT CHANGE UP (ref 60–85)
SODIUM SERPL-SCNC: 137 MMOL/L — SIGNIFICANT CHANGE UP (ref 135–145)
SODIUM SERPL-SCNC: 139 MMOL/L — SIGNIFICANT CHANGE UP (ref 135–145)
SP GR SPEC: 1.02 — SIGNIFICANT CHANGE UP (ref 1–1.04)
SPECIMEN SOURCE: SIGNIFICANT CHANGE UP
UROBILINOGEN FLD QL: HIGH
WBC # BLD: 12.6 K/UL — HIGH (ref 3.8–10.5)
WBC # FLD AUTO: 12.6 K/UL — HIGH (ref 3.8–10.5)

## 2019-05-14 PROCEDURE — 71045 X-RAY EXAM CHEST 1 VIEW: CPT | Mod: 26

## 2019-05-14 PROCEDURE — 99233 SBSQ HOSP IP/OBS HIGH 50: CPT

## 2019-05-14 RX ORDER — GLUCAGON INJECTION, SOLUTION 0.5 MG/.1ML
1 INJECTION, SOLUTION SUBCUTANEOUS ONCE
Refills: 0 | Status: DISCONTINUED | OUTPATIENT
Start: 2019-05-14 | End: 2019-05-18

## 2019-05-14 RX ORDER — CLOPIDOGREL BISULFATE 75 MG/1
75 TABLET, FILM COATED ORAL DAILY
Refills: 0 | Status: DISCONTINUED | OUTPATIENT
Start: 2019-05-14 | End: 2019-05-18

## 2019-05-14 RX ORDER — ATORVASTATIN CALCIUM 80 MG/1
20 TABLET, FILM COATED ORAL DAILY
Refills: 0 | Status: DISCONTINUED | OUTPATIENT
Start: 2019-05-14 | End: 2019-05-18

## 2019-05-14 RX ORDER — ASPIRIN/CALCIUM CARB/MAGNESIUM 324 MG
81 TABLET ORAL DAILY
Refills: 0 | Status: DISCONTINUED | OUTPATIENT
Start: 2019-05-14 | End: 2019-05-18

## 2019-05-14 RX ORDER — OXYCODONE HYDROCHLORIDE 5 MG/1
10 TABLET ORAL EVERY 4 HOURS
Refills: 0 | Status: DISCONTINUED | OUTPATIENT
Start: 2019-05-14 | End: 2019-05-18

## 2019-05-14 RX ORDER — INSULIN LISPRO 100/ML
VIAL (ML) SUBCUTANEOUS EVERY 6 HOURS
Refills: 0 | Status: DISCONTINUED | OUTPATIENT
Start: 2019-05-14 | End: 2019-05-16

## 2019-05-14 RX ORDER — DEXTROSE 50 % IN WATER 50 %
12.5 SYRINGE (ML) INTRAVENOUS ONCE
Refills: 0 | Status: DISCONTINUED | OUTPATIENT
Start: 2019-05-14 | End: 2019-05-18

## 2019-05-14 RX ORDER — ERYTHROMYCIN BASE 5 MG/GRAM
1 OINTMENT (GRAM) OPHTHALMIC (EYE) THREE TIMES A DAY
Refills: 0 | Status: DISCONTINUED | OUTPATIENT
Start: 2019-05-14 | End: 2019-05-18

## 2019-05-14 RX ORDER — DEXTROSE 50 % IN WATER 50 %
25 SYRINGE (ML) INTRAVENOUS ONCE
Refills: 0 | Status: DISCONTINUED | OUTPATIENT
Start: 2019-05-14 | End: 2019-05-18

## 2019-05-14 RX ORDER — OXYCODONE HYDROCHLORIDE 5 MG/1
5 TABLET ORAL EVERY 4 HOURS
Refills: 0 | Status: DISCONTINUED | OUTPATIENT
Start: 2019-05-14 | End: 2019-05-18

## 2019-05-14 RX ORDER — DEXTROSE 50 % IN WATER 50 %
15 SYRINGE (ML) INTRAVENOUS ONCE
Refills: 0 | Status: DISCONTINUED | OUTPATIENT
Start: 2019-05-14 | End: 2019-05-18

## 2019-05-14 RX ORDER — SODIUM CHLORIDE 9 MG/ML
1000 INJECTION, SOLUTION INTRAVENOUS
Refills: 0 | Status: DISCONTINUED | OUTPATIENT
Start: 2019-05-14 | End: 2019-05-18

## 2019-05-14 RX ORDER — TAMSULOSIN HYDROCHLORIDE 0.4 MG/1
0.4 CAPSULE ORAL AT BEDTIME
Refills: 0 | Status: DISCONTINUED | OUTPATIENT
Start: 2019-05-14 | End: 2019-05-17

## 2019-05-14 RX ORDER — METOPROLOL TARTRATE 50 MG
12.5 TABLET ORAL
Refills: 0 | Status: DISCONTINUED | OUTPATIENT
Start: 2019-05-14 | End: 2019-05-17

## 2019-05-14 RX ORDER — ISOSORBIDE MONONITRATE 60 MG/1
30 TABLET, EXTENDED RELEASE ORAL DAILY
Refills: 0 | Status: DISCONTINUED | OUTPATIENT
Start: 2019-05-14 | End: 2019-05-18

## 2019-05-14 RX ORDER — HEPARIN SODIUM 5000 [USP'U]/ML
5000 INJECTION INTRAVENOUS; SUBCUTANEOUS EVERY 8 HOURS
Refills: 0 | Status: DISCONTINUED | OUTPATIENT
Start: 2019-05-14 | End: 2019-05-18

## 2019-05-14 RX ORDER — ACETAMINOPHEN 500 MG
650 TABLET ORAL EVERY 6 HOURS
Refills: 0 | Status: DISCONTINUED | OUTPATIENT
Start: 2019-05-14 | End: 2019-05-18

## 2019-05-14 RX ADMIN — HEPARIN SODIUM 5000 UNIT(S): 5000 INJECTION INTRAVENOUS; SUBCUTANEOUS at 21:01

## 2019-05-14 RX ADMIN — Medication 50 GRAM(S): at 00:36

## 2019-05-14 RX ADMIN — Medication 250 MILLIGRAM(S): at 20:59

## 2019-05-14 RX ADMIN — PIPERACILLIN AND TAZOBACTAM 25 GRAM(S): 4; .5 INJECTION, POWDER, LYOPHILIZED, FOR SOLUTION INTRAVENOUS at 14:09

## 2019-05-14 RX ADMIN — Medication 1 APPLICATION(S): at 21:01

## 2019-05-14 RX ADMIN — Medication 250 MILLIGRAM(S): at 10:28

## 2019-05-14 RX ADMIN — TAMSULOSIN HYDROCHLORIDE 0.4 MILLIGRAM(S): 0.4 CAPSULE ORAL at 21:01

## 2019-05-14 RX ADMIN — ISOSORBIDE MONONITRATE 30 MILLIGRAM(S): 60 TABLET, EXTENDED RELEASE ORAL at 12:16

## 2019-05-14 RX ADMIN — PIPERACILLIN AND TAZOBACTAM 25 GRAM(S): 4; .5 INJECTION, POWDER, LYOPHILIZED, FOR SOLUTION INTRAVENOUS at 21:01

## 2019-05-14 RX ADMIN — HEPARIN SODIUM 5000 UNIT(S): 5000 INJECTION INTRAVENOUS; SUBCUTANEOUS at 06:50

## 2019-05-14 RX ADMIN — Medication 1: at 06:52

## 2019-05-14 RX ADMIN — Medication 12.5 MILLIGRAM(S): at 06:50

## 2019-05-14 RX ADMIN — HEPARIN SODIUM 5000 UNIT(S): 5000 INJECTION INTRAVENOUS; SUBCUTANEOUS at 14:08

## 2019-05-14 RX ADMIN — Medication 1: at 16:30

## 2019-05-14 RX ADMIN — PIPERACILLIN AND TAZOBACTAM 25 GRAM(S): 4; .5 INJECTION, POWDER, LYOPHILIZED, FOR SOLUTION INTRAVENOUS at 06:50

## 2019-05-14 RX ADMIN — Medication 12.5 MILLIGRAM(S): at 18:44

## 2019-05-14 RX ADMIN — Medication 1: at 12:09

## 2019-05-14 RX ADMIN — ATORVASTATIN CALCIUM 20 MILLIGRAM(S): 80 TABLET, FILM COATED ORAL at 21:01

## 2019-05-15 LAB
ANION GAP SERPL CALC-SCNC: 8 MMO/L — SIGNIFICANT CHANGE UP (ref 7–14)
BUN SERPL-MCNC: 12 MG/DL — SIGNIFICANT CHANGE UP (ref 7–23)
CALCIUM SERPL-MCNC: 8.5 MG/DL — SIGNIFICANT CHANGE UP (ref 8.4–10.5)
CHLORIDE SERPL-SCNC: 104 MMOL/L — SIGNIFICANT CHANGE UP (ref 98–107)
CO2 SERPL-SCNC: 27 MMOL/L — SIGNIFICANT CHANGE UP (ref 22–31)
CREAT SERPL-MCNC: 0.76 MG/DL — SIGNIFICANT CHANGE UP (ref 0.5–1.3)
GLUCOSE SERPL-MCNC: 150 MG/DL — HIGH (ref 70–99)
HCT VFR BLD CALC: 35.8 % — LOW (ref 39–50)
HGB BLD-MCNC: 12 G/DL — LOW (ref 13–17)
LACTATE SERPL-SCNC: 0.8 MMOL/L — SIGNIFICANT CHANGE UP (ref 0.5–2)
MAGNESIUM SERPL-MCNC: 1.7 MG/DL — SIGNIFICANT CHANGE UP (ref 1.6–2.6)
MCHC RBC-ENTMCNC: 31.5 PG — SIGNIFICANT CHANGE UP (ref 27–34)
MCHC RBC-ENTMCNC: 33.5 % — SIGNIFICANT CHANGE UP (ref 32–36)
MCV RBC AUTO: 94 FL — SIGNIFICANT CHANGE UP (ref 80–100)
NRBC # FLD: 0 K/UL — SIGNIFICANT CHANGE UP (ref 0–0)
PHOSPHATE SERPL-MCNC: 2.4 MG/DL — LOW (ref 2.5–4.5)
PLATELET # BLD AUTO: 224 K/UL — SIGNIFICANT CHANGE UP (ref 150–400)
PMV BLD: 10.5 FL — SIGNIFICANT CHANGE UP (ref 7–13)
POTASSIUM SERPL-MCNC: 3.9 MMOL/L — SIGNIFICANT CHANGE UP (ref 3.5–5.3)
POTASSIUM SERPL-SCNC: 3.9 MMOL/L — SIGNIFICANT CHANGE UP (ref 3.5–5.3)
RBC # BLD: 3.81 M/UL — LOW (ref 4.2–5.8)
RBC # FLD: 13.3 % — SIGNIFICANT CHANGE UP (ref 10.3–14.5)
SODIUM SERPL-SCNC: 139 MMOL/L — SIGNIFICANT CHANGE UP (ref 135–145)
VANCOMYCIN TROUGH SERPL-MCNC: 6.2 UG/ML — LOW (ref 10–20)
WBC # BLD: 11.11 K/UL — HIGH (ref 3.8–10.5)
WBC # FLD AUTO: 11.11 K/UL — HIGH (ref 3.8–10.5)

## 2019-05-15 PROCEDURE — 99233 SBSQ HOSP IP/OBS HIGH 50: CPT

## 2019-05-15 PROCEDURE — 71045 X-RAY EXAM CHEST 1 VIEW: CPT | Mod: 26

## 2019-05-15 RX ORDER — POTASSIUM PHOSPHATE, MONOBASIC POTASSIUM PHOSPHATE, DIBASIC 236; 224 MG/ML; MG/ML
15 INJECTION, SOLUTION INTRAVENOUS ONCE
Refills: 0 | Status: COMPLETED | OUTPATIENT
Start: 2019-05-15 | End: 2019-05-15

## 2019-05-15 RX ORDER — MAGNESIUM SULFATE 500 MG/ML
1 VIAL (ML) INJECTION ONCE
Refills: 0 | Status: COMPLETED | OUTPATIENT
Start: 2019-05-15 | End: 2019-05-15

## 2019-05-15 RX ORDER — VANCOMYCIN HCL 1 G
1250 VIAL (EA) INTRAVENOUS EVERY 12 HOURS
Refills: 0 | Status: DISCONTINUED | OUTPATIENT
Start: 2019-05-16 | End: 2019-05-17

## 2019-05-15 RX ORDER — VANCOMYCIN HCL 1 G
VIAL (EA) INTRAVENOUS
Refills: 0 | Status: DISCONTINUED | OUTPATIENT
Start: 2019-05-15 | End: 2019-05-17

## 2019-05-15 RX ORDER — VANCOMYCIN HCL 1 G
1250 VIAL (EA) INTRAVENOUS ONCE
Refills: 0 | Status: COMPLETED | OUTPATIENT
Start: 2019-05-15 | End: 2019-05-15

## 2019-05-15 RX ORDER — FUROSEMIDE 40 MG
10 TABLET ORAL ONCE
Refills: 0 | Status: COMPLETED | OUTPATIENT
Start: 2019-05-15 | End: 2019-05-15

## 2019-05-15 RX ORDER — IPRATROPIUM/ALBUTEROL SULFATE 18-103MCG
3 AEROSOL WITH ADAPTER (GRAM) INHALATION EVERY 6 HOURS
Refills: 0 | Status: DISCONTINUED | OUTPATIENT
Start: 2019-05-15 | End: 2019-05-18

## 2019-05-15 RX ADMIN — CLOPIDOGREL BISULFATE 75 MILLIGRAM(S): 75 TABLET, FILM COATED ORAL at 13:19

## 2019-05-15 RX ADMIN — Medication 12.5 MILLIGRAM(S): at 06:45

## 2019-05-15 RX ADMIN — Medication 1 APPLICATION(S): at 13:20

## 2019-05-15 RX ADMIN — PIPERACILLIN AND TAZOBACTAM 25 GRAM(S): 4; .5 INJECTION, POWDER, LYOPHILIZED, FOR SOLUTION INTRAVENOUS at 21:12

## 2019-05-15 RX ADMIN — Medication 3 MILLILITER(S): at 22:23

## 2019-05-15 RX ADMIN — OXYCODONE HYDROCHLORIDE 5 MILLIGRAM(S): 5 TABLET ORAL at 20:10

## 2019-05-15 RX ADMIN — Medication 1: at 11:54

## 2019-05-15 RX ADMIN — Medication 100 GRAM(S): at 06:45

## 2019-05-15 RX ADMIN — Medication 250 MILLIGRAM(S): at 09:56

## 2019-05-15 RX ADMIN — HEPARIN SODIUM 5000 UNIT(S): 5000 INJECTION INTRAVENOUS; SUBCUTANEOUS at 21:12

## 2019-05-15 RX ADMIN — OXYCODONE HYDROCHLORIDE 5 MILLIGRAM(S): 5 TABLET ORAL at 20:40

## 2019-05-15 RX ADMIN — Medication 1 APPLICATION(S): at 06:44

## 2019-05-15 RX ADMIN — ISOSORBIDE MONONITRATE 30 MILLIGRAM(S): 60 TABLET, EXTENDED RELEASE ORAL at 13:19

## 2019-05-15 RX ADMIN — Medication 12.5 MILLIGRAM(S): at 16:05

## 2019-05-15 RX ADMIN — Medication 10 MILLIGRAM(S): at 09:56

## 2019-05-15 RX ADMIN — PIPERACILLIN AND TAZOBACTAM 25 GRAM(S): 4; .5 INJECTION, POWDER, LYOPHILIZED, FOR SOLUTION INTRAVENOUS at 13:19

## 2019-05-15 RX ADMIN — Medication 166.67 MILLIGRAM(S): at 22:17

## 2019-05-15 RX ADMIN — Medication 81 MILLIGRAM(S): at 13:19

## 2019-05-15 RX ADMIN — HEPARIN SODIUM 5000 UNIT(S): 5000 INJECTION INTRAVENOUS; SUBCUTANEOUS at 13:18

## 2019-05-15 RX ADMIN — Medication 1 APPLICATION(S): at 21:12

## 2019-05-15 RX ADMIN — TAMSULOSIN HYDROCHLORIDE 0.4 MILLIGRAM(S): 0.4 CAPSULE ORAL at 21:12

## 2019-05-15 RX ADMIN — PIPERACILLIN AND TAZOBACTAM 25 GRAM(S): 4; .5 INJECTION, POWDER, LYOPHILIZED, FOR SOLUTION INTRAVENOUS at 06:45

## 2019-05-15 RX ADMIN — ATORVASTATIN CALCIUM 20 MILLIGRAM(S): 80 TABLET, FILM COATED ORAL at 21:12

## 2019-05-15 RX ADMIN — HEPARIN SODIUM 5000 UNIT(S): 5000 INJECTION INTRAVENOUS; SUBCUTANEOUS at 06:44

## 2019-05-15 RX ADMIN — POTASSIUM PHOSPHATE, MONOBASIC POTASSIUM PHOSPHATE, DIBASIC 62.5 MILLIMOLE(S): 236; 224 INJECTION, SOLUTION INTRAVENOUS at 06:45

## 2019-05-15 RX ADMIN — Medication 100 GRAM(S): at 16:05

## 2019-05-15 NOTE — PROVIDER CONTACT NOTE (OTHER) - BACKGROUND
admit to cticu for r/o sepsis secondary to R chest tube to pneumostat; Chest tube d/c'd 5/14; culture results pending.

## 2019-05-16 ENCOUNTER — TRANSCRIPTION ENCOUNTER (OUTPATIENT)
Age: 69
End: 2019-05-16

## 2019-05-16 LAB
-  CEFAZOLIN: SIGNIFICANT CHANGE UP
-  CIPROFLOXACIN: SIGNIFICANT CHANGE UP
-  CLINDAMYCIN: SIGNIFICANT CHANGE UP
-  ERYTHROMYCIN: SIGNIFICANT CHANGE UP
-  GENTAMICIN: SIGNIFICANT CHANGE UP
-  LEVOFLOXACIN: SIGNIFICANT CHANGE UP
-  MOXIFLOXACIN(AEROBIC): SIGNIFICANT CHANGE UP
-  OXACILLIN: SIGNIFICANT CHANGE UP
-  PENICILLIN: SIGNIFICANT CHANGE UP
-  RIFAMPIN.: SIGNIFICANT CHANGE UP
-  TETRACYCLINE: SIGNIFICANT CHANGE UP
-  TRIMETHOPRIM/SULFAMETHOXAZOLE: SIGNIFICANT CHANGE UP
-  VANCOMYCIN: SIGNIFICANT CHANGE UP
ANION GAP SERPL CALC-SCNC: 12 MMO/L — SIGNIFICANT CHANGE UP (ref 7–14)
BUN SERPL-MCNC: 13 MG/DL — SIGNIFICANT CHANGE UP (ref 7–23)
CALCIUM SERPL-MCNC: 8.7 MG/DL — SIGNIFICANT CHANGE UP (ref 8.4–10.5)
CHLORIDE SERPL-SCNC: 104 MMOL/L — SIGNIFICANT CHANGE UP (ref 98–107)
CO2 SERPL-SCNC: 25 MMOL/L — SIGNIFICANT CHANGE UP (ref 22–31)
CREAT SERPL-MCNC: 0.83 MG/DL — SIGNIFICANT CHANGE UP (ref 0.5–1.3)
GLUCOSE SERPL-MCNC: 144 MG/DL — HIGH (ref 70–99)
GRAM STN FLD: SIGNIFICANT CHANGE UP
HCT VFR BLD CALC: 35.5 % — LOW (ref 39–50)
HGB BLD-MCNC: 11.9 G/DL — LOW (ref 13–17)
MAGNESIUM SERPL-MCNC: 1.9 MG/DL — SIGNIFICANT CHANGE UP (ref 1.6–2.6)
MCHC RBC-ENTMCNC: 31.5 PG — SIGNIFICANT CHANGE UP (ref 27–34)
MCHC RBC-ENTMCNC: 33.5 % — SIGNIFICANT CHANGE UP (ref 32–36)
MCV RBC AUTO: 93.9 FL — SIGNIFICANT CHANGE UP (ref 80–100)
METHOD TYPE: SIGNIFICANT CHANGE UP
NRBC # FLD: 0 K/UL — SIGNIFICANT CHANGE UP (ref 0–0)
ORGANISM # SPEC MICROSCOPIC CNT: SIGNIFICANT CHANGE UP
ORGANISM # SPEC MICROSCOPIC CNT: SIGNIFICANT CHANGE UP
PHOSPHATE SERPL-MCNC: 3.1 MG/DL — SIGNIFICANT CHANGE UP (ref 2.5–4.5)
PLATELET # BLD AUTO: 241 K/UL — SIGNIFICANT CHANGE UP (ref 150–400)
PMV BLD: 10.4 FL — SIGNIFICANT CHANGE UP (ref 7–13)
POTASSIUM SERPL-MCNC: 3.5 MMOL/L — SIGNIFICANT CHANGE UP (ref 3.5–5.3)
POTASSIUM SERPL-SCNC: 3.5 MMOL/L — SIGNIFICANT CHANGE UP (ref 3.5–5.3)
RBC # BLD: 3.78 M/UL — LOW (ref 4.2–5.8)
RBC # FLD: 13.2 % — SIGNIFICANT CHANGE UP (ref 10.3–14.5)
SODIUM SERPL-SCNC: 141 MMOL/L — SIGNIFICANT CHANGE UP (ref 135–145)
WBC # BLD: 6.97 K/UL — SIGNIFICANT CHANGE UP (ref 3.8–10.5)
WBC # FLD AUTO: 6.97 K/UL — SIGNIFICANT CHANGE UP (ref 3.8–10.5)

## 2019-05-16 PROCEDURE — 71045 X-RAY EXAM CHEST 1 VIEW: CPT | Mod: 26

## 2019-05-16 PROCEDURE — 99233 SBSQ HOSP IP/OBS HIGH 50: CPT

## 2019-05-16 RX ORDER — MAGNESIUM SULFATE 500 MG/ML
1 VIAL (ML) INJECTION ONCE
Refills: 0 | Status: COMPLETED | OUTPATIENT
Start: 2019-05-16 | End: 2019-05-16

## 2019-05-16 RX ORDER — INSULIN LISPRO 100/ML
VIAL (ML) SUBCUTANEOUS AT BEDTIME
Refills: 0 | Status: DISCONTINUED | OUTPATIENT
Start: 2019-05-16 | End: 2019-05-18

## 2019-05-16 RX ORDER — INSULIN LISPRO 100/ML
VIAL (ML) SUBCUTANEOUS
Refills: 0 | Status: DISCONTINUED | OUTPATIENT
Start: 2019-05-16 | End: 2019-05-18

## 2019-05-16 RX ORDER — POTASSIUM CHLORIDE 20 MEQ
40 PACKET (EA) ORAL ONCE
Refills: 0 | Status: COMPLETED | OUTPATIENT
Start: 2019-05-16 | End: 2019-05-16

## 2019-05-16 RX ADMIN — Medication 1: at 07:41

## 2019-05-16 RX ADMIN — Medication 1 APPLICATION(S): at 05:57

## 2019-05-16 RX ADMIN — ATORVASTATIN CALCIUM 20 MILLIGRAM(S): 80 TABLET, FILM COATED ORAL at 22:28

## 2019-05-16 RX ADMIN — Medication 100 GRAM(S): at 06:57

## 2019-05-16 RX ADMIN — TAMSULOSIN HYDROCHLORIDE 0.4 MILLIGRAM(S): 0.4 CAPSULE ORAL at 22:28

## 2019-05-16 RX ADMIN — OXYCODONE HYDROCHLORIDE 5 MILLIGRAM(S): 5 TABLET ORAL at 19:36

## 2019-05-16 RX ADMIN — PIPERACILLIN AND TAZOBACTAM 25 GRAM(S): 4; .5 INJECTION, POWDER, LYOPHILIZED, FOR SOLUTION INTRAVENOUS at 22:51

## 2019-05-16 RX ADMIN — Medication 81 MILLIGRAM(S): at 13:56

## 2019-05-16 RX ADMIN — Medication 3 MILLILITER(S): at 22:24

## 2019-05-16 RX ADMIN — Medication 3 MILLILITER(S): at 16:48

## 2019-05-16 RX ADMIN — Medication 3 MILLILITER(S): at 11:45

## 2019-05-16 RX ADMIN — Medication 12.5 MILLIGRAM(S): at 05:56

## 2019-05-16 RX ADMIN — PIPERACILLIN AND TAZOBACTAM 25 GRAM(S): 4; .5 INJECTION, POWDER, LYOPHILIZED, FOR SOLUTION INTRAVENOUS at 13:55

## 2019-05-16 RX ADMIN — ISOSORBIDE MONONITRATE 30 MILLIGRAM(S): 60 TABLET, EXTENDED RELEASE ORAL at 13:55

## 2019-05-16 RX ADMIN — HEPARIN SODIUM 5000 UNIT(S): 5000 INJECTION INTRAVENOUS; SUBCUTANEOUS at 22:29

## 2019-05-16 RX ADMIN — Medication 40 MILLIEQUIVALENT(S): at 06:57

## 2019-05-16 RX ADMIN — Medication 166.67 MILLIGRAM(S): at 09:11

## 2019-05-16 RX ADMIN — PIPERACILLIN AND TAZOBACTAM 25 GRAM(S): 4; .5 INJECTION, POWDER, LYOPHILIZED, FOR SOLUTION INTRAVENOUS at 05:56

## 2019-05-16 RX ADMIN — CLOPIDOGREL BISULFATE 75 MILLIGRAM(S): 75 TABLET, FILM COATED ORAL at 13:56

## 2019-05-16 RX ADMIN — HEPARIN SODIUM 5000 UNIT(S): 5000 INJECTION INTRAVENOUS; SUBCUTANEOUS at 05:56

## 2019-05-16 RX ADMIN — Medication 3 MILLILITER(S): at 03:35

## 2019-05-16 RX ADMIN — HEPARIN SODIUM 5000 UNIT(S): 5000 INJECTION INTRAVENOUS; SUBCUTANEOUS at 13:55

## 2019-05-16 RX ADMIN — Medication 166.67 MILLIGRAM(S): at 22:50

## 2019-05-16 RX ADMIN — Medication 12.5 MILLIGRAM(S): at 17:38

## 2019-05-16 RX ADMIN — Medication 1 APPLICATION(S): at 13:56

## 2019-05-16 RX ADMIN — Medication 1: at 17:51

## 2019-05-16 RX ADMIN — OXYCODONE HYDROCHLORIDE 5 MILLIGRAM(S): 5 TABLET ORAL at 20:06

## 2019-05-16 NOTE — DISCHARGE NOTE PROVIDER - NSDCFUADDAPPT_GEN_ALL_CORE_FT
See Dr Mejía in 2 weeks.  Call for an appointment.  Bring a new chest X-ray when you come. See Dr Mejía in 2 weeks.  Call for an appointment.  Bring a new chest X-ray when you come.  SEe Dr. Phil Mejía Cardiologist in 1-2 weeks to discuss Afib while in hospital. Take new  cardiac medications.   See Urologist if delayed bladder emptying returns.

## 2019-05-16 NOTE — DISCHARGE NOTE PROVIDER - HOSPITAL COURSE
This 68 y/o male is s/p FB, Robotic Assisted RVATS, RML wedge x2, BAL and  s/p Robotic RVATS, RUL lobectomy and RML wedge.  Pathology from the original surgery showed adenocarcinoma.  The more recent surgery showed necrotizing granulomas which were negative for AFB.  He was last discharged on 05/07/19 with a chest tube to a pneumostat due to a persistent air leak.  He presented to [facility] from home with complaints of SOB, cough, & worsening pain for several hours. He stated that pain at his chest tube site made it difficult for him to breathe.  As per the pt's daughter, the chest tube drainage had recently decreased and the chest tube site had begun to appear more red & swollen with purulent drainage. The pt was found to be tachycardic and febrile with a rectal temperature of 102F and a WBC of 13.9. He was transferred to [facility] with a concern for sepsis.  A CT scan of the chest was done prior to his transport and it was negative for PE and only showed expected post-op changes with a small R Hydro-PTX and the chest tube in place.  At [facility], the pt was comfortable and without complaints.  He was admitted to the CTICU.  Cultures were sent and he was placed on IV Vancomycin and IV Zosyn.  His pleural fluid grew MSSA but blood cultures were negative.  His chest tube was removed and he was for discharge home. Antibiotic recommendations were per ID. This 68 y/o male is s/p FB, Robotic Assisted RVATS, RML wedge x2, BAL and  s/p Robotic RVATS, RUL lobectomy and RML wedge.  Pathology from the original surgery showed adenocarcinoma.  The more recent surgery showed necrotizing granulomas which were negative for AFB.  He was last discharged on 05/07/19 with a chest tube to a pneumostat due to a persistent air leak.  He presented to [facility] from home with complaints of SOB, cough, & worsening pain for several hours. He stated that pain at his chest tube site made it difficult for him to breathe.  As per the pt's daughter, the chest tube drainage had recently decreased and the chest tube site had begun to appear more red & swollen with purulent drainage. The pt was found to be tachycardic and febrile with a rectal temperature of 102F and a WBC of 13.9. He was transferred to [facility] with a concern for sepsis.  A CT scan of the chest was done prior to his transport and it was negative for PE and only showed expected post-op changes with a small R Hydro-PTX and the chest tube in place.  At [facility], the pt was comfortable and without complaints.  He was admitted to the CTICU.  Cultures were sent and he was placed on IV Vancomycin and IV Zosyn. His pleural fluid grew MSSA but blood cultures were negative.  His chest tube was removed and he was for discharge home. PT was discharged on Keflex 500 q6 for 10 days for a total of 14 days of antibiotic therapy.         On 5/17 Pt is feeling well, afebrile, HR 70s, saturating 100% on room air. Pts WBC downtrended to 6.97 from 11.11. Pt cleared for dc on abx per PL. This 70 y/o male is s/p FB, Robotic Assisted RVATS, RML wedge x2, BAL and  s/p Robotic RVATS, RUL lobectomy and RML wedge.  Pathology from the original surgery showed adenocarcinoma.  The more recent surgery showed necrotizing granulomas which were negative for AFB.  He was last discharged on 05/07/19 with a chest tube to a pneumostat due to a persistent air leak.  He presented to [facility] from home with complaints of SOB, cough, & worsening pain for several hours. He stated that pain at his chest tube site made it difficult for him to breathe.  As per the pt's daughter, the chest tube drainage had recently decreased and the chest tube site had begun to appear more red & swollen with purulent drainage. The pt was found to be tachycardic and febrile with a rectal temperature of 102F and a WBC of 13.9. He was transferred to [facility] with a concern for sepsis.  A CT scan of the chest was done prior to his transport and it was negative for PE and only showed expected post-op changes with a small R Hydro-PTX and the chest tube in place.  At [facility], the pt was comfortable and without complaints.  He was admitted to the CTICU.  Cultures were sent and he was placed on IV Vancomycin and IV Zosyn. His pleural fluid grew MSSA but blood cultures were negative.  His chest tube was removed and he was for discharge home. PT was discharged on Keflex 500 q6 for 10 days for a total of 14 days of antibiotic therapy. On 5/16 pt had brief NICO to 160s. Treated w lopressor with quick conversion. Pt also c/o delayed bladder    emptying. Bladder scan x 2 for only 400cc with u/o of 1.4L in 24hrs. Flomax increased to BID and pt states improvement today. No futher Afib x 24hrs. Seen by Cardiology Dr. Escobedo. Will continue     Lopressor. NO objection to discharge, will be evaluated as outpt for need for A/c. D/w w pt's daughter at length. Cleared for d/c to home by Dr. Fowler.     Vital Signs Last 24 Hrs    T(C): 36.4 (18 May 2019 09:10), Max: 37.4 (17 May 2019 20:28)    T(F): 97.5 (18 May 2019 09:10), Max: 99.3 (17 May 2019 20:28)    HR: 74 (18 May 2019 10:50) (70 - 86)    BP: 124/62 (18 May 2019 09:10) (115/67 - 147/65)    BP(mean): --    RR: 17 (18 May 2019 09:10) (16 - 18)    SpO2: 97% (18 May 2019 10:50) (96% - 97%)

## 2019-05-16 NOTE — DISCHARGE NOTE PROVIDER - CARE PROVIDER_API CALL
Williams Mejía (MD)  Surgery; Thoracic Surgery  [id number] [place] [address]  Phone: [phone]  Fax: [phone]  Follow Up Time:

## 2019-05-16 NOTE — DISCHARGE NOTE PROVIDER - NSDCCPCAREPLAN_GEN_ALL_CORE_FT
PRINCIPAL DISCHARGE DIAGNOSIS  Diagnosis: Cellulitis  Assessment and Plan of Treatment: Please continue your antibiotic as directed. Please walk 4-5 x per day, Increase as tolerated. You may climb stairs. Continue to use incentive spirometer.   You may keep all wounds uncovered. Please shower daily. The suture will be removed in office at follow up apt.   See Dr. Mejía office in 2 weeks. Please call [phone] for an apt. Please get an CXR the day before your appointment and bring it with you to your follow up appointment.  Please call the office at [phone] if you have fever's chills, worsening shortness of breath, chest pain, warmth, redness or purulent discharge from the insertion site.

## 2019-05-16 NOTE — DISCHARGE NOTE PROVIDER - NSDCACTIVITY_GEN_ALL_CORE
Sex allowed/Showering allowed/Do not drive or operate machinery/Stairs allowed/Walking - Indoors allowed/Walking - Outdoors allowed/No heavy lifting/straining Stairs allowed/Sex allowed/Showering allowed/Do not make important decisions/No heavy lifting/straining/Walking - Outdoors allowed/Do not drive or operate machinery/Walking - Indoors allowed

## 2019-05-17 LAB
ANION GAP SERPL CALC-SCNC: 11 MMO/L — SIGNIFICANT CHANGE UP (ref 7–14)
BUN SERPL-MCNC: 11 MG/DL — SIGNIFICANT CHANGE UP (ref 7–23)
CALCIUM SERPL-MCNC: 9.4 MG/DL — SIGNIFICANT CHANGE UP (ref 8.4–10.5)
CHLORIDE SERPL-SCNC: 103 MMOL/L — SIGNIFICANT CHANGE UP (ref 98–107)
CO2 SERPL-SCNC: 26 MMOL/L — SIGNIFICANT CHANGE UP (ref 22–31)
CREAT SERPL-MCNC: 0.86 MG/DL — SIGNIFICANT CHANGE UP (ref 0.5–1.3)
GLUCOSE SERPL-MCNC: 199 MG/DL — HIGH (ref 70–99)
POTASSIUM SERPL-MCNC: 4.2 MMOL/L — SIGNIFICANT CHANGE UP (ref 3.5–5.3)
POTASSIUM SERPL-SCNC: 4.2 MMOL/L — SIGNIFICANT CHANGE UP (ref 3.5–5.3)
SODIUM SERPL-SCNC: 140 MMOL/L — SIGNIFICANT CHANGE UP (ref 135–145)
VANCOMYCIN TROUGH SERPL-MCNC: 9 UG/ML — LOW (ref 10–20)

## 2019-05-17 PROCEDURE — 71045 X-RAY EXAM CHEST 1 VIEW: CPT | Mod: 26

## 2019-05-17 RX ORDER — CEPHALEXIN 500 MG
500 CAPSULE ORAL
Refills: 0 | Status: DISCONTINUED | OUTPATIENT
Start: 2019-05-17 | End: 2019-05-18

## 2019-05-17 RX ORDER — CEPHALEXIN 500 MG
1 CAPSULE ORAL
Qty: 40 | Refills: 0
Start: 2019-05-17 | End: 2019-05-26

## 2019-05-17 RX ORDER — OXYCODONE HYDROCHLORIDE 5 MG/1
1 TABLET ORAL
Qty: 16 | Refills: 0
Start: 2019-05-17 | End: 2019-05-20

## 2019-05-17 RX ORDER — METOPROLOL TARTRATE 50 MG
25 TABLET ORAL
Refills: 0 | Status: DISCONTINUED | OUTPATIENT
Start: 2019-05-17 | End: 2019-05-18

## 2019-05-17 RX ORDER — METOPROLOL TARTRATE 50 MG
5 TABLET ORAL ONCE
Refills: 0 | Status: COMPLETED | OUTPATIENT
Start: 2019-05-17 | End: 2019-05-17

## 2019-05-17 RX ORDER — TAMSULOSIN HYDROCHLORIDE 0.4 MG/1
0.4 CAPSULE ORAL
Refills: 0 | Status: DISCONTINUED | OUTPATIENT
Start: 2019-05-17 | End: 2019-05-18

## 2019-05-17 RX ORDER — TAMSULOSIN HYDROCHLORIDE 0.4 MG/1
0.4 CAPSULE ORAL ONCE
Refills: 0 | Status: COMPLETED | OUTPATIENT
Start: 2019-05-17 | End: 2019-05-17

## 2019-05-17 RX ORDER — METFORMIN HYDROCHLORIDE 850 MG/1
500 TABLET ORAL
Refills: 0 | Status: DISCONTINUED | OUTPATIENT
Start: 2019-05-17 | End: 2019-05-18

## 2019-05-17 RX ADMIN — HEPARIN SODIUM 5000 UNIT(S): 5000 INJECTION INTRAVENOUS; SUBCUTANEOUS at 05:57

## 2019-05-17 RX ADMIN — Medication 1: at 12:42

## 2019-05-17 RX ADMIN — TAMSULOSIN HYDROCHLORIDE 0.4 MILLIGRAM(S): 0.4 CAPSULE ORAL at 22:02

## 2019-05-17 RX ADMIN — HEPARIN SODIUM 5000 UNIT(S): 5000 INJECTION INTRAVENOUS; SUBCUTANEOUS at 22:02

## 2019-05-17 RX ADMIN — Medication 500 MILLIGRAM(S): at 13:15

## 2019-05-17 RX ADMIN — Medication 3 MILLILITER(S): at 10:20

## 2019-05-17 RX ADMIN — Medication 12.5 MILLIGRAM(S): at 05:57

## 2019-05-17 RX ADMIN — TAMSULOSIN HYDROCHLORIDE 0.4 MILLIGRAM(S): 0.4 CAPSULE ORAL at 14:52

## 2019-05-17 RX ADMIN — ATORVASTATIN CALCIUM 20 MILLIGRAM(S): 80 TABLET, FILM COATED ORAL at 22:02

## 2019-05-17 RX ADMIN — Medication 3 MILLILITER(S): at 21:52

## 2019-05-17 RX ADMIN — ISOSORBIDE MONONITRATE 30 MILLIGRAM(S): 60 TABLET, EXTENDED RELEASE ORAL at 12:41

## 2019-05-17 RX ADMIN — Medication 500 MILLIGRAM(S): at 18:33

## 2019-05-17 RX ADMIN — Medication 1 APPLICATION(S): at 05:58

## 2019-05-17 RX ADMIN — PIPERACILLIN AND TAZOBACTAM 25 GRAM(S): 4; .5 INJECTION, POWDER, LYOPHILIZED, FOR SOLUTION INTRAVENOUS at 05:57

## 2019-05-17 RX ADMIN — Medication 1 APPLICATION(S): at 14:52

## 2019-05-17 RX ADMIN — CLOPIDOGREL BISULFATE 75 MILLIGRAM(S): 75 TABLET, FILM COATED ORAL at 12:42

## 2019-05-17 RX ADMIN — Medication 25 MILLIGRAM(S): at 18:33

## 2019-05-17 RX ADMIN — Medication 5 MILLIGRAM(S): at 11:41

## 2019-05-17 RX ADMIN — METFORMIN HYDROCHLORIDE 500 MILLIGRAM(S): 850 TABLET ORAL at 18:40

## 2019-05-17 RX ADMIN — HEPARIN SODIUM 5000 UNIT(S): 5000 INJECTION INTRAVENOUS; SUBCUTANEOUS at 14:52

## 2019-05-17 RX ADMIN — Medication 1: at 09:40

## 2019-05-17 RX ADMIN — Medication 1: at 18:33

## 2019-05-17 RX ADMIN — Medication 3 MILLILITER(S): at 03:58

## 2019-05-17 RX ADMIN — Medication 81 MILLIGRAM(S): at 12:42

## 2019-05-17 NOTE — PROGRESS NOTE ADULT - SUBJECTIVE AND OBJECTIVE BOX
OLSZEWSKI, WALDEMAR  MRN-[id number]    Patient is a 69y old  Male who presents with a chief complaint of "I'm having lung surgery." (14 May 2019 06:18)    HPI:  This 70 y/o male is 2 weeks s/p FB, Robotic Assisted RVATS, RML wedge x2, BAL with pathology showing necrotizing granulomas and over one year s/p Robotic RVATS, RUL lobectomy and RML wedge with pathology positive for adenocarcinoma was admitted with sepsis, now resolved.   Per history, pathology from the original surgery showed adenocarcinoma.  The more recent surgery showed necrotizing granulomas which were negative for AFB.  He was last discharged on 05/07/19 with a chest tube to a pneumostat due to a persistent air leak.  He presented to [facility] on 5/13/19 from home with complaints of SOB, cough, & worsening pain for several hours. He stated that pain at his chest tube site made it difficult for him to breathe.  As per the patient's daughter, the chest tube drainage has recently decreased and the chest tube site has begun to appear more red & swollen with purulent drainage. The patient was found to be tachycardic and febrile with a rectal temperature of 102F and a WBC of 13.9. He was transferred to [facility] with a concern for sepsis. A CT scan of the chest was done prior to his transport and it was negative for PE and only showed expected post-op changes with a small R Hydro-PTX and the chest tube in place.  The patient is no longer septic and has no complaints.     PAST MEDICAL & SURGICAL HISTORY:  Diabetes mellitus: type 2  Malignant neoplasm of lung: denies chemo and radiation  BPH (benign prostatic hyperplasia)  Smoking  Lung nodule: Adenocarcinoma; Necrotizing granulomas  AAA (abdominal aortic aneurysm)  MI (myocardial infarction)  Coronary insufficiency  HLD (hyperlipidemia)  HTN (hypertension)  History of lung biopsy: 3/2019  H/O knee surgery: rt knee meniscus  History of lung surgery: S/P Rt VATS wedge on 2018  History of ear surgery  S/P primary angioplasty with coronary stent: X1 2017, X2 2009    FAMILY HISTORY:  Family history of coronary artery disease (Sibling)    Social History:  Denies illicit drug use or frequent alcohol consumption. Endorses prior smoking history    Allergies  No Known Allergies    MEDICATIONS  (STANDING):  aspirin  chewable 81 milliGRAM(s) Oral daily  atorvastatin 20 milliGRAM(s) Oral daily  clopidogrel Tablet 75 milliGRAM(s) Oral daily  dextrose 5%. 1000 milliLiter(s) (50 mL/Hr) IV Continuous <Continuous>  dextrose 50% Injectable 12.5 Gram(s) IV Push once  dextrose 50% Injectable 25 Gram(s) IV Push once  dextrose 50% Injectable 25 Gram(s) IV Push once  erythromycin   Ointment 1 Application(s) Both EYES three times a day  heparin  Injectable 5000 Unit(s) SubCutaneous every 8 hours  insulin lispro (HumaLOG) corrective regimen sliding scale   SubCutaneous every 6 hours  isosorbide   mononitrate ER Tablet (IMDUR) 30 milliGRAM(s) Oral daily  magnesium sulfate  IVPB 1 Gram(s) IV Intermittent once  metoprolol tartrate 12.5 milliGRAM(s) Oral two times a day  piperacillin/tazobactam IVPB. 3.375 Gram(s) IV Intermittent every 8 hours  potassium phosphate IVPB 15 milliMole(s) IV Intermittent once  tamsulosin 0.4 milliGRAM(s) Oral at bedtime  vancomycin  IVPB 1000 milliGRAM(s) IV Intermittent every 12 hours    MEDICATIONS  (PRN):  acetaminophen   Tablet .. 650 milliGRAM(s) Oral every 6 hours PRN Temp greater or equal to 38C (100.4F), Mild Pain (1 - 3)  dextrose 40% Gel 15 Gram(s) Oral once PRN Blood Glucose LESS THAN 70 milliGRAM(s)/deciliter  glucagon  Injectable 1 milliGRAM(s) IntraMuscular once PRN Glucose LESS THAN 70 milligrams/deciliter  oxyCODONE    IR 10 milliGRAM(s) Oral every 4 hours PRN Severe Pain (7 - 10)  oxyCODONE    IR 5 milliGRAM(s) Oral every 4 hours PRN Moderate Pain (4 - 6)    Review of Systems:  Constitutional:  Negative for weight change, fever, malaise  HEENT:  Negative for sinus pain, hoarseness, sore throat, dysphagia, vision changes  Cardiovascular:  Negative for chest pain, palpitations, dizziness  Respiratory:  Negative for cough, wheezing, dyspnea  Gastrointestinal:  Negative for nausea, vomiting, diarrhea, melena  Musculoskeletal:  Negative for pain, swelling, stiffness   Neuro:  Negative for weakness, numbness, headache  Psych:  Negative for anxiety, depression  Endocrine:  Negative for polyuria, polydipsia, temperature Intolerance    All other systems negative unless otherwise stated    ICU Vital Signs Last 24 Hrs  T(C): 37.2 (15 May 2019 00:00), Max: 37.5 (14 May 2019 16:00)  T(F): 98.9 (15 May 2019 00:00), Max: 99.5 (14 May 2019 16:00)  HR: 67 (15 May 2019 04:00) (65 - 92)  BP: 129/59 (15 May 2019 04:00) (113/64 - 142/72)  BP(mean): 77 (15 May 2019 04:00) (75 - 89)  ABP: --  ABP(mean): --  RR: 31 (15 May 2019 04:00) (21 - 33)  SpO2: 94% (15 May 2019 04:00) (93% - 99%)    Daily     Daily   I&O's Summary    13 May 2019 07:01  -  14 May 2019 07:00  --------------------------------------------------------  IN: 700 mL / OUT: 850 mL / NET: -150 mL    14 May 2019 07:01  -  15 May 2019 06:38  --------------------------------------------------------  IN: 740 mL / OUT: 1300 mL / NET: -560 mL    Physical Exam:  Gen: Alert, no apparent distress  CV: Regular rate and rhythm no murmurs, rubs or gallops  Pulm: Diffuse rhonchi  Chest: Prior chest tube site is non-erythematous with dressing minimally saturated with serosanguinous fluid   GI: Abd is soft, non-tender and non-distended with +BS  Ext: No clubbing, cyanosis; 1+ bilateral lower extremity edema  Neuro: A+Ox3, follows commands and moves all extremities    Labs:                          12.0   11.11 )-----------( 224      ( 15 May 2019 04:30 )             35.8       05-15    139  |  104  |  12  ----------------------------<  150<H>  3.9   |  27  |  0.76    Ca    8.5      15 May 2019 04:30  Phos  2.4     05-15  Mg     1.7     05-15    TPro  6.2  /  Alb  3.1<L>  /  TBili  0.8  /  DBili  x   /  AST  13  /  ALT  15  /  AlkPhos  47  05-14  PT/INR - ( 13 May 2019 18:32 )   PT: 15.1 sec;   INR: 1.31 ratio    PTT - ( 13 May 2019 18:32 )  PTT:26.3 sec    Assessment/Plan: This 70 y/o male is 2 weeks s/p FB, Robotic Assisted RVATS, RML wedge x2, BAL with pathology showing necrotizing granulomas and over one year s/p Robotic RVATS, RUL lobectomy and RML wedge with pathology positive for adenocarcinoma was admitted with sepsis, now resolved.     Neuro:   Pain control with Oxycodone prn / Tylenol prn                                          Cardiovascular:    Stable hemodynamics  Not on any pressors  C/w ASA, Plavix, Lipitor, Lopressor, Imdur  Continue hemodynamic monitoring    Respiratory:  Pt is comfortable on nasal cannula  Encourage incentive spirometry  Follow up chest x-ray    GI:  Tolerating diabetic diet  Continue Zofran for nausea - PRN	          Renal:  C/w Tamsulosin for BPH  Monitor I/Os and electrolytes    Hematologic / Oncology:  No signs of active bleeding, H/H stable  HSQ for DVT ppl    Infectious disease:  Febrile with elevated lactate initially, now resolved  Pleural fluid has gram-positive cocci in pairs on gram stain  On Zosyn to cover likely strep; also on Vancomycin    Endocrine:  Continue Accuchecks with coverage    All clinical, lab, hemodynamic and radiographic data were reviewed and the plan was discussed with CTICU team.     Efren Patton MD
OLSZEWSKI, WALDEMAR                     [id number]    HPI:  This 70 y/o male is POD 12 s/p FB, Robotic Assisted RVATS, RML wedge x2, BAL and  s/p Robotic RVATS, RUL lobectomy and RML wedge  Pathology from the original surgery showed adenocarcinoma.  The more recent surgery showed necrotizing granulomas which were negative for AFB.  He was last discharged on 05/07/19 with a chest tube to a pneumostat due to a persistent air leak.  He presented to [facility] this evening from home with complaints of SOB, cough, & worsening pain for several hours. He stated that pain at his chest tube site made it difficult for him to breathe.  As per the pt's daughter, the chest tube drainage has recently decreased and the chest tube site has begun to appear more red & swollen with purulent drainage. The pt was found to be tachycardic and febrile with a rectal temperature of 102F and a WBC of 13.9. He was transferred to [facility] with a concern for sepsis.  A CT scan of the chest was done prior to his transport and it was negative for PE and only showed expected post-op changes with a small R Hydro-PTX and the chest tube in place.  Here at [facility], the pt is comfortable and without complaints.  He states that he feels "good". (13 May 2019 23:55)          Issues:  Sepsis  ?empyema  sob  acidosis  DM  CAD/MI  Dehydration        PAST MEDICAL & SURGICAL HISTORY:  Diabetes mellitus: type 2  Malignant neoplasm of lung: denies chemo and radiation  BPH (benign prostatic hyperplasia)  Smoking  Lung nodule: Adenocarcinoma; Necrotizing granulomas  AAA (abdominal aortic aneurysm)  MI (myocardial infarction)  Coronary insufficiency  HLD (hyperlipidemia)  HTN (hypertension)  History of lung biopsy: 3/2019  H/O knee surgery: rt knee meniscus  History of lung surgery: S/P Rt VATS wedge on 2018  History of ear surgery  S/P primary angioplasty with coronary stent: X1 2017, X2 2009            VITAL SIGNS:  Vital Signs Last 24 Hrs  T(C): 37.1 (14 May 2019 00:00), Max: 38.9 (13 May 2019 18:04)  T(F): 98.8 (14 May 2019 00:00), Max: 102 (13 May 2019 18:04)  HR: 81 (14 May 2019 06:00) (67 - 111)  BP: 136/62 (14 May 2019 06:00) (102/41 - 146/65)  BP(mean): 79 (14 May 2019 06:00) (51 - 96)  RR: 22 (14 May 2019 06:00) (16 - 30)  SpO2: 95% (14 May 2019 06:00) (94% - 100%)    I/Os:   I&O's Detail    13 May 2019 07:01  -  14 May 2019 06:19  --------------------------------------------------------  IN:    lactated ringers.: 525 mL  Total IN: 525 mL    OUT:    Voided: 450 mL  Total OUT: 450 mL    Total NET: 75 mL          CAPILLARY BLOOD GLUCOSE          =======================MEDICATIONS===================  MEDICATIONS  (STANDING):  atorvastatin 20 milliGRAM(s) Oral daily  dextrose 5%. 1000 milliLiter(s) (50 mL/Hr) IV Continuous <Continuous>  dextrose 50% Injectable 12.5 Gram(s) IV Push once  dextrose 50% Injectable 25 Gram(s) IV Push once  dextrose 50% Injectable 25 Gram(s) IV Push once  heparin  Injectable 5000 Unit(s) SubCutaneous every 8 hours  insulin lispro (HumaLOG) corrective regimen sliding scale   SubCutaneous every 6 hours  isosorbide   mononitrate ER Tablet (IMDUR) 30 milliGRAM(s) Oral daily  lactated ringers. 1000 milliLiter(s) (75 mL/Hr) IV Continuous <Continuous>  metoprolol tartrate 12.5 milliGRAM(s) Oral two times a day  piperacillin/tazobactam IVPB. 3.375 Gram(s) IV Intermittent every 8 hours  tamsulosin 0.4 milliGRAM(s) Oral at bedtime  vancomycin  IVPB 1000 milliGRAM(s) IV Intermittent every 12 hours    MEDICATIONS  (PRN):  acetaminophen   Tablet .. 650 milliGRAM(s) Oral every 6 hours PRN Temp greater or equal to 38C (100.4F), Mild Pain (1 - 3)  dextrose 40% Gel 15 Gram(s) Oral once PRN Blood Glucose LESS THAN 70 milliGRAM(s)/deciliter  glucagon  Injectable 1 milliGRAM(s) IntraMuscular once PRN Glucose LESS THAN 70 milligrams/deciliter  oxyCODONE    IR 10 milliGRAM(s) Oral every 4 hours PRN Severe Pain (7 - 10)  oxyCODONE    IR 5 milliGRAM(s) Oral every 4 hours PRN Moderate Pain (4 - 6)          PHYSICAL EXAM============================  General:                         Awake, alert, not in any distress  Neuro:                            Moving all extremities to commands.   Respiratory:	Air entry fair and  bilateral conducted sounds                                           Effort even and unlabored.  CV:		Regular rate and rhythm. Normal S1/S2                                          Distal pulses present.  Abdomen:	                     Soft, non-distended. Bowel sounds present   Skin:		No rash.  Extremities:	Warm, no cyanosis or edema.  Palpable pulses    ============================LABS=========================                        12.7   12.60 )-----------( 228      ( 14 May 2019 05:30 )             37.6     05-13    137  |  101  |  13  ----------------------------<  212<H>  3.9   |  23  |  0.79    Ca    8.7      13 May 2019 23:15  Mg     1.3     05-13    TPro  6.2  /  Alb  3.3  /  TBili  0.6  /  DBili  x   /  AST  12  /  ALT  16  /  AlkPhos  46  05-13    LIVER FUNCTIONS - ( 13 May 2019 23:15 )  Alb: 3.3 g/dL / Pro: 6.2 g/dL / ALK PHOS: 46 u/L / ALT: 16 u/L / AST: 12 u/L / GGT: x           PT/INR - ( 13 May 2019 18:32 )   PT: 15.1 sec;   INR: 1.31 ratio         PTT - ( 13 May 2019 18:32 )  PTT:26.3 sec        ============================IMAGING STUDIES=========================  < from: CT Angio Chest w/ IV Cont (05.13.19 @ 19:34) >  FINDINGS:  Motion degraded evaluation.     LUNGS AND AIRWAYS: Status post right upper lobectomy and right middle   lobe wedge resection. No parenchymal consolidation.     PLEURA: Small right hydropneumothorax. Right chest tube in place.    MEDIASTINUM AND CLARKE: No lymphadenopathy.    VESSELS: No central pulmonary embolism. Motion limits evaluation of the   segmental and subsegmental branches. No filling defect in the main or   lobar pulmonary arteries.     HEART: Heart size is normal. No pericardial effusion.    CHEST WALL AND LOWER NECK: Right-sided chest tube with associated   postsurgical changes including subcutaneous gas and edema.    VISUALIZED UPPER ABDOMEN: Within normal limits.    BONES: Multilevel degenerative changes.    IMPRESSION:     Motion limited study. No central pulmonary embolism.    Expected postoperative changes with small right hydropneumothorax. Right   chest tube in place.        =============================NEUROLOGY============================  Pain control with Tylenol IV   A+O x3, npnfocal  ==============================RESPIRATORY========================  Pt is on  2  L nasal canula   Comfortable, not in any distress.  Using incentive spirometry   Monitor chest tube output  Chest tube to water seal	, with cloudy fluids  Continue bronchodilators, pulmonary toilet    ============================CARDIOVASCULAR======================  Continue hemodynamic monitoring.  Not on any pressors    =====================RENAL===================  ContinueIVF  Monitor I/Os and electrolytes    ====================GASTROINTESTINAL===================  NPO  Continue GI prophylaxis with/ Protonix  Continue Zofran / Reglan for nausea - PRN	    ========================HEMATOLOGIC/ONCOLOGIC====================  Monitor chest tube output. No signs of active bleeding.   Follow CBC in AM    ============================INFECTIOUS DISEASE========================  Monitor for fever / leukocytosis.  All surgical incision / chest tube  sites look clean  Cont Vanco/zosyn, monitor Vanco level    Pt is on GI & DVT prophylaxis  OOB & ambulate       Pertinent clinical, laboratory, radiographic, hemodynamic, echocardiographic, respiratory data, microbiologic data and chart were reviewed and analyzed frequently throughout the course of the day and night  Patient seen, examined and plan discussed with CT Surgery / CTICU team during rounds.    Pt's status discussed with family at bedside, updated status        Nghia Saldivar DO FACEP
OLSZEWSKI, WALDEMAR                     [id number]    HPI:  This 68 y/o male is POD 12 s/p FB, Robotic Assisted RVATS, RML wedge x2, BAL and  s/p Robotic RVATS, RUL lobectomy and RML wedge  Pathology from the original surgery showed adenocarcinoma.  The more recent surgery showed necrotizing granulomas which were negative for AFB.  He was last discharged on 19 with a chest tube to a pneumostat due to a persistent air leak.  He presented to [facility] this evening from home with complaints of SOB, cough, & worsening pain for several hours. He stated that pain at his chest tube site made it difficult for him to breathe.  As per the pt's daughter, the chest tube drainage has recently decreased and the chest tube site has begun to appear more red & swollen with purulent drainage. The pt was found to be tachycardic and febrile with a rectal temperature of 102F and a WBC of 13.9. He was transferred to [facility] with a concern for sepsis.  A CT scan of the chest was done prior to his transport and it was negative for PE and only showed expected post-op changes with a small R Hydro-PTX and the chest tube in place.  Here at [facility], the pt is comfortable and without complaints.  He states that he feels "good". (13 May 2019 23:55)      Issues:  Sepsis  ?empyema  sob  acidosis  DM  CAD/MI  Dehydration          PAST MEDICAL & SURGICAL HISTORY:  Diabetes mellitus: type 2  Malignant neoplasm of lung: denies chemo and radiation  BPH (benign prostatic hyperplasia)  Smoking  Lung nodule: Adenocarcinoma; Necrotizing granulomas  AAA (abdominal aortic aneurysm)  MI (myocardial infarction)  Coronary insufficiency  HLD (hyperlipidemia)  HTN (hypertension)  History of lung biopsy: 3/2019  H/O knee surgery: rt knee meniscus  History of lung surgery: S/P Rt VATS wedge on   History of ear surgery  S/P primary angioplasty with coronary stent: X1 2017, X2             VITAL SIGNS:  Vital Signs Last 24 Hrs  T(C): 37.1 (14 May 2019 00:00), Max: 38.9 (13 May 2019 18:04)  T(F): 98.8 (14 May 2019 00:00), Max: 102 (13 May 2019 18:04)  HR: 86 (14 May 2019 07:00) (67 - 111)  BP: 135/72 (14 May 2019 07:00) (102/41 - 146/65)  BP(mean): 86 (14 May 2019 07:00) (51 - 96)  RR: 21 (14 May 2019 07:00) (16 - 30)  SpO2: 98% (14 May 2019 07:00) (94% - 100%)    I/Os:   I&O's Detail    13 May 2019 07:01  -  14 May 2019 07:00  --------------------------------------------------------  IN:    lactated ringers.: 675 mL  Total IN: 675 mL    OUT:    Chest Tube: 25 mL    Voided: 825 mL  Total OUT: 850 mL    Total NET: -175 mL          CAPILLARY BLOOD GLUCOSE      POCT Blood Glucose.: 168 mg/dL (14 May 2019 06:48)      =======================MEDICATIONS===================  MEDICATIONS  (STANDING):  atorvastatin 20 milliGRAM(s) Oral daily  dextrose 5%. 1000 milliLiter(s) (50 mL/Hr) IV Continuous <Continuous>  dextrose 50% Injectable 12.5 Gram(s) IV Push once  dextrose 50% Injectable 25 Gram(s) IV Push once  dextrose 50% Injectable 25 Gram(s) IV Push once  heparin  Injectable 5000 Unit(s) SubCutaneous every 8 hours  insulin lispro (HumaLOG) corrective regimen sliding scale   SubCutaneous every 6 hours  isosorbide   mononitrate ER Tablet (IMDUR) 30 milliGRAM(s) Oral daily  lactated ringers. 1000 milliLiter(s) (75 mL/Hr) IV Continuous <Continuous>  metoprolol tartrate 12.5 milliGRAM(s) Oral two times a day  piperacillin/tazobactam IVPB. 3.375 Gram(s) IV Intermittent every 8 hours  tamsulosin 0.4 milliGRAM(s) Oral at bedtime  vancomycin  IVPB 1000 milliGRAM(s) IV Intermittent every 12 hours    MEDICATIONS  (PRN):  acetaminophen   Tablet .. 650 milliGRAM(s) Oral every 6 hours PRN Temp greater or equal to 38C (100.4F), Mild Pain (1 - 3)  dextrose 40% Gel 15 Gram(s) Oral once PRN Blood Glucose LESS THAN 70 milliGRAM(s)/deciliter  glucagon  Injectable 1 milliGRAM(s) IntraMuscular once PRN Glucose LESS THAN 70 milligrams/deciliter  oxyCODONE    IR 10 milliGRAM(s) Oral every 4 hours PRN Severe Pain (7 - 10)  oxyCODONE    IR 5 milliGRAM(s) Oral every 4 hours PRN Moderate Pain (4 - 6)        PHYSICAL EXAM============================  General:                         Awake, alert, not in any distress  Neuro:                            Moving all extremities to commands.   Respiratory:	Air entry fair and  bilateral conducted sounds                                           Effort even and unlabored.  CV:		Regular rate and rhythm. Normal S1/S2                                          Distal pulses present.  Abdomen:	                     Soft, non-distended. Bowel sounds present   Skin:		No rash.  Extremities:	Warm, no cyanosis or edema.  Palpable pulses    ============================LABS=========================                        12.7   12.60 )-----------( 228      ( 14 May 2019 05:30 )             37.6     05-14    139  |  104  |  10  ----------------------------<  168<H>  3.6   |  24  |  0.66    Ca    8.5      14 May 2019 05:30  Mg     1.3     05-13    TPro  6.2  /  Alb  3.1<L>  /  TBili  0.8  /  DBili  x   /  AST  13  /  ALT  15  /  AlkPhos  47  05-14    LIVER FUNCTIONS - ( 14 May 2019 05:30 )  Alb: 3.1 g/dL / Pro: 6.2 g/dL / ALK PHOS: 47 u/L / ALT: 15 u/L / AST: 13 u/L / GGT: x           PT/INR - ( 13 May 2019 18:32 )   PT: 15.1 sec;   INR: 1.31 ratio         PTT - ( 13 May 2019 18:32 )  PTT:26.3 sec    Urinalysis Basic - ( 14 May 2019 07:00 )    Color: YELLOW / Appearance: CLEAR / S.025 / pH: 6.0  Gluc: NEGATIVE / Ketone: NEGATIVE  / Bili: NEGATIVE / Urobili: SMALL   Blood: NEGATIVE / Protein: 10 / Nitrite: NEGATIVE   Leuk Esterase: NEGATIVE / RBC: x / WBC x   Sq Epi: x / Non Sq Epi: x / Bacteria: x      ============================IMAGING STUDIES=========================  < from: CT Angio Chest w/ IV Cont (19 @ 19:34) >  FINDINGS:  Motion degraded evaluation.     LUNGS AND AIRWAYS: Status post right upper lobectomy and right middle   lobe wedge resection. No parenchymal consolidation.     PLEURA: Small right hydropneumothorax. Right chest tube in place.    MEDIASTINUM AND CLARKE: No lymphadenopathy.    VESSELS: No central pulmonary embolism. Motion limits evaluation of the   segmental and subsegmental branches. No filling defect in the main or   lobar pulmonary arteries.     HEART: Heart size is normal. No pericardial effusion.    CHEST WALL AND LOWER NECK: Right-sided chest tube with associated   postsurgical changes including subcutaneous gas and edema.    VISUALIZED UPPER ABDOMEN: Within normal limits.    BONES: Multilevel degenerative changes.    IMPRESSION:     Motion limited study. No central pulmonary embolism.    Expected postoperative changes with small right hydropneumothorax. Right   chest tube in place.        =============================NEUROLOGY============================  Pain control with Tylenol IV   A+O x3, npnfocal  ==============================RESPIRATORY========================  Pt is on  2  L nasal canula   Comfortable, not in any distress.  Using incentive spirometry   Monitor chest tube output  Chest tube to water seal	, with cloudy fluids  Continue bronchodilators, pulmonary toilet    ============================CARDIOVASCULAR======================  Continue hemodynamic monitoring.  Not on any pressors    =====================RENAL===================  ContinueIVF  Monitor I/Os and electrolytes    ====================GASTROINTESTINAL===================  NPO  Continue GI prophylaxis with/ Protonix  Continue Zofran / Reglan for nausea - PRN	    ========================HEMATOLOGIC/ONCOLOGIC====================  Monitor chest tube output. No signs of active bleeding.   Follow CBC in AM    ============================INFECTIOUS DISEASE========================  Monitor for fever / leukocytosis.  All surgical incision / chest tube  sites look clean  Cont Vanco/zosyn, monitor Vanco level    Pt is on GI & DVT prophylaxis  OOB & ambulate       Pertinent clinical, laboratory, radiographic, hemodynamic, echocardiographic, respiratory data, microbiologic data and chart were reviewed and analyzed frequently throughout the course of the day and night  Patient seen, examined and plan discussed with CT Surgery / CTICU team during rounds.    Pt's status discussed with family at bedside, updated status        Nghia Saldivar DO FACEP
OLSZEWSKI, WALDEMAR            [id number]         No Known Allergies                 HPI:  This 70 y/o male is POD 12 s/p FB, Robotic Assisted RVATS, RML wedge x2, BAL and  s/p Robotic RVATS, RUL lobectomy and RML wedge  Pathology from the original surgery showed adenocarcinoma.  The more recent surgery showed necrotizing granulomas which were negative for AFB.  He was last discharged on 05/07/19 with a chest tube to a pneumostat due to a persistent air leak.  He presented to [facility] this evening from home with complaints of SOB, cough, & worsening pain for several hours. He stated that pain at his chest tube site made it difficult for him to breathe.  As per the pt's daughter, the chest tube drainage has recently decreased and the chest tube site has begun to appear more red & swollen with purulent drainage. The pt was found to be tachycardic and febrile with a rectal temperature of 102F and a WBC of 13.9. He was transferred to [facility] with a concern for sepsis.  A CT scan of the chest was done prior to his transport and it was negative for PE and only showed expected post-op changes with a small R Hydro-PTX and the chest tube in place.  Here at [facility], the pt is comfortable and without complaints.  He states that he feels "good". (13 May 2019 23:55)      Procedure: FB, Robotic Assisted RVATS, RML wedge x2, BAL 5/1/2019      Issues:  Empyema / Sepsis  CAD  HTN  DM-2  BPH  HLD                 Home Medications:   amLODIPine 5 mg oral tablet: 1 tab(s) orally once a day in the am (01 May 2019 12:09)  aspirin 325 mg oral tablet: 1 tab(s) orally once a day (07 May 2019 10:51)  atorvastatin 20 mg oral tablet: 1 tab(s) orally once a day AM (01 May 2019 12:09)  bisoprolol 10 mg oral tablet: 1 tab(s) orally once a day in the am (01 May 2019 12:09)  clopidogrel 75 mg oral tablet: 1 tab(s) orally once a day in the am (07 May 2019 10:51)  Flomax 0.4 mg oral capsule: 1 cap(s) orally once a day (01 May 2019 12:09)  isosorbide mononitrate 30 mg oral tablet, extended release: 1 tab(s) orally once a day (in the morning) (01 May 2019 12:09)  metoprolol succinate 25 mg oral tablet, extended release: 1 tab(s) orally once a day AM (01 May 2019 12:09)  Tylenol 325 mg oral tablet: 2 tab(s) orally every 6 hours, As Needed mild pain (07 May 2019 10:51)      PAST MEDICAL & SURGICAL HISTORY:  Diabetes mellitus: type 2  Malignant neoplasm of lung: denies chemo and radiation  BPH (benign prostatic hyperplasia)  Smoking  Lung nodule: Adenocarcinoma; Necrotizing granulomas  AAA (abdominal aortic aneurysm)  MI (myocardial infarction)  Coronary insufficiency  HLD (hyperlipidemia)  HTN (hypertension)  History of lung biopsy: 3/2019  H/O knee surgery: rt knee meniscus  History of lung surgery: S/P Rt VATS wedge on 2018  History of ear surgery  S/P primary angioplasty with coronary stent: X1 2017, X2 2009        ICU Vital Signs Last 24 Hrs  T(C): 37 (16 May 2019 00:00), Max: 37.6 (15 May 2019 08:00)  T(F): 98.6 (16 May 2019 00:00), Max: 99.6 (15 May 2019 08:00)  HR: 81 (16 May 2019 05:00) (59 - 81)  BP: 114/54 (16 May 2019 04:00) (105/84 - 144/62)  BP(mean): 68 (16 May 2019 04:00) (65 - 98)  ABP: --  ABP(mean): --  RR: 25 (16 May 2019 05:00) (22 - 31)  SpO2: 93% (16 May 2019 05:00) (93% - 100%)    I&O's Detail    14 May 2019 07:01  -  15 May 2019 07:00  --------------------------------------------------------  IN:    IV PiggyBack: 350 mL    lactated ringers.: 150 mL    Oral Fluid: 240 mL  Total IN: 740 mL    OUT:    Voided: 1600 mL  Total OUT: 1600 mL    Total NET: -860 mL      15 May 2019 07:01  -  16 May 2019 06:17  --------------------------------------------------------  IN:    IV PiggyBack: 800 mL    Oral Fluid: 720 mL  Total IN: 1520 mL    OUT:    Voided: 2600 mL  Total OUT: 2600 mL    Total NET: -1080 mL        CAPILLARY BLOOD GLUCOSE      POCT Blood Glucose.: 129 mg/dL (15 May 2019 21:24)      Home Medications:  amLODIPine 5 mg oral tablet: 1 tab(s) orally once a day in the am (01 May 2019 12:09)  aspirin 325 mg oral tablet: 1 tab(s) orally once a day (07 May 2019 10:51)  atorvastatin 20 mg oral tablet: 1 tab(s) orally once a day AM (01 May 2019 12:09)  bisoprolol 10 mg oral tablet: 1 tab(s) orally once a day in the am (01 May 2019 12:09)  clopidogrel 75 mg oral tablet: 1 tab(s) orally once a day in the am (07 May 2019 10:51)  Flomax 0.4 mg oral capsule: 1 cap(s) orally once a day (01 May 2019 12:09)  isosorbide mononitrate 30 mg oral tablet, extended release: 1 tab(s) orally once a day (in the morning) (01 May 2019 12:09)  metoprolol succinate 25 mg oral tablet, extended release: 1 tab(s) orally once a day AM (01 May 2019 12:09)  Tylenol 325 mg oral tablet: 2 tab(s) orally every 6 hours, As Needed mild pain (07 May 2019 10:51)      MEDICATIONS  (STANDING):  ALBUTerol/ipratropium for Nebulization 3 milliLiter(s) Nebulizer every 6 hours  aspirin  chewable 81 milliGRAM(s) Oral daily  atorvastatin 20 milliGRAM(s) Oral daily  clopidogrel Tablet 75 milliGRAM(s) Oral daily  dextrose 5%. 1000 milliLiter(s) (50 mL/Hr) IV Continuous <Continuous>  dextrose 50% Injectable 12.5 Gram(s) IV Push once  dextrose 50% Injectable 25 Gram(s) IV Push once  dextrose 50% Injectable 25 Gram(s) IV Push once  erythromycin   Ointment 1 Application(s) Both EYES three times a day  heparin  Injectable 5000 Unit(s) SubCutaneous every 8 hours  insulin lispro (HumaLOG) corrective regimen sliding scale   SubCutaneous every 6 hours  isosorbide   mononitrate ER Tablet (IMDUR) 30 milliGRAM(s) Oral daily  metoprolol tartrate 12.5 milliGRAM(s) Oral two times a day  piperacillin/tazobactam IVPB. 3.375 Gram(s) IV Intermittent every 8 hours  tamsulosin 0.4 milliGRAM(s) Oral at bedtime  vancomycin  IVPB      vancomycin  IVPB 1250 milliGRAM(s) IV Intermittent every 12 hours    MEDICATIONS  (PRN):  acetaminophen   Tablet .. 650 milliGRAM(s) Oral every 6 hours PRN Temp greater or equal to 38C (100.4F), Mild Pain (1 - 3)  dextrose 40% Gel 15 Gram(s) Oral once PRN Blood Glucose LESS THAN 70 milliGRAM(s)/deciliter  glucagon  Injectable 1 milliGRAM(s) IntraMuscular once PRN Glucose LESS THAN 70 milligrams/deciliter  oxyCODONE    IR 10 milliGRAM(s) Oral every 4 hours PRN Severe Pain (7 - 10)  oxyCODONE    IR 5 milliGRAM(s) Oral every 4 hours PRN Moderate Pain (4 - 6)          Physical exam:                             General:               Pt is awake, alert,  not in any distress                                                  Neuro:                  Nonfocal                             Cardiovascular:   S1 & S2, regular                           Respiratory:         Air entry is fair and equal on both sides, has bilateral conducted sounds                           GI:                          Soft, nondistended and nontender, Bowel sounds active                            Ext:                        No cyanosis or edema     Labs:                                                                           11.9   6.97  )-----------( 241      ( 16 May 2019 05:15 )             35.5             05-16    141  |  104  |  13  ----------------------------<  144<H>  3.5   |  25  |  0.83    Ca    8.7      16 May 2019 05:15  Phos  3.1     05-16  Mg     1.9     05-16                        CXR:    < from: Xray Chest 1 View- PORTABLE-Routine (05.15.19 @ 06:29) >  Small loculated right effusion again seen in addition to a opacity at the   right cardio phrenic angle may represent loculated fluid or atelectasis.   Paratracheal widening unchanged. Left lung is clear. No pneumothorax   appreciated.      COMPARISON:  May 14      IMPRESSION:  Follow-up post right thoracotomy with residual loculated   fluid.        Plan:    General: This 70 y/o male is POD 12 s/p FB, Robotic Assisted RVATS, RML wedge x2, BAL and  s/p Robotic RVATS, RUL lobectomy and RML wedge  Pathology from the original surgery showed adenocarcinoma.  The more recent surgery showed necrotizing granulomas which were negative for AFB.  He was last discharged on 05/07/19 with a chest tube to a pneumostat due to a persistent air leak.  He presented to [facility] this evening from home with complaints of SOB, cough, & worsening pain for several hours. He stated that pain at his chest tube site made it difficult for him to breathe.  As per the pt's daughter, the chest tube drainage has recently decreased and the chest tube site has begun to appear more red & swollen with purulent drainage. The pt was found to be tachycardic and febrile with a rectal temperature of 102F and a WBC of 13.9. He was transferred to [facility] with a concern for sepsis.  A CT scan of the chest was done prior to his transport and it was negative for PE and only showed expected post-op changes with a small R Hydro-PTX and the chest tube in place.  Here at [facility], the pt is comfortable and without complaints.  He states that he feels "good". (13 May 2019 23:55)                            Neuro:                                         Pain control with Tylenol IV                            Cardiovascular:                                          Continue hemodynamic monitoring.    CAD / HTN: Continue Lopressor , ASA, Plavix    HLD - On Lipitor                            Respiratory:                                         Pt is on RA                                         Comfortable, not in any distress.                                         Encourage incentive spirometry                                                                Continue bronchodilators, pulmonary toilet for wheezing and SOB                            GI                                         On DASH /Diabetic diet as tolerated                                         Continue GI prophylaxis with Pepcid                                          Continue Zofran / Reglan for nausea - PRN	                                                                 Renal:                                                                                  Monitor I/Os and electrolytes. Replace Mg / K                                                                                       Hem/ Onc:                                                                                 Follow CBC in AM                           Infectious disease:                                            Strep and Staph in pleural fluid. ? Contamination, await final culture report      Continue Vanco and Zosyn, check Vanco level      Monitor for fever / leukocytosis.                                          All surgical incision / chest tube  sites look clean                            Endocrine                                             Continue Accu-Checks with coverage    Pt is on SQ Heparin and Venodyne boots for DVT prophylaxis.     Pertinent clinical, laboratory, radiographic, hemodynamic, echocardiographic, respiratory data, microbiologic data and chart were reviewed and analyzed frequently throughout the course of the day and night  Patient seen, examined and plan discussed with CT Surgeon Dr. Mejía / CTICU team during rounds.    Pt's status discussed with family at bedside, updated status            Tyrell Aguilar MD
Subjective: Translation done w Translation phone in Polish. Pt states feeling like he cannot completely empty his bladder, starting today. No pain or pressure. States he's moving his bowels well. Pt also states for SOB/palpitations while walking in the hallway earlier. At the time, NICO to 160s on Tele monitor.     Vital Signs:  Vital Signs Last 24 Hrs  T(C): 36.7 (05-17-19 @ 12:46), Max: 37.3 (05-16-19 @ 20:17)  T(F): 98.1 (05-17-19 @ 12:46), Max: 99.1 (05-16-19 @ 20:17)  HR: 80 (05-17-19 @ 12:46) (63 - 161)  BP: 115/67 (05-17-19 @ 12:46) (108/85 - 126/75)  RR: 18 (05-17-19 @ 12:46) (16 - 22)  SpO2: 96% (05-17-19 @ 12:46) (93% - 100%) on (O2)    Telemetry/Alarms: Tele NICO 120-160s this am, converted back to SR.   General: WN/WD NAD  Neurology: Awake, nonfocal, RAM x 4  Eyes: Scleras clear, PERRLA/ EOMI, Gross vision intact  ENT:Gross hearing intact, grossly patent pharynx, no stridor  Neck: Neck supple, trachea midline, No JVD,   Respiratory: CTA B/L, No wheezing, rales, rhonchi  CV: RRR, S1S2, no murmurs, rubs or gallops  Abdominal: Soft, NT, ND +BS, +BM. Bladder non distended.   Extremities: No edema, + peripheral pulses. +left Anticubital site w redness, palpable cord at what appears to be old IV site. Mild tenderness  Skin: No Rashes, Hematoma, Ecchymosis  Lymphatic: No Neck, axilla, groin LAD  Psych: Oriented x 3, normal affect  Incisions: Rt. VATS healing  Tubes: none  Relevant labs, radiology and Medications reviewed                        11.9   6.97  )-----------( 241      ( 16 May 2019 05:15 )             35.5     05-17    140  |  103  |  11  ----------------------------<  199<H>  4.2   |  26  |  0.86    Ca    9.4      17 May 2019 12:54  Phos  3.1     05-16  Mg     1.9     05-16    Pleural fluid culture  +MSSA    MEDICATIONS  (STANDING):  ALBUTerol/ipratropium for Nebulization 3 milliLiter(s) Nebulizer every 6 hours  aspirin  chewable 81 milliGRAM(s) Oral daily  atorvastatin 20 milliGRAM(s) Oral daily  cephalexin 500 milliGRAM(s) Oral four times a day  clopidogrel Tablet 75 milliGRAM(s) Oral daily  dextrose 5%. 1000 milliLiter(s) (50 mL/Hr) IV Continuous <Continuous>  dextrose 50% Injectable 12.5 Gram(s) IV Push once  dextrose 50% Injectable 25 Gram(s) IV Push once  dextrose 50% Injectable 25 Gram(s) IV Push once  erythromycin   Ointment 1 Application(s) Both EYES three times a day  heparin  Injectable 5000 Unit(s) SubCutaneous every 8 hours  insulin lispro (HumaLOG) corrective regimen sliding scale   SubCutaneous three times a day before meals  insulin lispro (HumaLOG) corrective regimen sliding scale   SubCutaneous at bedtime  isosorbide   mononitrate ER Tablet (IMDUR) 30 milliGRAM(s) Oral daily  metFORMIN 500 milliGRAM(s) Oral two times a day  metoprolol tartrate 12.5 milliGRAM(s) Oral two times a day  tamsulosin 0.4 milliGRAM(s) Oral <User Schedule>    MEDICATIONS  (PRN):  acetaminophen   Tablet .. 650 milliGRAM(s) Oral every 6 hours PRN Temp greater or equal to 38C (100.4F), Mild Pain (1 - 3)  dextrose 40% Gel 15 Gram(s) Oral once PRN Blood Glucose LESS THAN 70 milliGRAM(s)/deciliter  glucagon  Injectable 1 milliGRAM(s) IntraMuscular once PRN Glucose LESS THAN 70 milligrams/deciliter  oxyCODONE    IR 10 milliGRAM(s) Oral every 4 hours PRN Severe Pain (7 - 10)  oxyCODONE    IR 5 milliGRAM(s) Oral every 4 hours PRN Moderate Pain (4 - 6)    Pertinent Physical Exam  I&O's Summary    16 May 2019 07:01  -  17 May 2019 07:00  --------------------------------------------------------  IN: 450 mL / OUT: 1475 mL / NET: -1025 mL    17 May 2019 07:01  -  17 May 2019 14:57  --------------------------------------------------------  IN: 520 mL / OUT: 350 mL / NET: 170 mL        Assessment  69y Male  w/ PAST MEDICAL & SURGICAL HISTORY:  Diabetes mellitus: type 2  Malignant neoplasm of lung: denies chemo and radiation  BPH (benign prostatic hyperplasia)  Smoking  Lung nodule: Adenocarcinoma; Necrotizing granulomas  AAA (abdominal aortic aneurysm)  MI (myocardial infarction)  Coronary insufficiency  HLD (hyperlipidemia)  HTN (hypertension)  History of lung biopsy: 3/2019  H/O knee surgery: rt knee meniscus  History of lung surgery: S/P Rt VATS wedge on 2018  History of ear surgery  S/P primary angioplasty with coronary stent: X1 2017, X2 2009  admitted with complaints of Patient is a 69y old  Male who presents with a chief complaint of "I'm having lung surgery." (16 May 2019 21:56)  This 70 y/o male is POD 12 s/p FB, Robotic Assisted RVATS, RML wedge x2, BAL and  s/p Robotic RVATS, RUL lobectomy and RML wedge  Pathology from the original surgery showed adenocarcinoma.  The more recent surgery showed necrotizing granulomas which were negative for AFB.  He was last discharged on 05/07/19 with a chest tube to a pneumostat due to a persistent air leak.  He presented to [facility] this evening from home with complaints of SOB, cough, & worsening pain for several hours. He stated that pain at his chest tube site made it difficult for him to breathe.  As per the pt's daughter, the chest tube drainage has recently decreased and the chest tube site has begun to appear more red & swollen with purulent drainage. The pt was found to be tachycardic and febrile with a rectal temperature of 102F and a WBC of 13.9. He was transferred to [facility] with a concern for sepsis.  A CT scan of the chest was done prior to his transport and it was negative for PE and only showed expected post-op changes with a small R Hydro-PTX and the chest tube in place.  Here at [facility], the pt is comfortable and without complaints.  He states that he feels "good". (13 May 2019 23:55)  Pt admitted to CTICU. Chest tube was removed once air leak was resolved. Pt kept on Vanco/zosyn. Pleural culture sent shows MSSA. Pt continued to improve clinically. Today pt was planned for discharge on Keflex x 2 weeks however this am had symptomatic NICO and now c/o urinary retention.     PLAN  Neuro: Pain management  Pulm: Encourage coughing, deep breathing and use of incentive spirometry. Wean off supplemental oxygen as able. Daily CXR.   Cardio: Monitor telemetry/alarms. Lopressor IV given, pt converted back to SR. Unclear if will need A/c. Will re-call Dr. Escobedo to see in am. Will increase beta blocker.   GI: Tolerating diet. Continue stool softeners.  : Pt voided 100cc/ PVR bladder scan showed 450cc. Pt denies discomfort. Does not want a Umana catheter at this time. Will inc Flomax to BID for now and observe. Educated pt on need for Umana if symptoms or retention worsen.   Renal: monitor urine output, supplement electrolytes as needed  Vasc: Heparin SC/SCDs for DVT prophylaxis  Heme: Stable H/H. .   ID: Keflex x 2 weeks for MSSA. Will also cover for phlebitis. Will monitor site closely. Warm compresses  Therapy: OOB/ambulate  Disposition: Aim to D/C to home once stable.   Discussed with Cardiothoracic Team at [facility] rounds.

## 2019-05-17 NOTE — PROGRESS NOTE ADULT - REASON FOR ADMISSION
Sepsis
"I'm having lung surgery."

## 2019-05-18 ENCOUNTER — TRANSCRIPTION ENCOUNTER (OUTPATIENT)
Age: 69
End: 2019-05-18

## 2019-05-18 VITALS
DIASTOLIC BLOOD PRESSURE: 88 MMHG | RESPIRATION RATE: 17 BRPM | HEART RATE: 86 BPM | OXYGEN SATURATION: 97 % | TEMPERATURE: 99 F | SYSTOLIC BLOOD PRESSURE: 120 MMHG

## 2019-05-18 LAB
ANION GAP SERPL CALC-SCNC: 13 MMO/L — SIGNIFICANT CHANGE UP (ref 7–14)
BUN SERPL-MCNC: 11 MG/DL — SIGNIFICANT CHANGE UP (ref 7–23)
CALCIUM SERPL-MCNC: 9.4 MG/DL — SIGNIFICANT CHANGE UP (ref 8.4–10.5)
CHLORIDE SERPL-SCNC: 105 MMOL/L — SIGNIFICANT CHANGE UP (ref 98–107)
CO2 SERPL-SCNC: 25 MMOL/L — SIGNIFICANT CHANGE UP (ref 22–31)
CREAT SERPL-MCNC: 0.77 MG/DL — SIGNIFICANT CHANGE UP (ref 0.5–1.3)
CULTURE RESULTS: SIGNIFICANT CHANGE UP
CULTURE RESULTS: SIGNIFICANT CHANGE UP
GLUCOSE SERPL-MCNC: 158 MG/DL — HIGH (ref 70–99)
HCT VFR BLD CALC: 38.3 % — LOW (ref 39–50)
HGB BLD-MCNC: 12.6 G/DL — LOW (ref 13–17)
MCHC RBC-ENTMCNC: 31.3 PG — SIGNIFICANT CHANGE UP (ref 27–34)
MCHC RBC-ENTMCNC: 32.9 % — SIGNIFICANT CHANGE UP (ref 32–36)
MCV RBC AUTO: 95.3 FL — SIGNIFICANT CHANGE UP (ref 80–100)
NRBC # FLD: 0 K/UL — SIGNIFICANT CHANGE UP (ref 0–0)
PLATELET # BLD AUTO: 298 K/UL — SIGNIFICANT CHANGE UP (ref 150–400)
PMV BLD: 10.6 FL — SIGNIFICANT CHANGE UP (ref 7–13)
POTASSIUM SERPL-MCNC: 3.9 MMOL/L — SIGNIFICANT CHANGE UP (ref 3.5–5.3)
POTASSIUM SERPL-SCNC: 3.9 MMOL/L — SIGNIFICANT CHANGE UP (ref 3.5–5.3)
RBC # BLD: 4.02 M/UL — LOW (ref 4.2–5.8)
RBC # FLD: 13.5 % — SIGNIFICANT CHANGE UP (ref 10.3–14.5)
SODIUM SERPL-SCNC: 143 MMOL/L — SIGNIFICANT CHANGE UP (ref 135–145)
SPECIMEN SOURCE: SIGNIFICANT CHANGE UP
SPECIMEN SOURCE: SIGNIFICANT CHANGE UP
WBC # BLD: 6.31 K/UL — SIGNIFICANT CHANGE UP (ref 3.8–10.5)
WBC # FLD AUTO: 6.31 K/UL — SIGNIFICANT CHANGE UP (ref 3.8–10.5)

## 2019-05-18 PROCEDURE — 71045 X-RAY EXAM CHEST 1 VIEW: CPT | Mod: 26

## 2019-05-18 PROCEDURE — 99222 1ST HOSP IP/OBS MODERATE 55: CPT

## 2019-05-18 RX ORDER — METOPROLOL TARTRATE 50 MG
1 TABLET ORAL
Qty: 60 | Refills: 0
Start: 2019-05-18 | End: 2019-06-16

## 2019-05-18 RX ORDER — OXYCODONE HYDROCHLORIDE 5 MG/1
1 TABLET ORAL
Qty: 16 | Refills: 0
Start: 2019-05-18 | End: 2019-05-21

## 2019-05-18 RX ORDER — CEPHALEXIN 500 MG
1 CAPSULE ORAL
Qty: 40 | Refills: 0
Start: 2019-05-18 | End: 2019-05-27

## 2019-05-18 RX ORDER — METOPROLOL TARTRATE 50 MG
1 TABLET ORAL
Qty: 0 | Refills: 0 | DISCHARGE

## 2019-05-18 RX ORDER — METOPROLOL TARTRATE 50 MG
1 TABLET ORAL
Qty: 0 | Refills: 0 | DISCHARGE
Start: 2019-05-18

## 2019-05-18 RX ORDER — BISOPROLOL FUMARATE 10 MG/1
1 TABLET, FILM COATED ORAL
Qty: 0 | Refills: 0 | DISCHARGE

## 2019-05-18 RX ADMIN — Medication 25 MILLIGRAM(S): at 05:12

## 2019-05-18 RX ADMIN — Medication 3 MILLILITER(S): at 10:50

## 2019-05-18 RX ADMIN — Medication 500 MILLIGRAM(S): at 00:13

## 2019-05-18 RX ADMIN — Medication 81 MILLIGRAM(S): at 12:43

## 2019-05-18 RX ADMIN — HEPARIN SODIUM 5000 UNIT(S): 5000 INJECTION INTRAVENOUS; SUBCUTANEOUS at 05:13

## 2019-05-18 RX ADMIN — CLOPIDOGREL BISULFATE 75 MILLIGRAM(S): 75 TABLET, FILM COATED ORAL at 12:43

## 2019-05-18 RX ADMIN — Medication 500 MILLIGRAM(S): at 05:12

## 2019-05-18 RX ADMIN — TAMSULOSIN HYDROCHLORIDE 0.4 MILLIGRAM(S): 0.4 CAPSULE ORAL at 09:17

## 2019-05-18 RX ADMIN — Medication 500 MILLIGRAM(S): at 12:43

## 2019-05-18 RX ADMIN — METFORMIN HYDROCHLORIDE 500 MILLIGRAM(S): 850 TABLET ORAL at 06:14

## 2019-05-18 RX ADMIN — ISOSORBIDE MONONITRATE 30 MILLIGRAM(S): 60 TABLET, EXTENDED RELEASE ORAL at 12:43

## 2019-05-18 NOTE — DISCHARGE NOTE NURSING/CASE MANAGEMENT/SOCIAL WORK - NSDCPEWEB_GEN_ALL_CORE
[facility] for Tobacco Control website --- [email or web address] --- [email or web address]

## 2019-05-18 NOTE — CONSULT NOTE ADULT - SUBJECTIVE AND OBJECTIVE BOX
Cardiology/Vascular Medicine Inpatient Consultation Note    HISTORY OF PRESENT ILLNESS:  This 70 y/o male is POD 12 s/p FB, Robotic Assisted RVATS, RML wedge x2, BAL and  s/p Robotic RVATS, RUL lobectomy and RML wedge  Pathology from the original surgery showed adenocarcinoma.  The more recent surgery showed necrotizing granulomas which were negative for AFB.  He was last discharged on 05/07/19 with a chest tube to a pneumostat due to a persistent air leak.  He presented to [facility] this evening from home with complaints of SOB, cough, & worsening pain for several hours. He stated that pain at his chest tube site made it difficult for him to breathe.  As per the pt's daughter, the chest tube drainage has recently decreased and the chest tube site has begun to appear more red & swollen with purulent drainage. The pt was found to be tachycardic and febrile with a rectal temperature of 102F and a WBC of 13.9. He was transferred to [facility] with a concern for sepsis.  A CT scan of the chest was done prior to his transport and it was negative for PE and only showed expected post-op changes with a small R Hydro-PTX and the chest tube in place.  Here at [facility], the pt is comfortable and without complaints.  He states that he feels "good". (13 May 2019 23:55)          Allergies  No Known Allergies      MEDICATIONS:  aspirin  chewable 81 milliGRAM(s) Oral daily  clopidogrel Tablet 75 milliGRAM(s) Oral daily  heparin  Injectable 5000 Unit(s) SubCutaneous every 8 hours  isosorbide   mononitrate ER Tablet (IMDUR) 30 milliGRAM(s) Oral daily  metoprolol tartrate 25 milliGRAM(s) Oral two times a day  tamsulosin 0.4 milliGRAM(s) Oral <User Schedule>  cephalexin 500 milliGRAM(s) Oral four times a day  ALBUTerol/ipratropium for Nebulization 3 milliLiter(s) Nebulizer every 6 hours  acetaminophen   Tablet .. 650 milliGRAM(s) Oral every 6 hours PRN  oxyCODONE    IR 10 milliGRAM(s) Oral every 4 hours PRN  oxyCODONE    IR 5 milliGRAM(s) Oral every 4 hours PRN  atorvastatin 20 milliGRAM(s) Oral daily  dextrose 40% Gel 15 Gram(s) Oral once PRN  dextrose 50% Injectable 12.5 Gram(s) IV Push once  dextrose 50% Injectable 25 Gram(s) IV Push once  dextrose 50% Injectable 25 Gram(s) IV Push once  glucagon  Injectable 1 milliGRAM(s) IntraMuscular once PRN  insulin lispro (HumaLOG) corrective regimen sliding scale   SubCutaneous three times a day before meals  insulin lispro (HumaLOG) corrective regimen sliding scale   SubCutaneous at bedtime  metFORMIN 500 milliGRAM(s) Oral two times a day  dextrose 5%. 1000 milliLiter(s) IV Continuous <Continuous>  erythromycin   Ointment 1 Application(s) Both EYES three times a day    PAST MEDICAL & SURGICAL HISTORY:  Diabetes mellitus: type 2  Malignant neoplasm of lung: denies chemo and radiation  BPH (benign prostatic hyperplasia)  Smoking  Lung nodule: Adenocarcinoma; Necrotizing granulomas  AAA (abdominal aortic aneurysm)  MI (myocardial infarction)  Coronary insufficiency  HLD (hyperlipidemia)  HTN (hypertension)  History of lung biopsy: 3/2019  H/O knee surgery: rt knee meniscus  History of lung surgery: S/P Rt VATS wedge on 2018  History of ear surgery  S/P primary angioplasty with coronary stent: X1 2017, X2 2009    FAMILY HISTORY:  Family history of coronary artery disease (Sibling)    SOCIAL HISTORY:    NC    REVIEW OF SYSTEMS:  as above    PHYSICAL EXAM:  T(C): 36.8 (05-18-19 @ 05:11), Max: 37.4 (05-17-19 @ 20:28)  HR: 73 (05-18-19 @ 05:11) (69 - 161)  BP: 137/72 (05-18-19 @ 05:11) (112/63 - 147/65)  RR: 16 (05-18-19 @ 05:11) (16 - 22)  SpO2: 97% (05-18-19 @ 05:11) (95% - 100%)  Wt(kg): --  I&O's Summary    16 May 2019 07:01  -  17 May 2019 07:00  --------------------------------------------------------  IN: 450 mL / OUT: 1475 mL / NET: -1025 mL    17 May 2019 07:01  -  18 May 2019 06:26  --------------------------------------------------------  IN: 820 mL / OUT: 1495 mL / NET: -675 mL      Appearance: NAD	  HEENT:   Normal oral mucosa, PERRL, EOMI	  Cardiovascular: Normal S1 S2, No JVD, No murmurs, No edema  Respiratory: Lungs clear to auscultation	  Psychiatry: Awake, alert  Gastrointestinal:  Soft, Non-tender, + BS	  Skin: No rashes, No ecchymoses, No cyanosis	  Neurologic: Non-focal  Extremities: Normal range of motion, No clubbing, cyanosis or edema  Vascular: Peripheral pulses palpable 2+ bilaterally      LABS:	 	      05-17    140  |  103  |  11  ----------------------------<  199<H>  4.2   |  26  |  0.86    Ca    9.4      17 May 2019 12:54        proBNP:   Lipid Profile:   HgA1c:   TSH:       CARDIAC MARKERS:            TELEMETRY: 	    ECG:   	  RADIOLOGY:  OTHER: 	      PREVIOUS DIAGNOSTIC CARDIOVASCULAR TESTING:      [ ]  Echocardiogram:  [ ]  Catheterization:  [ ]  Stress test:    [ ]  Vascular studies: Cardiology/Vascular Medicine Inpatient Consultation Note    HISTORY OF PRESENT ILLNESS:  Patient is a 68 yo man s/p recent FB, Robotic Assisted RVATS, RML wedge x2, BAL and prior Robotic RVATS, RUL lobectomy and RML wedge with evidence of adenocarcinoma from the first surgery.  On this admission, the patient had presented to [facility] from home with complaints of SOB, cough, & worsening pain for several hours.  CTA chest performed prior to transport to [facility] was negative for PE and only showed expected post-op changes with a small R Hydro-PTX and the chest tube in place.  Here at [facility], the pt is comfortable and without complaints.  He states that he feels "good."    He also has a history of Afib but is currently on DAPT for his history of CAD with prior MI.    Telemetry this morning demonstrated sinus bradycardia and sinus rhythm, with occasional PVCs.  No evidence of atrial fibrillation.  On my exam, he appeared comfortable without complaints.    Reviewed with Thoracic Sx team.  With his CHADSVASC score of ~3, would ideally have patient treated with anticoagulation.  With his recent surgery and chest tube in place, can defer anticoagulation for now until hemostasis is assured.  We can revisit the option of starting anticoagulation along with antiplatelet monotherapy during an outpatient visit within the next 2 weeks. He is otherwise currently stable, in SR.    Allergies  No Known Allergies    MEDICATIONS:  aspirin  chewable 81 milliGRAM(s) Oral daily  clopidogrel Tablet 75 milliGRAM(s) Oral daily  heparin  Injectable 5000 Unit(s) SubCutaneous every 8 hours  isosorbide   mononitrate ER Tablet (IMDUR) 30 milliGRAM(s) Oral daily  metoprolol tartrate 25 milliGRAM(s) Oral two times a day  tamsulosin 0.4 milliGRAM(s) Oral <User Schedule>  cephalexin 500 milliGRAM(s) Oral four times a day  ALBUTerol/ipratropium for Nebulization 3 milliLiter(s) Nebulizer every 6 hours  acetaminophen   Tablet .. 650 milliGRAM(s) Oral every 6 hours PRN  oxyCODONE    IR 10 milliGRAM(s) Oral every 4 hours PRN  oxyCODONE    IR 5 milliGRAM(s) Oral every 4 hours PRN  atorvastatin 20 milliGRAM(s) Oral daily  dextrose 40% Gel 15 Gram(s) Oral once PRN  dextrose 50% Injectable 12.5 Gram(s) IV Push once  dextrose 50% Injectable 25 Gram(s) IV Push once  dextrose 50% Injectable 25 Gram(s) IV Push once  glucagon  Injectable 1 milliGRAM(s) IntraMuscular once PRN  insulin lispro (HumaLOG) corrective regimen sliding scale   SubCutaneous three times a day before meals  insulin lispro (HumaLOG) corrective regimen sliding scale   SubCutaneous at bedtime  metFORMIN 500 milliGRAM(s) Oral two times a day  dextrose 5%. 1000 milliLiter(s) IV Continuous <Continuous>  erythromycin   Ointment 1 Application(s) Both EYES three times a day    PAST MEDICAL & SURGICAL HISTORY:  Diabetes mellitus: type 2  Malignant neoplasm of lung: denies chemo and radiation  BPH (benign prostatic hyperplasia)  Smoking  Lung nodule: Adenocarcinoma; Necrotizing granulomas  AAA (abdominal aortic aneurysm)  MI (myocardial infarction)  Coronary insufficiency  HLD (hyperlipidemia)  HTN (hypertension)  History of lung biopsy: 3/2019  H/O knee surgery: rt knee meniscus  History of lung surgery: S/P Rt VATS wedge on 2018  History of ear surgery  S/P primary angioplasty with coronary stent: X1 2017, X2 2009    FAMILY HISTORY:  Family history of coronary artery disease (Sibling)    SOCIAL HISTORY:    NC    REVIEW OF SYSTEMS:  as above    PHYSICAL EXAM:  T(C): 36.8 (05-18-19 @ 05:11), Max: 37.4 (05-17-19 @ 20:28)  HR: 73 (05-18-19 @ 05:11) (69 - 161)  BP: 137/72 (05-18-19 @ 05:11) (112/63 - 147/65)  RR: 16 (05-18-19 @ 05:11) (16 - 22)  SpO2: 97% (05-18-19 @ 05:11) (95% - 100%)  Wt(kg): --  I&O's Summary    16 May 2019 07:01  -  17 May 2019 07:00  --------------------------------------------------------  IN: 450 mL / OUT: 1475 mL / NET: -1025 mL    17 May 2019 07:01  -  18 May 2019 06:26  --------------------------------------------------------  IN: 820 mL / OUT: 1495 mL / NET: -675 mL      Appearance: NAD	  HEENT:   Normal oral mucosa, PERRL, EOMI	  Cardiovascular: Normal S1 S2, No JVD, 2/6 SM  Respiratory: Coarse breath sounds bilaterally, decreased on right side >> left	  Psychiatry: Awake, alert  Gastrointestinal:  Soft, Obese, Non-tender, + BS	  Skin: No rashes, No ecchymoses, No cyanosis	  Neurologic: Non-focal  Extremities: Normal range of motion.    < from: Transthoracic Echocardiogram (03.23.18 @ 13:05) >    Patient name: OLSZEWSKI, WALDEMAR  YOB: 1950   Age: 67 (M)   MR#: [id number]  Study Date: 3/23/2018  Location: O/PSonographer: Meek Canseco [facility]  Study quality: Technically good  Referring Physician: Danny Escobedo MD / Williams Mejía MD  Blood Pressure: 133/71 mmHg  Height: 178 cm  Weight: 97 kg  BSA: 2.2 m2  ------------------------------------------------------------------------  PROCEDURE: Transthoracic echocardiogram with 2-D, M-Mode  and complete spectral and color flow Doppler.  INDICATION: Atherosclerotic heart disease of native  coronary artery without angina pectoris (I25.10)  ------------------------------------------------------------------------  DIMENSIONS:  Dimensions:     Normal Values:  LA:     3.5 cm    2.0 -4.0 cm  Ao:     2.8 cm    2.0 - 3.8 cm  SEPTUM: 1.0 cm    0.6 - 1.2 cm  PWT:    1.0 cm    0.6 - 1.1 cm  LVIDd:  6.0 cm    3.0 - 5.6 cm  LVIDs:  4.3 cm    1.8 - 4.0 cm  Derived Variables:  LVMI: 115 g/m2  RWT: 0.33  Fractional short: 28 %  Ejection Fraction (Visual Estimate): 50-55 %  ------------------------------------------------------------------------  OBSERVATIONS:  Mitral Valve: Mitral annular calcification, otherwise  normal mitral valve. Mild-moderate mitral regurgitation.  Aortic Root: Normal size aortic root. (Ao:2.8 cm).  Aortic Valve: Calcified trileaflet aortic valve with normal  opening. No aortic valve regurgitation seen.  Left Atrium: Normal left atrium.  LA volume index = 31  cc/m2.  Left Ventricle: Endocardium not well visualized; grossly  borderline-normal left ventricular systolic function.  Estimated LVEF 50-55%. Eccentric left ventricular  hypertrophy (dilated left ventricle with normal relative  wall thickness). Moderate diastolic dysfunction (Stage II).  Right Heart: Normal right atrium. Normal right ventricular  size and function. Normal tricuspid valve. Minimal  tricuspid regurgitation. Normal pulmonic valve.  Pericardium/PleuraNormal pericardium with no pericardial  effusion.  Hemodynamic: Estimated right ventricular systolic pressure  equals 44 mm Hg, assuming right atrial pressure equals 10  mm Hg, consistent with mild pulmonary hypertension.  ------------------------------------------------------------------------  CONCLUSIONS:  1. Mitral annular calcification, otherwise normal mitral  valve. Mild-moderate mitral regurgitation.  2. Eccentric left ventricular hypertrophy (dilated left  ventricle with normal relative wall thickness).  3. Endocardium not well visualized; grossly  borderline-normal left ventricular systolic function.  Estimated LVEF 50-55%.  4. Moderate diastolic dysfunction (Stage II).  5. Normal right ventricular size and function.  6. Normal tricuspid valve. Minimal tricuspid regurgitation.  7. Estimated pulmonary artery systolic pressure equals 44  mm Hg, assuming right atrial pressure equals 10  mm Hg,  consistent with mild pulmonary hypertension.  *** No previous Echo exam.  ------------------------------------------------------------------------  Confirmed on  3/23/2018- 14:26:32 by Danny Escobedo MD, [facility],  [facility], [facility]  ------------------------------------------------------------------------    < end of copied text >      < from: Xray Chest 1 View- PORTABLE-Routine (05.17.19 @ 07:39) >  EXAM:  XR CHEST PORTABLE ROUTINE 1V        PROCEDURE DATE:  May 17 2019         INTERPRETATION:  CLINICAL INFORMATION: Status post right VATS. Evaluate   for pleural effusions. Shortness of breath.    EXAM: AP portable chest     COMPARISON: Chest radiograph from 5/16/2019.    FINDINGS:    Chain sutures in the right midlung.    Bibasilar atelectasis.    Unchanged right pleural effusion.    Cardiomegaly.    IMPRESSION:     Unchanged right pleural effusion and bibasilar atelectasis.      LINDA GFOF M.D., RADIOLOGY RESIDENT  This document has been electronically signed.  MATY FENG M.D., ATTENDING RADIOLOGIST  This document has been electronically signed. May 17 2019 11:18AM      < from: CT Angio Chest w/ IV Cont (05.13.19 @ 19:34) >    EXAM:  CT ANGIO CHEST (W)AW IC                            PROCEDURE DATE:  05/13/2019            INTERPRETATION:  CLINICAL INFORMATION: Difficulty breathing, evaluate for   pulmonary embolism. Recent VATS for lung adenocarcinoma.     COMPARISON: Outside institution CT chest 2/23/2019.    PROCEDURE:   CT Angiography of the Chest.  87 ml of Omnipaque 350 was injected intravenously. 13 ml were discarded.  Sagittal and coronal reformats were performed as well as 3D (MIP)   reconstructions.    FINDINGS:  Motion degraded evaluation.     LUNGS AND AIRWAYS: Status post right upper lobectomy and right middle   lobe wedge resection. No parenchymal consolidation.     PLEURA: Small right hydropneumothorax. Right chest tube in place.    MEDIASTINUM AND CLARKE: No lymphadenopathy.    VESSELS: No central pulmonary embolism. Motion limits evaluation of the   segmental and subsegmental branches. No filling defect in the main or   lobar pulmonary arteries.     HEART: Heart size is normal. No pericardial effusion.    CHEST WALL AND LOWER NECK: Right-sided chest tube with associated   postsurgical changes including subcutaneous gas and edema.    VISUALIZED UPPER ABDOMEN: Within normal limits.    BONES: Multilevel degenerative changes.    IMPRESSION:     Motion limited study. No central pulmonary embolism.    Expected postoperative changes with small right hydropneumothorax. Right   chest tube in place.      MANASA GALLO M.D., RADIOLOGY RESIDENT  This document has been electronically signed.  LAUREN FINNEGAN M.D., ATTENDING RADIOLOGIST  This document has been electronically signed. May 13 2019  8:27PM

## 2019-05-18 NOTE — CONSULT NOTE ADULT - ASSESSMENT
Patient is a 68 yo man s/p recent FB, Robotic Assisted RVATS, RML wedge x2, BAL and prior Robotic RVATS, RUL lobectomy and RML wedge with evidence of adenocarcinoma from the first surgery.  On this admission, the patient had presented to [facility] from home with complaints of SOB, cough, & worsening pain for several hours.  CTA chest performed prior to transport to [facility] was negative for PE and only showed expected post-op changes with a small R Hydro-PTX and the chest tube in place.  Here at [facility], the pt is comfortable and without complaints.  He states that he feels "good."    He also has a history of Afib but is currently on DAPT for his history of CAD with prior MI.    Telemetry this morning demonstrated sinus bradycardia and sinus rhythm, with occasional PVCs.  No evidence of atrial fibrillation.  On my exam, he appeared comfortable without complaints.    Reviewed with Thoracic Sx team.  With his CHADSVASC score of ~3, would ideally have patient treated with anticoagulation.  With his recent surgery and chest tube in place, can defer anticoagulation for now until hemostasis is assured.  We can revisit the option of starting anticoagulation along with antiplatelet monotherapy during an outpatient visit within the next 2 weeks. He is otherwise currently stable, in SR.

## 2019-05-18 NOTE — DISCHARGE NOTE NURSING/CASE MANAGEMENT/SOCIAL WORK - NSDCFUADDAPPT_GEN_ALL_CORE_FT
See Dr Mejía in 2 weeks.  Call for an appointment.  Bring a new chest X-ray when you come.  SEe Dr. Phil Mejía Cardiologist in 1-2 weeks to discuss Afib while in hospital. Take new  cardiac medications.   See Urologist if delayed bladder emptying returns.

## 2019-05-18 NOTE — DISCHARGE NOTE NURSING/CASE MANAGEMENT/SOCIAL WORK - NSDCPEEMAIL_GEN_ALL_CORE
[facility] for Tobacco Control email [email or web address]

## 2019-05-18 NOTE — DISCHARGE NOTE NURSING/CASE MANAGEMENT/SOCIAL WORK - NSDCDPATPORTLINK_GEN_ALL_CORE
You can access the [facility] Portal, offered by [facility], by registering with the following website: [email or web address]

## 2019-05-19 ENCOUNTER — EMERGENCY (EMERGENCY)
Facility: HOSPITAL | Age: 69
LOS: 1 days | Discharge: HOME | End: 2019-05-19
Attending: STUDENT IN AN ORGANIZED HEALTH CARE EDUCATION/TRAINING PROGRAM | Admitting: STUDENT IN AN ORGANIZED HEALTH CARE EDUCATION/TRAINING PROGRAM
Payer: MEDICAID

## 2019-05-19 VITALS
HEART RATE: 63 BPM | TEMPERATURE: 97 F | SYSTOLIC BLOOD PRESSURE: 169 MMHG | RESPIRATION RATE: 16 BRPM | DIASTOLIC BLOOD PRESSURE: 68 MMHG | OXYGEN SATURATION: 98 %

## 2019-05-19 VITALS
TEMPERATURE: 98 F | RESPIRATION RATE: 20 BRPM | DIASTOLIC BLOOD PRESSURE: 114 MMHG | SYSTOLIC BLOOD PRESSURE: 177 MMHG | HEART RATE: 83 BPM | OXYGEN SATURATION: 96 %

## 2019-05-19 DIAGNOSIS — Z98.890 OTHER SPECIFIED POSTPROCEDURAL STATES: Chronic | ICD-10-CM

## 2019-05-19 DIAGNOSIS — Z95.5 PRESENCE OF CORONARY ANGIOPLASTY IMPLANT AND GRAFT: Chronic | ICD-10-CM

## 2019-05-19 PROBLEM — R91.1 SOLITARY PULMONARY NODULE: Chronic | Status: ACTIVE | Noted: 2018-03-21

## 2019-05-19 LAB
ALBUMIN SERPL ELPH-MCNC: 3.5 G/DL — SIGNIFICANT CHANGE UP (ref 3.3–5)
ALP SERPL-CCNC: 57 U/L — SIGNIFICANT CHANGE UP (ref 40–120)
ALT FLD-CCNC: 69 U/L — HIGH (ref 4–41)
ANION GAP SERPL CALC-SCNC: 11 MMO/L — SIGNIFICANT CHANGE UP (ref 7–14)
APPEARANCE UR: CLEAR — SIGNIFICANT CHANGE UP
APTT BLD: 25.4 SEC — LOW (ref 27.5–36.3)
AST SERPL-CCNC: 27 U/L — SIGNIFICANT CHANGE UP (ref 4–40)
BASOPHILS # BLD AUTO: 0.02 K/UL — SIGNIFICANT CHANGE UP (ref 0–0.2)
BASOPHILS NFR BLD AUTO: 0.3 % — SIGNIFICANT CHANGE UP (ref 0–2)
BILIRUB SERPL-MCNC: 0.4 MG/DL — SIGNIFICANT CHANGE UP (ref 0.2–1.2)
BILIRUB UR-MCNC: NEGATIVE — SIGNIFICANT CHANGE UP
BLOOD UR QL VISUAL: NEGATIVE — SIGNIFICANT CHANGE UP
BUN SERPL-MCNC: 14 MG/DL — SIGNIFICANT CHANGE UP (ref 7–23)
CALCIUM SERPL-MCNC: 9.2 MG/DL — SIGNIFICANT CHANGE UP (ref 8.4–10.5)
CHLORIDE SERPL-SCNC: 107 MMOL/L — SIGNIFICANT CHANGE UP (ref 98–107)
CO2 SERPL-SCNC: 24 MMOL/L — SIGNIFICANT CHANGE UP (ref 22–31)
COLOR SPEC: YELLOW — SIGNIFICANT CHANGE UP
CREAT SERPL-MCNC: 0.75 MG/DL — SIGNIFICANT CHANGE UP (ref 0.5–1.3)
EOSINOPHIL # BLD AUTO: 0.11 K/UL — SIGNIFICANT CHANGE UP (ref 0–0.5)
EOSINOPHIL NFR BLD AUTO: 1.4 % — SIGNIFICANT CHANGE UP (ref 0–6)
GLUCOSE SERPL-MCNC: 147 MG/DL — HIGH (ref 70–99)
GLUCOSE UR-MCNC: NEGATIVE — SIGNIFICANT CHANGE UP
HCT VFR BLD CALC: 35.6 % — LOW (ref 39–50)
HGB BLD-MCNC: 11.6 G/DL — LOW (ref 13–17)
IMM GRANULOCYTES NFR BLD AUTO: 0.3 % — SIGNIFICANT CHANGE UP (ref 0–1.5)
INR BLD: 1.21 — HIGH (ref 0.88–1.17)
KETONES UR-MCNC: NEGATIVE — SIGNIFICANT CHANGE UP
LEUKOCYTE ESTERASE UR-ACNC: NEGATIVE — SIGNIFICANT CHANGE UP
LYMPHOCYTES # BLD AUTO: 1.2 K/UL — SIGNIFICANT CHANGE UP (ref 1–3.3)
LYMPHOCYTES # BLD AUTO: 15.4 % — SIGNIFICANT CHANGE UP (ref 13–44)
MCHC RBC-ENTMCNC: 30.7 PG — SIGNIFICANT CHANGE UP (ref 27–34)
MCHC RBC-ENTMCNC: 32.6 % — SIGNIFICANT CHANGE UP (ref 32–36)
MCV RBC AUTO: 94.2 FL — SIGNIFICANT CHANGE UP (ref 80–100)
MONOCYTES # BLD AUTO: 0.48 K/UL — SIGNIFICANT CHANGE UP (ref 0–0.9)
MONOCYTES NFR BLD AUTO: 6.2 % — SIGNIFICANT CHANGE UP (ref 2–14)
NEUTROPHILS # BLD AUTO: 5.96 K/UL — SIGNIFICANT CHANGE UP (ref 1.8–7.4)
NEUTROPHILS NFR BLD AUTO: 76.4 % — SIGNIFICANT CHANGE UP (ref 43–77)
NITRITE UR-MCNC: NEGATIVE — SIGNIFICANT CHANGE UP
NRBC # FLD: 0 K/UL — SIGNIFICANT CHANGE UP (ref 0–0)
PH UR: 7 — SIGNIFICANT CHANGE UP (ref 5–8)
PLATELET # BLD AUTO: 282 K/UL — SIGNIFICANT CHANGE UP (ref 150–400)
PMV BLD: 10.4 FL — SIGNIFICANT CHANGE UP (ref 7–13)
POTASSIUM SERPL-MCNC: 3.9 MMOL/L — SIGNIFICANT CHANGE UP (ref 3.5–5.3)
POTASSIUM SERPL-SCNC: 3.9 MMOL/L — SIGNIFICANT CHANGE UP (ref 3.5–5.3)
PROT SERPL-MCNC: 6.9 G/DL — SIGNIFICANT CHANGE UP (ref 6–8.3)
PROT UR-MCNC: NEGATIVE — SIGNIFICANT CHANGE UP
PROTHROM AB SERPL-ACNC: 13.5 SEC — HIGH (ref 9.8–13.1)
RBC # BLD: 3.78 M/UL — LOW (ref 4.2–5.8)
RBC # FLD: 13.5 % — SIGNIFICANT CHANGE UP (ref 10.3–14.5)
SODIUM SERPL-SCNC: 142 MMOL/L — SIGNIFICANT CHANGE UP (ref 135–145)
SP GR SPEC: 1.02 — SIGNIFICANT CHANGE UP (ref 1–1.04)
UROBILINOGEN FLD QL: SIGNIFICANT CHANGE UP
WBC # BLD: 7.79 K/UL — SIGNIFICANT CHANGE UP (ref 3.8–10.5)
WBC # FLD AUTO: 7.79 K/UL — SIGNIFICANT CHANGE UP (ref 3.8–10.5)

## 2019-05-19 PROCEDURE — 74177 CT ABD & PELVIS W/CONTRAST: CPT | Mod: 26

## 2019-05-19 PROCEDURE — 99284 EMERGENCY DEPT VISIT MOD MDM: CPT

## 2019-05-19 RX ORDER — CEPHALEXIN 500 MG
500 CAPSULE ORAL ONCE
Refills: 0 | Status: COMPLETED | OUTPATIENT
Start: 2019-05-19 | End: 2019-05-19

## 2019-05-19 RX ADMIN — Medication 500 MILLIGRAM(S): at 12:07

## 2019-05-19 NOTE — ED PROVIDER NOTE - PROGRESS NOTE DETAILS
Atleast 600cc in bladder prior to johnson insertion. Dr. Maya: patient is much more comfortable after johnson placement, about 1000cc output. On exam, patient's abd soft, NTND.

## 2019-05-19 NOTE — ED PROVIDER NOTE - OBJECTIVE STATEMENT
69 year old male with a PMHx of CAD s/p stent placement 3 vessel (2017), AAA, BPH, diabetes, HTN, lung cancer s/p RVATS/Right Upper Lobe Wedge Resection on 3/26/18, right vats, Wedge resection, BAL 5/1/19 pw urinary retention for the past 2 days - unable to urinate at all yesterday. The patients daughter is at the bedside and states that he had similar symptoms after surgery where he was having incomplete voiding with post void residuals of 400cc - flomax increased prior to discharge. Endorsing chronic back pain - unchanged from normal. Pt ambulatory without assistance. Denies n/v/f/c, CP, SOB, dysuria, hematuria, melena, hematochezia, numbness/weakness tingling in extremities, saddle anesthesia, bowel incontinence.

## 2019-05-19 NOTE — ED ADULT TRIAGE NOTE - CHIEF COMPLAINT QUOTE
Patient c/o not voiding since yesterday, very uncomfortable in triage, bladder feels distended.  He had lung surg. on May 1 st, d/c d yesterday. Patient c/o not voiding since yesterday, very uncomfortable in triage, bladder feels distended.  He had lung surg. on May 1 st, d/c d yesterday.  H/O  hTN

## 2019-05-19 NOTE — ED PROVIDER NOTE - NSFOLLOWUPINSTRUCTIONS_ED_ALL_ED_FT
Please continue all home medications as directed. Follow up with your urologist within 72 hours for follow-up care. Return to ER for new or worsening symptoms.

## 2019-05-19 NOTE — ED ADULT NURSE NOTE - OBJECTIVE STATEMENT
patient alert ox3 came in c/o bladder distended and painful and unable to void since yesterday. skin warm ands  dry. breathing even and unlabored. NAD. Umana inserted with 14 Green[place]ic and drained about 1000 ml urine. labs done as ordered. pending results and pending cat scan.

## 2019-05-19 NOTE — ED ADULT NURSE NOTE - NSIMPLEMENTINTERV_GEN_ALL_ED
Implemented All Universal Safety Interventions:  [place] to call system. Call bell, personal items and telephone within reach. Instruct patient to call for assistance. Room bathroom lighting operational. Non-slip footwear when patient is off stretcher. Physically safe environment: no spills, clutter or unnecessary equipment. Stretcher in lowest position, wheels locked, appropriate side rails in place.

## 2019-05-19 NOTE — ED PROVIDER NOTE - CLINICAL SUMMARY MEDICAL DECISION MAKING FREE TEXT BOX
69 year old male with a PMHx of CAD s/p stent placement 3 vessel (2017), AAA, BPH, diabetes, HTN, lung cancer s/p RVATS/Right Upper Lobe Wedge Resection on 3/26/18, right vats, Wedge resection, BAL 5/1/19 pw urinary retention for the past 2 days - unable to urinate at all yesterday.   Plan: johnson, labs, ct r/o obstructing mass

## 2019-05-19 NOTE — ED ADULT NURSE NOTE - CHIEF COMPLAINT QUOTE
Patient c/o not voiding since yesterday, very uncomfortable in triage, bladder feels distended.  He had lung surg. on May 1 st, d/c d yesterday.  H/O  hTN

## 2019-05-19 NOTE — ED PROVIDER NOTE - PMH
AAA (abdominal aortic aneurysm)    BPH (benign prostatic hyperplasia)    Coronary insufficiency    Diabetes mellitus  type 2  HLD (hyperlipidemia)    HTN (hypertension)    Lung nodule  Adenocarcinoma; Necrotizing granulomas  Malignant neoplasm of lung  denies chemo and radiation  MI (myocardial infarction)    Smoking

## 2019-05-19 NOTE — ED PROVIDER NOTE - ATTENDING CONTRIBUTION TO CARE
68 yo male with PMH Lung ca s/p RVATS and wedge resection on 3/26/19 and BAL with 5/1/19 presents to ED for evaluation of urinary retention x 2 days. Daughter reports that patient has been unable to urinary all day yesterday. Daughter reports clare patient has had similar symptoms in the past immediately post op. Patient denies fevers, chills, nausea, vomiting, chest pain, shortness of breath, dysuria, hematuria.     Gen: no acute distress, well appearing, awake, alert and oriented x 3  Cardiac: regular rate and rhythm, +S1S2  Pulm: Clear to auscultation bilaterally  Abd: +ttp suprapubic with +distention and firmness, no guarding  Back: neg CVA ttp, nontender spine    MDM  Male with h/o lung ca, s/p recent surgery presents to ED for evaluation of urinary retention. will check labs, imaging, place johnson, reassess

## 2019-05-20 ENCOUNTER — EMERGENCY (EMERGENCY)
Facility: HOSPITAL | Age: 69
LOS: 1 days | Discharge: HOME | End: 2019-05-20
Attending: EMERGENCY MEDICINE | Admitting: EMERGENCY MEDICINE
Payer: MEDICAID

## 2019-05-20 VITALS
DIASTOLIC BLOOD PRESSURE: 77 MMHG | RESPIRATION RATE: 18 BRPM | SYSTOLIC BLOOD PRESSURE: 144 MMHG | TEMPERATURE: 98 F | OXYGEN SATURATION: 98 % | HEART RATE: 89 BPM

## 2019-05-20 VITALS
OXYGEN SATURATION: 99 % | RESPIRATION RATE: 17 BRPM | HEART RATE: 78 BPM | DIASTOLIC BLOOD PRESSURE: 76 MMHG | SYSTOLIC BLOOD PRESSURE: 143 MMHG | TEMPERATURE: 99 F

## 2019-05-20 DIAGNOSIS — Z98.890 OTHER SPECIFIED POSTPROCEDURAL STATES: Chronic | ICD-10-CM

## 2019-05-20 DIAGNOSIS — Z95.5 PRESENCE OF CORONARY ANGIOPLASTY IMPLANT AND GRAFT: Chronic | ICD-10-CM

## 2019-05-20 LAB
ALBUMIN SERPL ELPH-MCNC: 3.3 G/DL — SIGNIFICANT CHANGE UP (ref 3.3–5)
ALP SERPL-CCNC: 53 U/L — SIGNIFICANT CHANGE UP (ref 40–120)
ALT FLD-CCNC: 66 U/L — HIGH (ref 4–41)
ANION GAP SERPL CALC-SCNC: 13 MMO/L — SIGNIFICANT CHANGE UP (ref 7–14)
AST SERPL-CCNC: 27 U/L — SIGNIFICANT CHANGE UP (ref 4–40)
BASOPHILS # BLD AUTO: 0.03 K/UL — SIGNIFICANT CHANGE UP (ref 0–0.2)
BASOPHILS NFR BLD AUTO: 0.3 % — SIGNIFICANT CHANGE UP (ref 0–2)
BILIRUB SERPL-MCNC: 0.3 MG/DL — SIGNIFICANT CHANGE UP (ref 0.2–1.2)
BUN SERPL-MCNC: 22 MG/DL — SIGNIFICANT CHANGE UP (ref 7–23)
CALCIUM SERPL-MCNC: 9.5 MG/DL — SIGNIFICANT CHANGE UP (ref 8.4–10.5)
CHLORIDE SERPL-SCNC: 108 MMOL/L — HIGH (ref 98–107)
CO2 SERPL-SCNC: 22 MMOL/L — SIGNIFICANT CHANGE UP (ref 22–31)
CREAT SERPL-MCNC: 0.82 MG/DL — SIGNIFICANT CHANGE UP (ref 0.5–1.3)
EOSINOPHIL # BLD AUTO: 0.23 K/UL — SIGNIFICANT CHANGE UP (ref 0–0.5)
EOSINOPHIL NFR BLD AUTO: 2.5 % — SIGNIFICANT CHANGE UP (ref 0–6)
GLUCOSE SERPL-MCNC: 118 MG/DL — HIGH (ref 70–99)
HCT VFR BLD CALC: 34.5 % — LOW (ref 39–50)
HGB BLD-MCNC: 11.4 G/DL — LOW (ref 13–17)
IMM GRANULOCYTES NFR BLD AUTO: 0.3 % — SIGNIFICANT CHANGE UP (ref 0–1.5)
LYMPHOCYTES # BLD AUTO: 1.9 K/UL — SIGNIFICANT CHANGE UP (ref 1–3.3)
LYMPHOCYTES # BLD AUTO: 20.5 % — SIGNIFICANT CHANGE UP (ref 13–44)
MCHC RBC-ENTMCNC: 31 PG — SIGNIFICANT CHANGE UP (ref 27–34)
MCHC RBC-ENTMCNC: 33 % — SIGNIFICANT CHANGE UP (ref 32–36)
MCV RBC AUTO: 93.8 FL — SIGNIFICANT CHANGE UP (ref 80–100)
MONOCYTES # BLD AUTO: 0.69 K/UL — SIGNIFICANT CHANGE UP (ref 0–0.9)
MONOCYTES NFR BLD AUTO: 7.5 % — SIGNIFICANT CHANGE UP (ref 2–14)
NEUTROPHILS # BLD AUTO: 6.37 K/UL — SIGNIFICANT CHANGE UP (ref 1.8–7.4)
NEUTROPHILS NFR BLD AUTO: 68.9 % — SIGNIFICANT CHANGE UP (ref 43–77)
NRBC # FLD: 0.04 K/UL — SIGNIFICANT CHANGE UP (ref 0–0)
PLATELET # BLD AUTO: 322 K/UL — SIGNIFICANT CHANGE UP (ref 150–400)
PMV BLD: 10.2 FL — SIGNIFICANT CHANGE UP (ref 7–13)
POTASSIUM SERPL-MCNC: 4 MMOL/L — SIGNIFICANT CHANGE UP (ref 3.5–5.3)
POTASSIUM SERPL-SCNC: 4 MMOL/L — SIGNIFICANT CHANGE UP (ref 3.5–5.3)
PROT SERPL-MCNC: 6.7 G/DL — SIGNIFICANT CHANGE UP (ref 6–8.3)
RBC # BLD: 3.68 M/UL — LOW (ref 4.2–5.8)
RBC # FLD: 13.7 % — SIGNIFICANT CHANGE UP (ref 10.3–14.5)
SODIUM SERPL-SCNC: 143 MMOL/L — SIGNIFICANT CHANGE UP (ref 135–145)
SPECIMEN SOURCE: SIGNIFICANT CHANGE UP
WBC # BLD: 9.25 K/UL — SIGNIFICANT CHANGE UP (ref 3.8–10.5)
WBC # FLD AUTO: 9.25 K/UL — SIGNIFICANT CHANGE UP (ref 3.8–10.5)

## 2019-05-20 PROCEDURE — 99283 EMERGENCY DEPT VISIT LOW MDM: CPT

## 2019-05-20 RX ORDER — CEPHALEXIN 500 MG
500 CAPSULE ORAL ONCE
Refills: 0 | Status: COMPLETED | OUTPATIENT
Start: 2019-05-20 | End: 2019-05-20

## 2019-05-20 RX ORDER — ACETAMINOPHEN 500 MG
650 TABLET ORAL ONCE
Refills: 0 | Status: COMPLETED | OUTPATIENT
Start: 2019-05-20 | End: 2019-05-20

## 2019-05-20 RX ORDER — LIDOCAINE 4 G/100G
1 CREAM TOPICAL ONCE
Refills: 0 | Status: COMPLETED | OUTPATIENT
Start: 2019-05-20 | End: 2019-05-20

## 2019-05-20 RX ADMIN — Medication 500 MILLIGRAM(S): at 19:48

## 2019-05-20 RX ADMIN — LIDOCAINE 1 APPLICATION(S): 4 CREAM TOPICAL at 19:49

## 2019-05-20 RX ADMIN — Medication 650 MILLIGRAM(S): at 19:48

## 2019-05-20 NOTE — ED PROVIDER NOTE - CLINICAL SUMMARY MEDICAL DECISION MAKING FREE TEXT BOX
69M PMH CAD w/ stent, AAA, BPH, DM, HTN, lung ca s/p recent VATS/wedge resection p/w pain. Pt was dc'd from hospital a few days ago. Returned to ED yesterday w/ abd pain/decreased urination. Basic labs all grossly wnl. UA wnl. urine cx NGTD. Already on keflex. Had CT a/p w/ no acute pathology (pt has known AAA). Had johnson placed w/ relief of symptoms and dc'd. Returns today w/ feeling need to urinate, minimal suprapubic pain and distal penile pain since yesterday. Also urine is darker. No other systemic symptoms. Vitals wnl, exam as above.  ddx: Likely just mild discomfort 2/2 johnson catheter. Dark urine likely 2/2 small blood/johnson. Low suspicion for metabolic abnormalities. Clinically not in retention.  Basic labs, symptom control.  Had UA and urine cx yesterday, already on keflex, clinically no indication for repeat UA at this time.

## 2019-05-20 NOTE — ED PROVIDER NOTE - PROGRESS NOTE DETAILS
[place]: Labs grossly at baseline. Pain improving, has  f/u on wednesday, comfortable for dc, outpt pmd/gu f/u.

## 2019-05-20 NOTE — ED ADULT TRIAGE NOTE - CHIEF COMPLAINT QUOTE
p/t seen in Ed yesterday for urinary retention, p/t sent home with johnson/leg bag, back today with dark urine, lower abd pain and decreased urine output

## 2019-05-20 NOTE — ED PROVIDER NOTE - PHYSICAL EXAMINATION
no LE edema, normal equal distal pulses.  no penile skin color changes or swelling.   unofficial bedside sono: collapsed bladder around johnson balloon. Several hundred cc's of brown urine in leg bag.

## 2019-05-20 NOTE — ED PROVIDER NOTE - NSFOLLOWUPINSTRUCTIONS_ED_ALL_ED_FT
Can take tylenol every 6hrs as needed for fever/pain.  Stay well hydrated.  Return for persistent fever/vomit, uncontrolled pain, worsening breathing, unable to urinate.  Follow up with primary doctor within 1-2 days.   Follow up with urologist within 1-2 days.    Abdominal Pain    Many things can cause abdominal pain. Many times, abdominal pain is not caused by a disease and will improve without treatment. Your health care provider will do a physical exam to determine if there is a dangerous cause of your pain; blood tests and imaging may help determine the cause of your pain. However, in many cases, no cause may be found and you may need further testing as an outpatient. Monitor your abdominal pain for any changes.     SEEK IMMEDIATE MEDICAL CARE IF YOU HAVE ANY OF THE FOLLOWING SYMPTOMS: worsening abdominal pain, uncontrollable vomiting, profuse diarrhea, inability to have bowel movements or pass gas, black or bloody stools, fever accompanying chest pain or back pain, or fainting. These symptoms may represent a serious problem that is an emergency. Do not wait to see if the symptoms will go away. Get medical help right away. Call 911 and do not drive yourself to the hospital.

## 2019-05-20 NOTE — ED PROVIDER NOTE - OBJECTIVE STATEMENT
Larisa, prefers daughter at bedside to translate.   69M PMH CAD w/ stent, AAA, BPH, DM, HTN, lung ca s/p recent VATS/wedge resection p/w pain. Pt was dc'd from hospital a few days ago. Returned to ED yesterday w/ abd pain/decreased urination. Basic labs all grossly wnl. UA wnl. urine cx NGTD. Already on keflex. Had CT a/p w/ no acute pathology (pt has known AAA). Had johnson placed w/ relief of symptoms and dc'd. Returns today w/ feeling need to urinate, minimal suprapubic pain and distal penile pain since yesterday. Also urine is darker. Denies f/c, NVD, SOB/CP, LE pain/swelling, focal weakness/numbness.

## 2019-05-21 LAB — BACTERIA UR CULT: SIGNIFICANT CHANGE UP

## 2019-05-22 ENCOUNTER — APPOINTMENT (OUTPATIENT)
Dept: UROLOGY | Facility: CLINIC | Age: 69
End: 2019-05-22
Payer: MEDICAID

## 2019-05-22 PROCEDURE — 51798 US URINE CAPACITY MEASURE: CPT

## 2019-05-22 PROCEDURE — 99213 OFFICE O/P EST LOW 20 MIN: CPT | Mod: 25

## 2019-05-22 NOTE — ADDENDUM
[FreeTextEntry1] :  I, Linda Priest, acted solely as a scribe for Dr. Matt Pal. The documentation recorded by the scribe accurately reflects the service I personally performed and the decision by me.\par

## 2019-05-22 NOTE — ASSESSMENT
[FreeTextEntry1] : PVR today was 184mL.\par Risks/benefits of ThuLEP/HoLEP discussed.\par UDS ordered.\par Pt will RTO Friday morning for TOV.\par Consider reinserting catheter at that stage

## 2019-05-22 NOTE — HISTORY OF PRESENT ILLNESS
[FreeTextEntry1] : 69 year old male presents w/ acute urinary retention\par PMHx of lung CA of right lung and multiple lung nodules but recent bx was negative.\par Pt accompanied by daughter today.\par This pt was at the hospital on 05/19/19 for urinary retention s/p lung nodule resection on 05/18/19, and had a Umana catheter placed. Subsequently developed burning, penile pain, dark brown colored urine, has to strain in order to urinate with the catheter in place, and has severe pain on his lower abdomen. \par Prior to this, he had significant obstructive voiding symptoms including marked urgency and frequency.\par PVR today was 184mL.\par Risks/benefits of ThuLEP/HoLEP discussed.\par UDS ordered.\par Pt will RTO Friday morning for TOV.\par Consider reinserting catheter at that stage

## 2019-05-24 ENCOUNTER — OUTPATIENT (OUTPATIENT)
Dept: OUTPATIENT SERVICES | Facility: HOSPITAL | Age: 69
LOS: 1 days | End: 2019-05-24
Payer: MEDICAID

## 2019-05-24 ENCOUNTER — APPOINTMENT (OUTPATIENT)
Dept: UROLOGY | Facility: CLINIC | Age: 69
End: 2019-05-24
Payer: MEDICAID

## 2019-05-24 DIAGNOSIS — Z95.5 PRESENCE OF CORONARY ANGIOPLASTY IMPLANT AND GRAFT: Chronic | ICD-10-CM

## 2019-05-24 DIAGNOSIS — Z98.890 OTHER SPECIFIED POSTPROCEDURAL STATES: Chronic | ICD-10-CM

## 2019-05-24 DIAGNOSIS — R33.9 RETENTION OF URINE, UNSPECIFIED: ICD-10-CM

## 2019-05-24 PROCEDURE — 51702 INSERT TEMP BLADDER CATH: CPT

## 2019-05-24 PROCEDURE — 99213 OFFICE O/P EST LOW 20 MIN: CPT | Mod: 25

## 2019-05-24 PROCEDURE — 51798 US URINE CAPACITY MEASURE: CPT

## 2019-05-24 NOTE — ASSESSMENT
[FreeTextEntry1] : This pt was at the hospital on 05/19/19 for acute urinary retention s/p lung nodule resection on 05/18/19, and had a Umana catheter placed. His daughter removed the Umana at home\par Now presents for f/u for urinary retention.\par PVR today was >210mL, previously 184mL 2 days ago. \par We will reinsert catheter today.\par Scheduled for UDS and ThuLEP.\par

## 2019-05-24 NOTE — HISTORY OF PRESENT ILLNESS
[FreeTextEntry1] : 69 year old male presents for f/u for urinary retention.\par Pt accompanied by grandson today.\par PMHx of lung CA of right lung and multiple lung nodules but recent bx was negative.\par This pt was at the hospital on 05/19/19 for acute urinary retention s/p lung nodule resection on 05/18/19, and had a Umana catheter placed. His daughter removed the Umana at home\par Now presents for f/u for urinary retention.\par PVR today was >210mL, previously 184mL 2 days ago. \par We will reinsert catheter today.\par Scheduled for UDS and ThuLEP.\par \par

## 2019-05-26 LAB — BACTERIA UR CULT: NORMAL

## 2019-05-30 LAB
FUNGUS SPEC QL CULT: SIGNIFICANT CHANGE UP

## 2019-06-03 DIAGNOSIS — R33.9 RETENTION OF URINE, UNSPECIFIED: ICD-10-CM

## 2019-06-04 ENCOUNTER — APPOINTMENT (OUTPATIENT)
Dept: THORACIC SURGERY | Facility: CLINIC | Age: 69
End: 2019-06-04
Payer: MEDICAID

## 2019-06-04 VITALS
BODY MASS INDEX: 29.35 KG/M2 | OXYGEN SATURATION: 95 % | RESPIRATION RATE: 17 BRPM | HEART RATE: 91 BPM | DIASTOLIC BLOOD PRESSURE: 84 MMHG | SYSTOLIC BLOOD PRESSURE: 142 MMHG | WEIGHT: 193 LBS

## 2019-06-04 PROCEDURE — 99024 POSTOP FOLLOW-UP VISIT: CPT

## 2019-06-05 ENCOUNTER — APPOINTMENT (OUTPATIENT)
Dept: UROLOGY | Facility: CLINIC | Age: 69
End: 2019-06-05
Payer: MEDICAID

## 2019-06-05 ENCOUNTER — NON-APPOINTMENT (OUTPATIENT)
Age: 69
End: 2019-06-05

## 2019-06-05 ENCOUNTER — APPOINTMENT (OUTPATIENT)
Dept: CARDIOLOGY | Facility: CLINIC | Age: 69
End: 2019-06-05
Payer: MEDICAID

## 2019-06-05 ENCOUNTER — OUTPATIENT (OUTPATIENT)
Dept: OUTPATIENT SERVICES | Facility: HOSPITAL | Age: 69
LOS: 1 days | End: 2019-06-05
Payer: MEDICAID

## 2019-06-05 VITALS
SYSTOLIC BLOOD PRESSURE: 139 MMHG | HEIGHT: 68 IN | OXYGEN SATURATION: 96 % | BODY MASS INDEX: 29.25 KG/M2 | WEIGHT: 193 LBS | DIASTOLIC BLOOD PRESSURE: 81 MMHG | HEART RATE: 78 BPM

## 2019-06-05 VITALS — SYSTOLIC BLOOD PRESSURE: 182 MMHG | DIASTOLIC BLOOD PRESSURE: 71 MMHG | HEART RATE: 79 BPM | TEMPERATURE: 98.6 F

## 2019-06-05 DIAGNOSIS — Z98.890 OTHER SPECIFIED POSTPROCEDURAL STATES: Chronic | ICD-10-CM

## 2019-06-05 DIAGNOSIS — Z95.5 PRESENCE OF CORONARY ANGIOPLASTY IMPLANT AND GRAFT: Chronic | ICD-10-CM

## 2019-06-05 DIAGNOSIS — N32.81 OVERACTIVE BLADDER: ICD-10-CM

## 2019-06-05 DIAGNOSIS — R35.0 FREQUENCY OF MICTURITION: ICD-10-CM

## 2019-06-05 PROCEDURE — 51741 ELECTRO-UROFLOWMETRY FIRST: CPT

## 2019-06-05 PROCEDURE — 51741 ELECTRO-UROFLOWMETRY FIRST: CPT | Mod: 26

## 2019-06-05 PROCEDURE — 51797 INTRAABDOMINAL PRESSURE TEST: CPT | Mod: 26

## 2019-06-05 PROCEDURE — 51798 US URINE CAPACITY MEASURE: CPT

## 2019-06-05 PROCEDURE — 51797 INTRAABDOMINAL PRESSURE TEST: CPT

## 2019-06-05 PROCEDURE — 51784 ANAL/URINARY MUSCLE STUDY: CPT

## 2019-06-05 PROCEDURE — 51728 CYSTOMETROGRAM W/VP: CPT | Mod: 26

## 2019-06-05 PROCEDURE — 51784 ANAL/URINARY MUSCLE STUDY: CPT | Mod: 26

## 2019-06-05 PROCEDURE — 99212 OFFICE O/P EST SF 10 MIN: CPT | Mod: 25

## 2019-06-05 PROCEDURE — 99213 OFFICE O/P EST LOW 20 MIN: CPT

## 2019-06-05 PROCEDURE — 93000 ELECTROCARDIOGRAM COMPLETE: CPT

## 2019-06-05 PROCEDURE — 51728 CYSTOMETROGRAM W/VP: CPT

## 2019-06-06 ENCOUNTER — APPOINTMENT (OUTPATIENT)
Dept: UROLOGY | Facility: CLINIC | Age: 69
End: 2019-06-06

## 2019-06-09 NOTE — DISCUSSION/SUMMARY
[Coronary Artery Disease] : coronary artery disease [Hypertension] : hypertension [Stable] : stable [FreeTextEntry1] : \par Currently stable from a cardiovascular standpoint. Normotensive. Euvolemic. Stable CAD. No ischemic or CHF symptoms. ECG completed today and reviewed. Continue current medications. At this time, patient is considered an acceptable risk from a cardiac standpoint for the anticipated prostate surgery. Patient may hold clopidogrel for 5 days prior to surgery. Follow up in 4 months.

## 2019-06-09 NOTE — PHYSICAL EXAM
[General Appearance - Well Developed] : well developed [Normal Appearance] : normal appearance [Well Groomed] : well groomed [General Appearance - In No Acute Distress] : no acute distress [General Appearance - Well Nourished] : well nourished [No Deformities] : no deformities [Normal Conjunctiva] : the conjunctiva exhibited no abnormalities [Normal Oral Mucosa] : normal oral mucosa [Eyelids - No Xanthelasma] : the eyelids demonstrated no xanthelasmas [No Oral Pallor] : no oral pallor [No Oral Cyanosis] : no oral cyanosis [Respiration, Rhythm And Depth] : normal respiratory rhythm and effort [Exaggerated Use Of Accessory Muscles For Inspiration] : no accessory muscle use [Auscultation Breath Sounds / Voice Sounds] : lungs were clear to auscultation bilaterally [Heart Rate And Rhythm] : heart rate and rhythm were normal [Heart Sounds] : normal S1 and S2 [Murmurs] : no murmurs present [Edema] : no peripheral edema present [Abdomen Soft] : soft [Abdomen Tenderness] : non-tender [Abnormal Walk] : normal gait [Cyanosis, Localized] : no localized cyanosis [Skin Color & Pigmentation] : normal skin color and pigmentation [] : no rash [No Venous Stasis] : no venous stasis [Oriented To Time, Place, And Person] : oriented to person, place, and time [FreeTextEntry1] : no carotid bruits or JVD

## 2019-06-09 NOTE — REASON FOR VISIT
[Follow-Up - Clinic] : a clinic follow-up of [Coronary Artery Disease] : coronary artery disease [FreeTextEntry1] : pre-op clearance

## 2019-06-09 NOTE — HISTORY OF PRESENT ILLNESS
[FreeTextEntry1] : Doing well. Denies chest pain, shortness of breath or palpitations. Anticipating prostate surgery on July 2, 2019.

## 2019-06-12 LAB
ACID FAST STN SPEC: SIGNIFICANT CHANGE UP

## 2019-06-13 LAB
ACID FAST STN SPT: SIGNIFICANT CHANGE UP
ACID FAST STN SPT: SIGNIFICANT CHANGE UP

## 2019-06-27 ENCOUNTER — OUTPATIENT (OUTPATIENT)
Dept: OUTPATIENT SERVICES | Facility: HOSPITAL | Age: 69
LOS: 1 days | End: 2019-06-27
Payer: MEDICAID

## 2019-06-27 VITALS
RESPIRATION RATE: 16 BRPM | HEIGHT: 68 IN | DIASTOLIC BLOOD PRESSURE: 77 MMHG | SYSTOLIC BLOOD PRESSURE: 150 MMHG | TEMPERATURE: 97 F | OXYGEN SATURATION: 98 % | HEART RATE: 58 BPM | WEIGHT: 190.92 LBS

## 2019-06-27 DIAGNOSIS — I25.10 ATHEROSCLEROTIC HEART DISEASE OF NATIVE CORONARY ARTERY WITHOUT ANGINA PECTORIS: ICD-10-CM

## 2019-06-27 DIAGNOSIS — Z98.890 OTHER SPECIFIED POSTPROCEDURAL STATES: Chronic | ICD-10-CM

## 2019-06-27 DIAGNOSIS — E11.9 TYPE 2 DIABETES MELLITUS WITHOUT COMPLICATIONS: ICD-10-CM

## 2019-06-27 DIAGNOSIS — Z01.818 ENCOUNTER FOR OTHER PREPROCEDURAL EXAMINATION: ICD-10-CM

## 2019-06-27 DIAGNOSIS — Z87.09 PERSONAL HISTORY OF OTHER DISEASES OF THE RESPIRATORY SYSTEM: ICD-10-CM

## 2019-06-27 DIAGNOSIS — R33.9 RETENTION OF URINE, UNSPECIFIED: ICD-10-CM

## 2019-06-27 DIAGNOSIS — Z95.5 PRESENCE OF CORONARY ANGIOPLASTY IMPLANT AND GRAFT: Chronic | ICD-10-CM

## 2019-06-27 LAB
BLD GP AB SCN SERPL QL: NEGATIVE — SIGNIFICANT CHANGE UP
RH IG SCN BLD-IMP: POSITIVE — SIGNIFICANT CHANGE UP

## 2019-06-27 PROCEDURE — 86850 RBC ANTIBODY SCREEN: CPT

## 2019-06-27 PROCEDURE — 86900 BLOOD TYPING SEROLOGIC ABO: CPT

## 2019-06-27 PROCEDURE — G0463: CPT

## 2019-06-27 PROCEDURE — 87086 URINE CULTURE/COLONY COUNT: CPT

## 2019-06-27 PROCEDURE — 86901 BLOOD TYPING SEROLOGIC RH(D): CPT

## 2019-06-27 PROCEDURE — 87186 SC STD MICRODIL/AGAR DIL: CPT

## 2019-06-27 RX ORDER — ISOSORBIDE DINITRATE 5 MG/1
1 TABLET ORAL
Qty: 0 | Refills: 0 | DISCHARGE

## 2019-06-27 RX ORDER — TAMSULOSIN HYDROCHLORIDE 0.4 MG/1
1 CAPSULE ORAL
Qty: 0 | Refills: 0 | DISCHARGE

## 2019-06-27 RX ORDER — ATORVASTATIN CALCIUM 80 MG/1
1 TABLET, FILM COATED ORAL
Qty: 0 | Refills: 0 | DISCHARGE

## 2019-06-27 RX ORDER — AMLODIPINE BESYLATE 2.5 MG/1
1 TABLET ORAL
Qty: 0 | Refills: 0 | DISCHARGE

## 2019-06-27 RX ORDER — METFORMIN HYDROCHLORIDE 850 MG/1
2 TABLET ORAL
Qty: 0 | Refills: 0 | DISCHARGE

## 2019-06-27 RX ORDER — ACETAMINOPHEN 500 MG
2 TABLET ORAL
Qty: 0 | Refills: 0 | DISCHARGE

## 2019-06-27 RX ORDER — ASPIRIN/CALCIUM CARB/MAGNESIUM 324 MG
1 TABLET ORAL
Qty: 0 | Refills: 0 | DISCHARGE

## 2019-06-27 RX ORDER — DOCUSATE SODIUM 100 MG
1 CAPSULE ORAL
Qty: 0 | Refills: 0 | DISCHARGE

## 2019-06-27 NOTE — H&P PST ADULT - NSICDXPASTMEDICALHX_GEN_ALL_CORE_FT
PAST MEDICAL HISTORY:  AAA (abdominal aortic aneurysm)     BPH (benign prostatic hyperplasia)     Coronary insufficiency     Diabetes mellitus type 2    HLD (hyperlipidemia)     HTN (hypertension)     Lung nodule     Malignant neoplasm of lung denies chemo and radiation    MI (myocardial infarction)     Smoking PAST MEDICAL HISTORY:  AAA (abdominal aortic aneurysm)     BPH (benign prostatic hyperplasia)     Coronary insufficiency     Diabetes mellitus type 2    H/O urinary retention     HLD (hyperlipidemia)     HTN (hypertension)     Lung nodule Adenocarcinoma; Necrotizing granulomas    Malignant neoplasm of lung denies chemo and radiation, right lung    MI (myocardial infarction) 2004    Mild pulmonary hypertension Echo and PFT's in chart    Smoking PAST MEDICAL HISTORY:  AAA (abdominal aortic aneurysm)     Asthma, mild     BPH (benign prostatic hyperplasia)     Coronary insufficiency     Diabetes mellitus type 2    H/O urinary retention     HLD (hyperlipidemia)     HTN (hypertension)     Lung nodule Adenocarcinoma; Necrotizing granulomas    Malignant neoplasm of lung denies chemo and radiation, right lung    MI (myocardial infarction) 2004    Mild pulmonary hypertension Echo and PFT's in chart    Smoking

## 2019-06-27 NOTE — H&P PST ADULT - NSICDXPASTSURGICALHX_GEN_ALL_CORE_FT
PAST SURGICAL HISTORY:  H/O knee surgery rt knee meniscus    History of ear surgery     History of lung biopsy 3/2019    History of lung surgery S/P Rt VATS wedge on 2018    S/P primary angioplasty with coronary stent X1 2017, X2 2009 PAST SURGICAL HISTORY:  H/O knee surgery rt knee meniscus    History of ear surgery     History of lung biopsy 3/2019 excsion of right benign lung nodule 5/2019    History of lung surgery S/P Rt VATS wedge on 2018    S/P primary angioplasty with coronary stent X1 2017, X2 2009

## 2019-06-27 NOTE — H&P PST ADULT - NSICDXPROBLEM_GEN_ALL_CORE_FT
PROBLEM DIAGNOSES  Problem: Urinary retention  Assessment and Plan: Cystoscopy, laser enucleation of the prostate with /without morcellation   Call PMD for labs drawn last week, EKG, Echo and PFTs are in chart  M/eval, and Card eval in chart , Cardiothoracic  note  in HIE      Problem: CAD (coronary atherosclerotic disease)  Assessment and Plan: Pt was instructed to stop Plavix 5 days before surgery and continue ASA    Problem: T2DM (type 2 diabetes mellitus)  Assessment and Plan: Check FS preop PROBLEM DIAGNOSES  Problem: Urinary retention  Assessment and Plan: Cystoscopy, laser enucleation of the prostate with /without morcellation   Call PMD for labs drawn last week, EKG, Echo and PFTs are in chart. urine cx and type & screen sent to lab.  M/eval, and Card eval in chart , Cardiothoracic  note  in HIE      Problem: CAD (coronary atherosclerotic disease)  Assessment and Plan: Pt was instructed to stop Plavix 5 days before surgery and continue ASA    Problem: T2DM (type 2 diabetes mellitus)  Assessment and Plan: Check FS preop

## 2019-06-27 NOTE — H&P PST ADULT - OTHER CARE PROVIDERS
Dr Mejía Cardio [phone] Dr Constantino Mejía Thoracic surgeon Dr Mejía Cardio [phone] Dr Constantino Mejía Thoracic surgeon, Dr Sepulveda Pulmonologist  [facility]

## 2019-06-27 NOTE — H&P PST ADULT - HISTORY OF PRESENT ILLNESS
70 Y/O Female 68 Y/O Polish speaking male H/O Lung CA S/P right upper lobectomy 3/2018, S/P Vats excision right upper lobe benign nodule (pox 98% on Room air) , CAD s/p Coronary artery stents 5/2017, HTN, COPD, HLD,  DM2. Pt daughter reports S/P Vats pt had urinary retention after discharge. Pt went to [facility] 5/19/19 insertion of johnson which daughter removed after 3 weeks at home. Presents Cystoscopy , laser enucleation of the prostate with/without morcellation 7/2/19. 68 Y/O Polish speaking male H/O Lung CA S/P right upper lobectomy 3/2018, S/P  excision right lung benign nodule (pox 98% on Room air) 5/19  , CAD s/p Coronary artery stents 5/2017, HTN, asthma controlled ,  DM2. Pt daughter reports S/P Vats pt had urinary retention after discharge. Pt went to [facility] 5/19/19 insertion of johnson which daughter removed after 3 weeks at home. Pt denies any UTI, fever , chills, N/V, hematuria, SOB, or CP.  Presents Cystoscopy , laser enucleation of the prostate with/without morcellation 7/2/19.

## 2019-06-29 LAB
-  AMIKACIN: SIGNIFICANT CHANGE UP
-  AZTREONAM: SIGNIFICANT CHANGE UP
-  CEFEPIME: SIGNIFICANT CHANGE UP
-  CEFTAZIDIME: SIGNIFICANT CHANGE UP
-  CIPROFLOXACIN: SIGNIFICANT CHANGE UP
-  GENTAMICIN: SIGNIFICANT CHANGE UP
-  IMIPENEM: SIGNIFICANT CHANGE UP
-  LEVOFLOXACIN: SIGNIFICANT CHANGE UP
-  MEROPENEM: SIGNIFICANT CHANGE UP
-  PIPERACILLIN/TAZOBACTAM: SIGNIFICANT CHANGE UP
-  TOBRAMYCIN: SIGNIFICANT CHANGE UP
CULTURE RESULTS: SIGNIFICANT CHANGE UP
METHOD TYPE: SIGNIFICANT CHANGE UP
ORGANISM # SPEC MICROSCOPIC CNT: SIGNIFICANT CHANGE UP
ORGANISM # SPEC MICROSCOPIC CNT: SIGNIFICANT CHANGE UP
SPECIMEN SOURCE: SIGNIFICANT CHANGE UP

## 2019-07-01 ENCOUNTER — OUTPATIENT (OUTPATIENT)
Dept: OUTPATIENT SERVICES | Facility: HOSPITAL | Age: 69
LOS: 1 days | End: 2019-07-01

## 2019-07-01 DIAGNOSIS — Z98.890 OTHER SPECIFIED POSTPROCEDURAL STATES: Chronic | ICD-10-CM

## 2019-07-01 DIAGNOSIS — Z95.5 PRESENCE OF CORONARY ANGIOPLASTY IMPLANT AND GRAFT: Chronic | ICD-10-CM

## 2019-07-01 RX ORDER — MOXIFLOXACIN HYDROCHLORIDE TABLETS, 400 MG 400 MG/1
1 TABLET, FILM COATED ORAL
Qty: 0 | Refills: 0 | DISCHARGE
Start: 2019-07-01

## 2019-07-02 ENCOUNTER — APPOINTMENT (OUTPATIENT)
Dept: UROLOGY | Facility: HOSPITAL | Age: 69
End: 2019-07-02

## 2019-07-02 ENCOUNTER — INPATIENT (INPATIENT)
Facility: HOSPITAL | Age: 69
LOS: 0 days | Discharge: HOME | DRG: 714 | End: 2019-07-03
Attending: UROLOGY | Admitting: UROLOGY
Payer: MEDICAID

## 2019-07-02 ENCOUNTER — RESULT REVIEW (OUTPATIENT)
Age: 69
End: 2019-07-02

## 2019-07-02 VITALS
HEART RATE: 58 BPM | WEIGHT: 198.42 LBS | OXYGEN SATURATION: 100 % | TEMPERATURE: 98 F | SYSTOLIC BLOOD PRESSURE: 149 MMHG | DIASTOLIC BLOOD PRESSURE: 73 MMHG | RESPIRATION RATE: 16 BRPM | HEIGHT: 69.29 IN

## 2019-07-02 DIAGNOSIS — Z95.5 PRESENCE OF CORONARY ANGIOPLASTY IMPLANT AND GRAFT: Chronic | ICD-10-CM

## 2019-07-02 DIAGNOSIS — I25.10 ATHEROSCLEROTIC HEART DISEASE OF NATIVE CORONARY ARTERY WITHOUT ANGINA PECTORIS: ICD-10-CM

## 2019-07-02 DIAGNOSIS — Z98.890 OTHER SPECIFIED POSTPROCEDURAL STATES: Chronic | ICD-10-CM

## 2019-07-02 DIAGNOSIS — Z01.818 ENCOUNTER FOR OTHER PREPROCEDURAL EXAMINATION: ICD-10-CM

## 2019-07-02 LAB
ANION GAP SERPL CALC-SCNC: 10 MMOL/L — SIGNIFICANT CHANGE UP (ref 5–17)
BUN SERPL-MCNC: 12 MG/DL — SIGNIFICANT CHANGE UP (ref 7–23)
CALCIUM SERPL-MCNC: 8.3 MG/DL — LOW (ref 8.4–10.5)
CHLORIDE SERPL-SCNC: 107 MMOL/L — SIGNIFICANT CHANGE UP (ref 96–108)
CO2 SERPL-SCNC: 24 MMOL/L — SIGNIFICANT CHANGE UP (ref 22–31)
CREAT SERPL-MCNC: 0.77 MG/DL — SIGNIFICANT CHANGE UP (ref 0.5–1.3)
GLUCOSE BLDC GLUCOMTR-MCNC: 127 MG/DL — HIGH (ref 70–99)
GLUCOSE BLDC GLUCOMTR-MCNC: 199 MG/DL — HIGH (ref 70–99)
GLUCOSE BLDC GLUCOMTR-MCNC: 252 MG/DL — HIGH (ref 70–99)
GLUCOSE SERPL-MCNC: 152 MG/DL — HIGH (ref 70–99)
HCT VFR BLD CALC: 37.3 % — LOW (ref 39–50)
HGB BLD-MCNC: 12.7 G/DL — LOW (ref 13–17)
MCHC RBC-ENTMCNC: 32.9 PG — SIGNIFICANT CHANGE UP (ref 27–34)
MCHC RBC-ENTMCNC: 34 GM/DL — SIGNIFICANT CHANGE UP (ref 32–36)
MCV RBC AUTO: 96.5 FL — SIGNIFICANT CHANGE UP (ref 80–100)
PLATELET # BLD AUTO: 147 K/UL — LOW (ref 150–400)
POTASSIUM SERPL-MCNC: 4.1 MMOL/L — SIGNIFICANT CHANGE UP (ref 3.5–5.3)
POTASSIUM SERPL-SCNC: 4.1 MMOL/L — SIGNIFICANT CHANGE UP (ref 3.5–5.3)
RBC # BLD: 3.86 M/UL — LOW (ref 4.2–5.8)
RBC # FLD: 13 % — SIGNIFICANT CHANGE UP (ref 10.3–14.5)
RH IG SCN BLD-IMP: POSITIVE — SIGNIFICANT CHANGE UP
SODIUM SERPL-SCNC: 141 MMOL/L — SIGNIFICANT CHANGE UP (ref 135–145)
WBC # BLD: 6 K/UL — SIGNIFICANT CHANGE UP (ref 3.8–10.5)
WBC # FLD AUTO: 6 K/UL — SIGNIFICANT CHANGE UP (ref 3.8–10.5)

## 2019-07-02 PROCEDURE — 88305 TISSUE EXAM BY PATHOLOGIST: CPT | Mod: 26

## 2019-07-02 RX ORDER — ACETAMINOPHEN 500 MG
650 TABLET ORAL EVERY 6 HOURS
Refills: 0 | Status: DISCONTINUED | OUTPATIENT
Start: 2019-07-02 | End: 2019-07-03

## 2019-07-02 RX ORDER — OXYCODONE AND ACETAMINOPHEN 5; 325 MG/1; MG/1
2 TABLET ORAL EVERY 4 HOURS
Refills: 0 | Status: DISCONTINUED | OUTPATIENT
Start: 2019-07-02 | End: 2019-07-03

## 2019-07-02 RX ORDER — ASPIRIN/CALCIUM CARB/MAGNESIUM 324 MG
1 TABLET ORAL
Qty: 0 | Refills: 0 | DISCHARGE

## 2019-07-02 RX ORDER — SODIUM CHLORIDE 9 MG/ML
1000 INJECTION, SOLUTION INTRAVENOUS
Refills: 0 | Status: DISCONTINUED | OUTPATIENT
Start: 2019-07-02 | End: 2019-07-03

## 2019-07-02 RX ORDER — CEFAZOLIN SODIUM 1 G
2000 VIAL (EA) INJECTION ONCE
Refills: 0 | Status: DISCONTINUED | OUTPATIENT
Start: 2019-07-02 | End: 2019-07-02

## 2019-07-02 RX ORDER — FUROSEMIDE 40 MG
10 TABLET ORAL ONCE
Refills: 0 | Status: COMPLETED | OUTPATIENT
Start: 2019-07-03 | End: 2019-07-03

## 2019-07-02 RX ORDER — METFORMIN HYDROCHLORIDE 850 MG/1
2 TABLET ORAL
Qty: 0 | Refills: 0 | DISCHARGE

## 2019-07-02 RX ORDER — FUROSEMIDE 40 MG
10 TABLET ORAL ONCE
Refills: 0 | Status: COMPLETED | OUTPATIENT
Start: 2019-07-02 | End: 2019-07-02

## 2019-07-02 RX ORDER — AMLODIPINE BESYLATE 2.5 MG/1
5 TABLET ORAL DAILY
Refills: 0 | Status: DISCONTINUED | OUTPATIENT
Start: 2019-07-02 | End: 2019-07-03

## 2019-07-02 RX ORDER — ASPIRIN/CALCIUM CARB/MAGNESIUM 324 MG
81 TABLET ORAL DAILY
Refills: 0 | Status: DISCONTINUED | OUTPATIENT
Start: 2019-07-02 | End: 2019-07-03

## 2019-07-02 RX ORDER — ISOSORBIDE MONONITRATE 60 MG/1
30 TABLET, EXTENDED RELEASE ORAL DAILY
Refills: 0 | Status: DISCONTINUED | OUTPATIENT
Start: 2019-07-02 | End: 2019-07-03

## 2019-07-02 RX ORDER — DEXTROSE 50 % IN WATER 50 %
25 SYRINGE (ML) INTRAVENOUS ONCE
Refills: 0 | Status: DISCONTINUED | OUTPATIENT
Start: 2019-07-02 | End: 2019-07-03

## 2019-07-02 RX ORDER — SODIUM CHLORIDE 9 MG/ML
1000 INJECTION INTRAMUSCULAR; INTRAVENOUS; SUBCUTANEOUS
Refills: 0 | Status: DISCONTINUED | OUTPATIENT
Start: 2019-07-02 | End: 2019-07-03

## 2019-07-02 RX ORDER — SODIUM CHLORIDE 9 MG/ML
3 INJECTION INTRAMUSCULAR; INTRAVENOUS; SUBCUTANEOUS EVERY 8 HOURS
Refills: 0 | Status: DISCONTINUED | OUTPATIENT
Start: 2019-07-02 | End: 2019-07-02

## 2019-07-02 RX ORDER — DOCUSATE SODIUM 100 MG
100 CAPSULE ORAL THREE TIMES A DAY
Refills: 0 | Status: DISCONTINUED | OUTPATIENT
Start: 2019-07-02 | End: 2019-07-03

## 2019-07-02 RX ORDER — LIDOCAINE 4 G/100G
1 CREAM TOPICAL
Refills: 0 | Status: DISCONTINUED | OUTPATIENT
Start: 2019-07-02 | End: 2019-07-03

## 2019-07-02 RX ORDER — DEXTROSE 50 % IN WATER 50 %
12.5 SYRINGE (ML) INTRAVENOUS ONCE
Refills: 0 | Status: DISCONTINUED | OUTPATIENT
Start: 2019-07-02 | End: 2019-07-03

## 2019-07-02 RX ORDER — AMLODIPINE BESYLATE 2.5 MG/1
1 TABLET ORAL
Qty: 0 | Refills: 0 | DISCHARGE

## 2019-07-02 RX ORDER — GLUCAGON INJECTION, SOLUTION 0.5 MG/.1ML
1 INJECTION, SOLUTION SUBCUTANEOUS ONCE
Refills: 0 | Status: DISCONTINUED | OUTPATIENT
Start: 2019-07-02 | End: 2019-07-03

## 2019-07-02 RX ORDER — INSULIN LISPRO 100/ML
VIAL (ML) SUBCUTANEOUS AT BEDTIME
Refills: 0 | Status: DISCONTINUED | OUTPATIENT
Start: 2019-07-02 | End: 2019-07-03

## 2019-07-02 RX ORDER — METOPROLOL TARTRATE 50 MG
25 TABLET ORAL
Refills: 0 | Status: DISCONTINUED | OUTPATIENT
Start: 2019-07-02 | End: 2019-07-03

## 2019-07-02 RX ORDER — OXYCODONE AND ACETAMINOPHEN 5; 325 MG/1; MG/1
1 TABLET ORAL EVERY 4 HOURS
Refills: 0 | Status: DISCONTINUED | OUTPATIENT
Start: 2019-07-02 | End: 2019-07-03

## 2019-07-02 RX ORDER — ISOSORBIDE MONONITRATE 60 MG/1
1 TABLET, EXTENDED RELEASE ORAL
Qty: 0 | Refills: 0 | DISCHARGE

## 2019-07-02 RX ORDER — HEPARIN SODIUM 5000 [USP'U]/ML
5000 INJECTION INTRAVENOUS; SUBCUTANEOUS EVERY 8 HOURS
Refills: 0 | Status: DISCONTINUED | OUTPATIENT
Start: 2019-07-02 | End: 2019-07-03

## 2019-07-02 RX ORDER — INSULIN LISPRO 100/ML
VIAL (ML) SUBCUTANEOUS
Refills: 0 | Status: DISCONTINUED | OUTPATIENT
Start: 2019-07-02 | End: 2019-07-03

## 2019-07-02 RX ORDER — CIPROFLOXACIN LACTATE 400MG/40ML
400 VIAL (ML) INTRAVENOUS EVERY 12 HOURS
Refills: 0 | Status: DISCONTINUED | OUTPATIENT
Start: 2019-07-02 | End: 2019-07-03

## 2019-07-02 RX ORDER — LIDOCAINE HCL 20 MG/ML
0.2 VIAL (ML) INJECTION ONCE
Refills: 0 | Status: DISCONTINUED | OUTPATIENT
Start: 2019-07-02 | End: 2019-07-02

## 2019-07-02 RX ORDER — CLOPIDOGREL BISULFATE 75 MG/1
1 TABLET, FILM COATED ORAL
Qty: 0 | Refills: 0 | DISCHARGE

## 2019-07-02 RX ORDER — SENNA PLUS 8.6 MG/1
1 TABLET ORAL AT BEDTIME
Refills: 0 | Status: DISCONTINUED | OUTPATIENT
Start: 2019-07-02 | End: 2019-07-03

## 2019-07-02 RX ORDER — DEXTROSE 50 % IN WATER 50 %
15 SYRINGE (ML) INTRAVENOUS ONCE
Refills: 0 | Status: DISCONTINUED | OUTPATIENT
Start: 2019-07-02 | End: 2019-07-03

## 2019-07-02 RX ORDER — TAMSULOSIN HYDROCHLORIDE 0.4 MG/1
1 CAPSULE ORAL
Qty: 0 | Refills: 0 | DISCHARGE

## 2019-07-02 RX ADMIN — Medication 2: at 17:58

## 2019-07-02 RX ADMIN — Medication 25 MILLIGRAM(S): at 18:17

## 2019-07-02 RX ADMIN — SENNA PLUS 1 TABLET(S): 8.6 TABLET ORAL at 23:10

## 2019-07-02 RX ADMIN — Medication 200 MILLIGRAM(S): at 23:09

## 2019-07-02 RX ADMIN — Medication 100 MILLIGRAM(S): at 23:10

## 2019-07-02 RX ADMIN — Medication 2: at 23:09

## 2019-07-02 RX ADMIN — HEPARIN SODIUM 5000 UNIT(S): 5000 INJECTION INTRAVENOUS; SUBCUTANEOUS at 18:17

## 2019-07-02 RX ADMIN — Medication 10 MILLIGRAM(S): at 15:21

## 2019-07-03 ENCOUNTER — TRANSCRIPTION ENCOUNTER (OUTPATIENT)
Age: 69
End: 2019-07-03

## 2019-07-03 VITALS
DIASTOLIC BLOOD PRESSURE: 73 MMHG | HEART RATE: 65 BPM | SYSTOLIC BLOOD PRESSURE: 126 MMHG | OXYGEN SATURATION: 93 % | RESPIRATION RATE: 18 BRPM | TEMPERATURE: 99 F

## 2019-07-03 PROBLEM — I27.20 PULMONARY HYPERTENSION, UNSPECIFIED: Chronic | Status: ACTIVE | Noted: 2019-06-27

## 2019-07-03 PROBLEM — Z87.898 PERSONAL HISTORY OF OTHER SPECIFIED CONDITIONS: Chronic | Status: ACTIVE | Noted: 2019-06-27

## 2019-07-03 PROBLEM — C34.90 MALIGNANT NEOPLASM OF UNSPECIFIED PART OF UNSPECIFIED BRONCHUS OR LUNG: Chronic | Status: ACTIVE | Noted: 2019-04-22

## 2019-07-03 PROBLEM — I21.3 ST ELEVATION (STEMI) MYOCARDIAL INFARCTION OF UNSPECIFIED SITE: Chronic | Status: ACTIVE | Noted: 2017-05-02

## 2019-07-03 PROBLEM — J45.909 UNSPECIFIED ASTHMA, UNCOMPLICATED: Chronic | Status: ACTIVE | Noted: 2019-06-27

## 2019-07-03 LAB
ANION GAP SERPL CALC-SCNC: 13 MMOL/L — SIGNIFICANT CHANGE UP (ref 5–17)
BASOPHILS # BLD AUTO: 0 K/UL — SIGNIFICANT CHANGE UP (ref 0–0.2)
BASOPHILS NFR BLD AUTO: 0.4 % — SIGNIFICANT CHANGE UP (ref 0–2)
BUN SERPL-MCNC: 14 MG/DL — SIGNIFICANT CHANGE UP (ref 7–23)
CALCIUM SERPL-MCNC: 9 MG/DL — SIGNIFICANT CHANGE UP (ref 8.4–10.5)
CHLORIDE SERPL-SCNC: 105 MMOL/L — SIGNIFICANT CHANGE UP (ref 96–108)
CO2 SERPL-SCNC: 24 MMOL/L — SIGNIFICANT CHANGE UP (ref 22–31)
CREAT SERPL-MCNC: 0.81 MG/DL — SIGNIFICANT CHANGE UP (ref 0.5–1.3)
EOSINOPHIL # BLD AUTO: 0 K/UL — SIGNIFICANT CHANGE UP (ref 0–0.5)
EOSINOPHIL NFR BLD AUTO: 0.3 % — SIGNIFICANT CHANGE UP (ref 0–6)
GLUCOSE BLDC GLUCOMTR-MCNC: 112 MG/DL — HIGH (ref 70–99)
GLUCOSE BLDC GLUCOMTR-MCNC: 131 MG/DL — HIGH (ref 70–99)
GLUCOSE BLDC GLUCOMTR-MCNC: 177 MG/DL — HIGH (ref 70–99)
GLUCOSE SERPL-MCNC: 142 MG/DL — HIGH (ref 70–99)
HCT VFR BLD CALC: 39.7 % — SIGNIFICANT CHANGE UP (ref 39–50)
HGB BLD-MCNC: 13 G/DL — SIGNIFICANT CHANGE UP (ref 13–17)
LYMPHOCYTES # BLD AUTO: 1.4 K/UL — SIGNIFICANT CHANGE UP (ref 1–3.3)
LYMPHOCYTES # BLD AUTO: 14.9 % — SIGNIFICANT CHANGE UP (ref 13–44)
MCHC RBC-ENTMCNC: 31.6 PG — SIGNIFICANT CHANGE UP (ref 27–34)
MCHC RBC-ENTMCNC: 32.8 GM/DL — SIGNIFICANT CHANGE UP (ref 32–36)
MCV RBC AUTO: 96.4 FL — SIGNIFICANT CHANGE UP (ref 80–100)
MONOCYTES # BLD AUTO: 0.4 K/UL — SIGNIFICANT CHANGE UP (ref 0–0.9)
MONOCYTES NFR BLD AUTO: 4.6 % — SIGNIFICANT CHANGE UP (ref 2–14)
NEUTROPHILS # BLD AUTO: 7.6 K/UL — HIGH (ref 1.8–7.4)
NEUTROPHILS NFR BLD AUTO: 79.9 % — HIGH (ref 43–77)
PLATELET # BLD AUTO: 173 K/UL — SIGNIFICANT CHANGE UP (ref 150–400)
POTASSIUM SERPL-MCNC: 4.2 MMOL/L — SIGNIFICANT CHANGE UP (ref 3.5–5.3)
POTASSIUM SERPL-SCNC: 4.2 MMOL/L — SIGNIFICANT CHANGE UP (ref 3.5–5.3)
RBC # BLD: 4.12 M/UL — LOW (ref 4.2–5.8)
RBC # FLD: 12.9 % — SIGNIFICANT CHANGE UP (ref 10.3–14.5)
SODIUM SERPL-SCNC: 142 MMOL/L — SIGNIFICANT CHANGE UP (ref 135–145)
WBC # BLD: 9.5 K/UL — SIGNIFICANT CHANGE UP (ref 3.8–10.5)
WBC # FLD AUTO: 9.5 K/UL — SIGNIFICANT CHANGE UP (ref 3.8–10.5)

## 2019-07-03 RX ORDER — DOCUSATE SODIUM 100 MG
1 CAPSULE ORAL
Qty: 0 | Refills: 0 | DISCHARGE
Start: 2019-07-03

## 2019-07-03 RX ORDER — ACETAMINOPHEN 500 MG
2 TABLET ORAL
Qty: 0 | Refills: 0 | DISCHARGE
Start: 2019-07-03

## 2019-07-03 RX ORDER — SENNA PLUS 8.6 MG/1
1 TABLET ORAL
Qty: 0 | Refills: 0 | DISCHARGE
Start: 2019-07-03

## 2019-07-03 RX ADMIN — Medication 25 MILLIGRAM(S): at 06:09

## 2019-07-03 RX ADMIN — ISOSORBIDE MONONITRATE 30 MILLIGRAM(S): 60 TABLET, EXTENDED RELEASE ORAL at 12:18

## 2019-07-03 RX ADMIN — Medication 200 MILLIGRAM(S): at 10:16

## 2019-07-03 RX ADMIN — HEPARIN SODIUM 5000 UNIT(S): 5000 INJECTION INTRAVENOUS; SUBCUTANEOUS at 02:12

## 2019-07-03 RX ADMIN — Medication 81 MILLIGRAM(S): at 12:18

## 2019-07-03 RX ADMIN — AMLODIPINE BESYLATE 5 MILLIGRAM(S): 2.5 TABLET ORAL at 06:02

## 2019-07-03 RX ADMIN — Medication 10 MILLIGRAM(S): at 06:02

## 2019-07-03 RX ADMIN — Medication 100 MILLIGRAM(S): at 06:02

## 2019-07-03 RX ADMIN — HEPARIN SODIUM 5000 UNIT(S): 5000 INJECTION INTRAVENOUS; SUBCUTANEOUS at 10:16

## 2019-07-03 RX ADMIN — Medication 100 MILLIGRAM(S): at 12:18

## 2019-07-03 NOTE — DISCHARGE NOTE NURSING/CASE MANAGEMENT/SOCIAL WORK - NSDCPNINST_GEN_ALL_CORE
Please return to the hospital if you experience any of the following: fever, unusual/foul smelling drainage, pain that is uncontrolled w/pain medication, nausea/vomiting/unable to tolerate any diet. Pt. verbalized understanding of discharge instructions. Pt. alert and oriented x 4, ambulatory, w/johnson to leg bag, tolerating diet, vss. Pt. verbalized understanding of medications prescribed and to follow up with MD in time ordered. IV lock removed and safety maintained.

## 2019-07-03 NOTE — DISCHARGE NOTE NURSING/CASE MANAGEMENT/SOCIAL WORK - NSDCPEEMAIL_GEN_ALL_CORE
[facility] for Tobacco Control email [email or web address]

## 2019-07-03 NOTE — PROGRESS NOTE ADULT - SUBJECTIVE AND OBJECTIVE BOX
Post op Check    Pt seen and examined without complaints. Pain is controlled. Denies SOB/CP/N/V.     Vital Signs Last 24 Hrs  T(C): 36.2 (02 Jul 2019 12:58), Max: 36.9 (02 Jul 2019 10:00)  T(F): 97.2 (02 Jul 2019 12:58), Max: 98.4 (02 Jul 2019 10:00)  HR: 63 (02 Jul 2019 15:00) (58 - 71)  BP: 120/69 (02 Jul 2019 15:00) (110/65 - 149/73)  BP(mean): 89 (02 Jul 2019 15:00) (82 - 91)  RR: 15 (02 Jul 2019 15:00) (12 - 16)  SpO2: 96% (02 Jul 2019 15:00) (93% - 100%)    I&O's Summary  currently on CBI    Physical Exam  Gen: NAD, A&Ox3  Pulm: No respiratory distress, no subcostal retractions  CV: RRR, no JVD  Abd: Soft, NT, ND  : 23f 3way johnson in place, urine draining clear on moderate rate CBI                           12.7   6.0   )-----------( 147      ( 02 Jul 2019 14:00 )             37.3       07-02    141  |  107  |  12  ----------------------------<  152<H>  4.1   |  24  |  0.77    Ca    8.3<L>      02 Jul 2019 14:00
 Post op Check - POD#1    Pt seen and examined without complaints. Pain is controlled. Denies SOB/CP/N/V. CBI clamped this am    Vital Signs Last 24 Hrs  T(C): 36.8 (03 Jul 2019 05:19), Max: 36.9 (02 Jul 2019 10:00)  T(F): 98.2 (03 Jul 2019 05:19), Max: 98.5 (02 Jul 2019 23:28)  HR: 66 (03 Jul 2019 05:19) (58 - 75)  BP: 143/69 (03 Jul 2019 05:19) (106/62 - 149/73)  BP(mean): 80 (02 Jul 2019 20:00) (80 - 98)  RR: 18 (03 Jul 2019 05:19) (12 - 18)  SpO2: 92% (03 Jul 2019 05:19) (92% - 100%)    I&O's Summary  CBI clamped this morning    Physical Exam  Gen: NAD, A&Ox3  Pulm: No respiratory distress, no subcostal retractions  CV: RRR, no JVD  Abd: Soft, NT, ND  Back: no CVA tenderness  : 23f 3way johnson in place, urine clear off CBI                          12.7   6.0   )-----------( 147      ( 02 Jul 2019 14:00 )             37.3       07-02    141  |  107  |  12  ----------------------------<  152<H>  4.1   |  24  |  0.77    Ca    8.3<L>      02 Jul 2019 14:00

## 2019-07-03 NOTE — DISCHARGE NOTE NURSING/CASE MANAGEMENT/SOCIAL WORK - NSDCDPATPORTLINK_GEN_ALL_CORE
You can access the [facility] Patient Portal, offered by [facility], by registering with the following website: [email or web address]

## 2019-07-03 NOTE — DISCHARGE NOTE PROVIDER - NSDCCPCAREPLAN_GEN_ALL_CORE_FT
PRINCIPAL DISCHARGE DIAGNOSIS  Diagnosis: BPH (benign prostatic hyperplasia)  Assessment and Plan of Treatment: Call the office if you have fever greater than 101, difficulty urinating, pain not relieved with pain medication, nausea/vomiting. recommend colace/senna to avoid constipation/ straining to have bowel movement.   complete entire 7day course of cipro antibiotic that was started pre-operation. follow up with Dr. Nuñez or Dr. ngo on Monday 7/8 for johnson removal.      SECONDARY DISCHARGE DIAGNOSES  Diagnosis: HLD (hyperlipidemia)  Assessment and Plan of Treatment: continue home medication    Diagnosis: DM (diabetes mellitus)  Assessment and Plan of Treatment: continue home medication    Diagnosis: HTN (hypertension)  Assessment and Plan of Treatment: continue home medication    Diagnosis: CAD (coronary artery disease)  Assessment and Plan of Treatment: continue aspirin. STOP plavix until you follow up with your urologist on Monday 7/8.

## 2019-07-03 NOTE — PROGRESS NOTE ADULT - ASSESSMENT
A/P: 69y Male s/p hoLEP, POD#1  - DVT prophylaxis/OOB   - Incentive spirometry  - Analgesia and antiemetics as needed  - Diet regular   - AM labs  - continue Abx  - keep johnson, monitor urine color   - DC pt. home with Johnson, aspirin and 3 days abx  - lasix   - Plavix on hold until f/u visit
A/P: 69y Male s/p Enucleation of prostate using holmium laser   - DVT prophylaxis/OOB  - Incentive spirometry  - Analgesia and antiemetics as needed  - Diet: Carbohydrates, no snack  - AM labs  - lasix in am   - CBI overnight, clamp in the morning (if clear DC pt with johnson)

## 2019-07-03 NOTE — DISCHARGE NOTE PROVIDER - NSDCCPTREATMENT_GEN_ALL_CORE_FT
PRINCIPAL PROCEDURE  Procedure: Enucleation of prostate using holmium laser  Findings and Treatment: drink plenty of fluids

## 2019-07-03 NOTE — DISCHARGE NOTE NURSING/CASE MANAGEMENT/SOCIAL WORK - NSDCPEWEB_GEN_ALL_CORE
[facility] for Tobacco Control website --- [email or web address] website --- [email or web address]

## 2019-07-03 NOTE — DISCHARGE NOTE PROVIDER - HOSPITAL COURSE
68yo male admitted 7/2 s/p scheduled hoLEP. post op required CBI overnight, clamped POD#1. urine remained clear color. plan for home with johnson. Pt AVSS, hemodynamically stable. tolerating diet, ambulating, pain controlled. will continue cipro at home to complete 7day course.     follow up monday with Dr. Nuñez for johnson removal (7/8)

## 2019-07-03 NOTE — DISCHARGE NOTE PROVIDER - CARE PROVIDER_API CALL
Satya Nuñez)  Urology  [address] [place] [address]  Phone: [phone]  Fax: [phone]  Follow Up Time:

## 2019-07-03 NOTE — DISCHARGE NOTE PROVIDER - NSDCFUADDINST_GEN_ALL_CORE_FT
no heavy lifting or straining to have a bowel movement.   DO NOT take plavix until you follow up on monday 7/8 with Dr. Nuñez or Dr. Pal.   COMPLETE entire course of cipro antibiotic that was started pre-operation.

## 2019-07-05 DIAGNOSIS — Z71.89 OTHER SPECIFIED COUNSELING: ICD-10-CM

## 2019-07-08 ENCOUNTER — APPOINTMENT (OUTPATIENT)
Dept: UROLOGY | Facility: CLINIC | Age: 69
End: 2019-07-08
Payer: MEDICAID

## 2019-07-08 DIAGNOSIS — N40.0 BENIGN PROSTATIC HYPERPLASIA WITHOUT LOWER URINARY TRACT SYMPMS: ICD-10-CM

## 2019-07-08 PROCEDURE — 99024 POSTOP FOLLOW-UP VISIT: CPT

## 2019-07-08 PROCEDURE — 51798 US URINE CAPACITY MEASURE: CPT

## 2019-07-08 NOTE — ASSESSMENT
[FreeTextEntry1] : PVR today was 7mL\par He is able to void on his own and also reports no blood in his urine anymore\par He c/o some urinary frequency, however his stream is getting stronger and he denies incontinence.\par Overall, Pt is doing well and has no complaints at this time.\par [place]

## 2019-07-08 NOTE — HISTORY OF PRESENT ILLNESS
[FreeTextEntry1] : 69 year old male presents s/p prostate laser enucleation of the prostate on 7/2/19.\par Pt is leaving the country on 07/25 and won't be back until September.\par Pt pulled out catheter on his own 2 days ago.\par PVR today was 7mL\par He is able to void on his own and also reports no blood in his urine anymore\par He c/o some urinary frequency, however his stream is getting stronger and he denies incontinence.\par Overall, Pt is doing well and has no complaints at this time.\par RTO for repeat PSA [place].

## 2019-07-09 ENCOUNTER — APPOINTMENT (OUTPATIENT)
Dept: THORACIC SURGERY | Facility: CLINIC | Age: 69
End: 2019-07-09

## 2019-07-10 LAB — SURGICAL PATHOLOGY STUDY: SIGNIFICANT CHANGE UP

## 2019-07-31 ENCOUNTER — APPOINTMENT (OUTPATIENT)
Dept: UROLOGY | Facility: CLINIC | Age: 69
End: 2019-07-31

## 2019-09-30 ENCOUNTER — RX RENEWAL (OUTPATIENT)
Age: 69
End: 2019-09-30

## 2019-10-31 PROCEDURE — 88305 TISSUE EXAM BY PATHOLOGIST: CPT

## 2019-10-31 PROCEDURE — C1782: CPT

## 2019-10-31 PROCEDURE — 86900 BLOOD TYPING SEROLOGIC ABO: CPT

## 2019-10-31 PROCEDURE — 85027 COMPLETE CBC AUTOMATED: CPT

## 2019-10-31 PROCEDURE — 80048 BASIC METABOLIC PNL TOTAL CA: CPT

## 2019-10-31 PROCEDURE — C1889: CPT

## 2019-10-31 PROCEDURE — 82962 GLUCOSE BLOOD TEST: CPT

## 2019-10-31 PROCEDURE — 86901 BLOOD TYPING SEROLOGIC RH(D): CPT

## 2019-12-13 NOTE — PATIENT PROFILE ADULT - HAVE YOU EXPERIENCED VIOLENCE OR A TRAUMATIC EVENT?
12/13/2019    Jesse Tejada MD  [address]  [facility] [address]    RE: Madonna Duque       Dear Colleague,    I had the pleasure of seeing Madonna Duque in the [facility].    HPI and Plan:     Ms. Madonna Duque was seen in followup at [facility] in [place].  She is now 71 years old and is followed for history of coronary disease, prior coronary intervention and prior myocardial infarct.      Five years ago, she was in [place].  She experienced an acute anterior ST elevation myocardial infarct complicated by ventricular fibrillatory arrest.  She was evacuated by air and eventually underwent coronary angiography which resulted in placement of 2 stents in the LAD.  She has done well since that time without recurrent symptoms.      She has dyslipidemia but developed myalgias with 40 mg of rosuvastatin.  She then was in a cholesterol therapy study at [facility] run by Dr. Surjit Perez that was discontinued, then was on therapy with Praluent.  She developed leg aching and weakness with Praluent and it necessitated the discontinuation of that medication though her LDL had fallen to 81 mg/dl. She has been significantly intolerant of statins due to severe myalgias with Zetia Mitty, rosuvastatin, atorvastatin and simvastatin.  She is again in a study at [facility] but her lipids remain abnormal.     She continues on aspirin and beta blockade.  She completed a stress echocardiogram in 2016 with an excellent exercise time and no evidence of ischemia.      Her carvedilol was decreased due to fatigue and light-headedness and she feels much better. She is feeling well and denies any anginal symptoms.  She denies any exertional tolerance or shortness breath.  Each morning, she arises and drives to her daughter and son-in-law's home and [place] to get the children off to school.  During the summer months, she is with him full-time and the days are very active as a meet  with her sister and a friend each of whom have 2 children to care for of the same ages as her grandchildren.     Exam:  This is a healthy woman in no apparent distress.    The blood pressure was 117/76 mmHg, heart rate 75 beats per minute and regular and respiratory rate 14-18 per minute.    The chest was clear to auscultation.    On cardiac auscultation, there was an S1 and S2 without extra sounds or murmur.      Assessment/Plan:  Ms. Duque is doing very well without evidence of recurrent ischemia.  She is physically very active and denies any exertional intolerance or anginal symptoms.    I am concerned with the poor control of her dyslipidemia and we discussed that in detail today.  She notes that she has been told by the  that her health is always a primary importance and the study is apparently approaching in and.  As such, we discussed reinstituting rosuvastatin but at a much lower dose of 5 mg nightly.  I explained that besides lowering the LDL, rosuvastatin has a presumed direct effect on reducing inflammation in the coronary endothelium.  We may not reach the goal of 70 mg/dL or less for the LDL but some therapy is better than none and I am hopeful that the dose can gradually be increased.  I believe she will benefit from aggressive lipid-lowering therapy.      In the absence of symptoms, I have recommended a return with myself at 6 months.   Lipids will be reevaluated at 3 months.    No orders of the defined types were placed in this encounter.      No orders of the defined types were placed in this encounter.      Medications Discontinued During This Encounter   Medication Reason     estradiol (ESTRACE) 0.1 MG/GM vaginal cream Stopped by Patient         Encounter Diagnoses   Name Primary?     Coronary artery disease involving native coronary artery of native heart without angina pectoris      Mixed hyperlipidemia      Coronary artery disease involving native heart without angina  pectoris, unspecified vessel or lesion type        CURRENT MEDICATIONS:  Current Outpatient Medications   Medication Sig Dispense Refill     aspirin 81 MG EC tablet Take 1 tablet (81 mg) by mouth daily 90 tablet 3     carvedilol (COREG) 3.125 MG tablet Take 1 tablet (3.125 mg) by mouth 2 times daily (with meals) 180 tablet 3     Cholecalciferol (VITAMIN D3 PO) Take 5,000 Units by mouth daily       Cranberry 1000 MG CAPS Take 42,000 mg by mouth 2 times daily       omeprazole (PRILOSEC) 20 MG capsule Take 20 mg by mouth daily.       nitroGLYcerin (NITROSTAT) 0.4 MG sublingual tablet Place 1 tablet (0.4 mg) under the tongue every 5 minutes as needed for chest pain If you are still having symptoms after 3 doses (15 minutes) call 911. (Patient not taking: Reported on 12/13/2019) 25 tablet 0     STATIN NOT PRESCRIBED, INTENTIONAL, Please choose reason not prescribed, below         ALLERGIES     Allergies   Allergen Reactions     Crestor [Rosuvastatin]      Muscle aches, debilitating     Codeine Nausea and Vomiting     Shrimp Nausea and Vomiting       PAST MEDICAL HISTORY:  Past Medical History:   Diagnosis Date     Abdominal discomfort 12/13    ct abd and pelvis no cause found     Chest pain 2/14    neg est echo     Colonic polyp 2005    fu 2011 nl and to fu 2021     Coronary artery disease involving native coronary artery of native heart without angina pectoris      Dizzy 2009    mri brain nl     DJD (degenerative joint disease)     neck     Elevated blood sugar      GERD (gastroesophageal reflux disease) 2007    egd, benign stricture     Hypercholesteremia 2010    crestor intolerant, lipitor intolerant     CORINA (iron deficiency anaemia) 2008    ugi and sbft nl, be nl     Left ventricular diastolic dysfunction 1/16    echo done for fu and nl ef, no wma     Osteopenia     fu dexa 2011 -1.9 fem neck and -1.2 spine, stable c/w 2007     Osteopenia of both hips 10/2019    screening dexa     PONV (postoperative nausea and  vomiting)      STEMI (ST elevation myocardial infarction) (H) 2015    in [place] on cruise, lytics then ptca of lad, 2 stents, had vfib arrest, ef on angio 67%, 40% rca, no other dz; fu est echo  nl     Syncope     episode 2017, possible vasovagal     Urethral strictures      Ventricular fibrillation (H) 2015    during mi     Vitamin D deficiency        PAST SURGICAL HISTORY:  Past Surgical History:   Procedure Laterality Date     C LIGATE FALLOPIAN TUBE,POSTPARTUM  86    Tubal Ligation     HC REMOVAL OF OVARY/TUBE(S)  81    Salpingo-Oophorectomy, Unilateral(endometriosis)     LAPAROTOMY EXPLORATORY  80    due to endomet, removed ovary     REPAIR PTOSIS BILATERAL  2012    Procedure:REPAIR PTOSIS BILATERAL; BILATERAL UPPER LID MECHANICAL PTOSIS REPAIR,and ptosis repair; Surgeon:JOVANA BETANCUR; Location:[facility]       FAMILY HISTORY:  Family History   Problem Relation Age of Onset     Diabetes Father      Cancer Father         [place]     Cancer Mother         CLL     Diabetes Paternal Aunt      Diabetes Paternal Aunt      Diabetes Paternal Aunt      Diabetes Paternal Uncle      Diabetes Paternal Uncle      Diabetes Paternal Uncle      Breast Cancer Paternal Aunt      Cancer - colorectal Paternal Aunt      Cancer - colorectal Paternal Aunt      Cancer Paternal Uncle         bladder       SOCIAL HISTORY:  Social History     Socioeconomic History     Marital status:      Spouse name: None     Number of children: 1     Years of education: None     Highest education level: None   Occupational History     Occupation: rn, retired      Employer: [employer] [address]   Social Needs     Financial resource strain: None     Food insecurity:     Worry: None     Inability: None     Transportation needs:     Medical: None     Non-medical: None   Tobacco Use     Smoking status: Former Smoker     Last attempt to quit: 1982     Years since quittin.8     Smokeless  "tobacco: Never Used     Tobacco comment: quit 23 years ago   Substance and Sexual Activity     Alcohol use: No     Alcohol/week: 0.0 standard drinks     Drug use: No     Sexual activity: Yes     Partners: Male   Lifestyle     Physical activity:     Days per week: None     Minutes per session: None     Stress: None   Relationships     Social connections:     Talks on phone: None     Gets together: None     Attends Hinduism service: None     Active member of club or organization: None     Attends meetings of clubs or organizations: None     Relationship status: None     Intimate partner violence:     Fear of current or ex partner: None     Emotionally abused: None     Physically abused: None     Forced sexual activity: None   Other Topics Concern     Parent/sibling w/ CABG, MI or angioplasty before 65F 55M? Not Asked      Service Not Asked     Blood Transfusions Not Asked     Caffeine Concern Not Asked     Occupational Exposure Not Asked     Hobby Hazards Not Asked     Sleep Concern Not Asked     Stress Concern Not Asked     Weight Concern Not Asked     Special Diet No     Comment: heart healthy, weight watches      Back Care Not Asked     Exercise No     Bike Helmet Not Asked     Seat Belt Not Asked     Self-Exams Not Asked   Social History Narrative     None       Review of Systems:  Skin:  Negative       Eyes:  Positive for glasses    ENT:  Negative      Respiratory:  Negative       Cardiovascular:  Negative      Gastroenterology: Negative      Genitourinary:  Negative      Musculoskeletal:  Negative   L hip spurs  Neurologic:  Negative      Psychiatric:  Negative      Heme/Lymph/Imm:  Negative      Endocrine:  Negative        Physical Exam:  Vitals: /75   Pulse 62   Ht 1.626 m (5' 4\")   Wt 67.1 kg (148 lb)   BMI 25.40 kg/m       Constitutional:  cooperative, alert and oriented, well developed, well nourished, in no acute distress        Skin:  warm and dry to the touch          Head:  " normocephalic        Eyes:  sclera white        Lymph:      ENT:           Neck:           Respiratory:  normal breath sounds, clear to auscultation, normal A-P diameter, normal symmetry, normal respiratory excursion, no use of accessory muscles         Cardiac: regular rhythm, normal S1/S2, no S3 or S4, apical impulse not displaced, no murmurs, gallops or rubs                                                         GI:           Extremities and Muscular Skeletal:                 Neurological:  no gross motor deficits;affect appropriate        Psych:  Alert and Oriented x 3;affect appropriate, oriented to time, person and place          Thank you for allowing me to participate in the care of your patient.    Sincerely,     Radha Bennett MD     [facility]     no

## 2019-12-19 ENCOUNTER — APPOINTMENT (OUTPATIENT)
Dept: THORACIC SURGERY | Facility: CLINIC | Age: 69
End: 2019-12-19

## 2019-12-26 ENCOUNTER — APPOINTMENT (OUTPATIENT)
Dept: THORACIC SURGERY | Facility: CLINIC | Age: 69
End: 2019-12-26
Payer: MEDICAID

## 2019-12-26 VITALS
RESPIRATION RATE: 18 BRPM | BODY MASS INDEX: 30.26 KG/M2 | OXYGEN SATURATION: 96 % | DIASTOLIC BLOOD PRESSURE: 67 MMHG | WEIGHT: 199 LBS | HEART RATE: 45 BPM | SYSTOLIC BLOOD PRESSURE: 139 MMHG

## 2019-12-26 PROCEDURE — 99213 OFFICE O/P EST LOW 20 MIN: CPT

## 2019-12-26 RX ORDER — GABAPENTIN 300 MG/1
300 CAPSULE ORAL 3 TIMES DAILY
Qty: 90 | Refills: 3 | Status: ACTIVE | COMMUNITY
Start: 2019-12-26 | End: 1900-01-01

## 2019-12-26 NOTE — PHYSICAL EXAM
[Auscultation Breath Sounds / Voice Sounds] : lungs were clear to auscultation bilaterally [Heart Rate And Rhythm] : heart rate was normal and rhythm regular [Murmurs] : no murmurs [Heart Sounds Gallop] : no gallops [Heart Sounds] : normal S1 and S2 [Heart Sounds Pericardial Friction Rub] : no pericardial rub [Chest Visual Inspection Thoracic Asymmetry] : no chest asymmetry [Examination Of The Chest] : the chest was normal in appearance [Diminished Respiratory Excursion] : normal chest expansion [Bowel Sounds] : normal bowel sounds [Abdomen Soft] : soft [Abdomen Tenderness] : non-tender [Abdomen Mass (___ Cm)] : no abdominal mass palpated [Skin Color & Pigmentation] : normal skin color and pigmentation [] : no rash [Skin Turgor] : normal skin turgor [Sensation] : the sensory exam was normal to light touch and pinprick [Deep Tendon Reflexes (DTR)] : deep tendon reflexes were 2+ and symmetric [Oriented To Time, Place, And Person] : oriented to person, place, and time [No Focal Deficits] : no focal deficits [Impaired Insight] : insight and judgment were intact [Affect] : the affect was normal

## 2019-12-27 RX ORDER — GABAPENTIN 300 MG/1
300 CAPSULE ORAL TWICE DAILY
Qty: 60 | Refills: 3 | Status: COMPLETED | COMMUNITY
Start: 2018-08-03 | End: 2019-12-27

## 2019-12-27 RX ORDER — METFORMIN HYDROCHLORIDE 500 MG/1
500 TABLET, COATED ORAL
Qty: 120 | Refills: 0 | Status: ACTIVE | COMMUNITY
Start: 2019-08-09

## 2019-12-30 NOTE — HISTORY OF PRESENT ILLNESS
[FreeTextEntry1] : 70 y/o M, current smoker, w/ hx of CAD s/p RCA drug-eluting stent placement on 3/3/17, on ASA and Plavix, and Lung CA.\par \par Now 1yr 9 mo yr s/p Rt VATS Robotic-assisted, wedge rxn of RUL nodule, completion RULobectomy, MLND, wedge rxn of RML for additional RUL margin on 3/28/18. Path revealed RUL AdenoCA, acinar predominant w/ additional component of micropapillary, papillary and solid areas, 2cm (total tumor size inclusive of invasive and lepidic components = 2.5cm), G2, STEPAN present, pT1c (possibly T2) N0Mx. RML (additional margin for RUL) wedge rxn was negative for malignancy.\par \par CT Chest on 2/23/19:\par - new 8 mm RmL subpleural nodule (image 20, series 3)\par - post-op changes\par \par PET/CT 3/4/19:\par - 8 mm nodule in anterior aspect of RML, SUVmax=2.2\par - 7 mm nodule in anterolateral apical aspect of Rt lung, SUVmax=2.6\par - 11 mm nodule in posterior apical aspect of Rt lung, SUVmax=5.0\par - a few small areas of subsegmental atelectasis/scarring in both lungs\par - new focal SUVmax=2.8 acitivity centered on soft tissue anterior to Rt 9th rib laterally \par - 37 mm infrarenal abdominal aortic aneurysm extending into bilateral common iliac artery aneurysms\par - a 7 mm Rt hepatic lobe cysts\par \par FNA of RML on 3/28/19. Path showed atypical findings.\par \par Now 7 mo s/p Rt VATS, Robotic assisted, lysis of pulmonary adhesions, RML wedge rxn of nodules x2, FB w/ BAL of RML bronchus on 5/1/19. Path revealed necrotizing epithelioid granulomas, AFB and GMS negative; Rt pleural fluid was negative for malignant cells. \par \par CXR on 06/01/2019: \par - post-op changes\par - new small areas of subsegmental atelectasis at the right lung base\par - new minimal right pleural effusion\par \par CT Chest on 12/17/19:\par - post-op changes\par - two stable nodules along the Lt fissure, measuring 3-4mm (3:52)\par - VIJAY\par \par Patient is here today for a follow up. Admits to increased sensation to Rt anterior chest wall, taking Gabapentin 300mg BID with good relief, SOB on exertion and mild productive cough.

## 2019-12-30 NOTE — ASSESSMENT
[FreeTextEntry1] : 70 y/o M, current smoker, w/ hx of CAD s/p RCA drug-eluting stent placement on 3/3/17, on ASA and Plavix, and Lung CA.\par \par Now 1yr 9 mo yr s/p Rt VATS Robotic-assisted, wedge rxn of RUL nodule, completion RULobectomy, MLND, wedge rxn of RML for additional RUL margin on 3/28/18. Path revealed RUL AdenoCA, acinar predominant w/ additional component of micropapillary, papillary and solid areas, 2cm (total tumor size inclusive of invasive and lepidic components = 2.5cm), G2, STEPAN present, pT1c (possibly T2) N0Mx. RML (additional margin for RUL) wedge rxn was negative for malignancy.\par \par CT Chest on 2/23/19:\par - new 8 mm RmL subpleural nodule (image 20, series 3)\par - post-op changes\par \par PET/CT 3/4/19:\par - 8 mm nodule in anterior aspect of RML, SUVmax=2.2\par \par Now 7 mo s/p Rt VATS, Robotic assisted, lysis of pulmonary adhesions, RML wedge rxn of nodules x2, FB w/ BAL of RML bronchus on 5/1/19. Path revealed necrotizing epithelioid granulomas, AFB and GMS negative; Rt pleural fluid was negative for malignant cells. \par \par CT Chest on 12/17/19:\par - post-op changes\par - two stable nodules along the Lt fissure, measuring 3-4mm (3:52)\par - VIJAY\par \par I have reviewed the patient's medical records and diagnostic images at time of this office consultation and have made the following recommendation:\par 1. CT scan showed no evidence of recurrence, recommended patient to return to office in 6mo w/ CT Chest w/out contrast\par \par \par I personally performed the services described in the documentation, reviewed the documentation recorded by the scribe in my presence and it accurately and completely records my words and actions.\par \par I, Bry Mortensen, RITCHIE, am scribing for and the presence of MARTÍNEZ Aaron, the following sections HISTORY OF PRESENT ILLNESS, PAST MEDICAL/FAMILY/SOCIAL HISTORY; REVIEW OF SYSTEMS; VITAL SIGNS; PHYSICAL EXAM; DISPOSITION.

## 2019-12-30 NOTE — DATA REVIEWED
[FreeTextEntry1] : CT Chest on 12/17/19:\par - post-op changes\par - two stable nodules along the Lt fissure, measuring 3-4mm (3:52)\par - VIJAY

## 2019-12-30 NOTE — CONSULT LETTER
[FreeTextEntry2] : Jarad Cheema MD (PCP)  [FreeTextEntry3] : Williams Mejía MD, MPH \par System Director of Thoracic Surgery \par Director of Comprehensive Lung and Foregut [place] \par Professor Cardiovascular & Thoracic Surgery  \par [facility] [employer] [facility] [employer] [facility]\par

## 2020-01-22 ENCOUNTER — APPOINTMENT (OUTPATIENT)
Dept: CARDIOLOGY | Facility: CLINIC | Age: 70
End: 2020-01-22
Payer: MEDICAID

## 2020-01-22 ENCOUNTER — NON-APPOINTMENT (OUTPATIENT)
Age: 70
End: 2020-01-22

## 2020-01-22 VITALS
HEIGHT: 68 IN | WEIGHT: 202 LBS | SYSTOLIC BLOOD PRESSURE: 150 MMHG | HEART RATE: 47 BPM | DIASTOLIC BLOOD PRESSURE: 87 MMHG | BODY MASS INDEX: 30.62 KG/M2 | RESPIRATION RATE: 16 BRPM | OXYGEN SATURATION: 95 %

## 2020-01-22 VITALS — SYSTOLIC BLOOD PRESSURE: 122 MMHG | DIASTOLIC BLOOD PRESSURE: 70 MMHG

## 2020-01-22 DIAGNOSIS — I25.10 ATHEROSCLEROTIC HEART DISEASE OF NATIVE CORONARY ARTERY W/OUT ANGINA PECTORIS: ICD-10-CM

## 2020-01-22 DIAGNOSIS — I10 ESSENTIAL (PRIMARY) HYPERTENSION: ICD-10-CM

## 2020-01-22 DIAGNOSIS — R00.1 BRADYCARDIA, UNSPECIFIED: ICD-10-CM

## 2020-01-22 PROCEDURE — 93000 ELECTROCARDIOGRAM COMPLETE: CPT

## 2020-01-22 PROCEDURE — 99214 OFFICE O/P EST MOD 30 MIN: CPT

## 2020-01-22 RX ORDER — AMOXICILLIN AND CLAVULANATE POTASSIUM 500; 125 MG/1; MG/1
500-125 TABLET, FILM COATED ORAL
Qty: 14 | Refills: 0 | Status: DISCONTINUED | COMMUNITY
Start: 2019-04-09 | End: 2020-01-22

## 2020-01-22 RX ORDER — CIPROFLOXACIN HYDROCHLORIDE 500 MG/1
500 TABLET, FILM COATED ORAL
Qty: 14 | Refills: 0 | Status: DISCONTINUED | COMMUNITY
Start: 2019-07-01 | End: 2020-01-22

## 2020-01-22 RX ORDER — TAMSULOSIN HYDROCHLORIDE 0.4 MG/1
0.4 CAPSULE ORAL
Qty: 180 | Refills: 3 | Status: DISCONTINUED | COMMUNITY
Start: 2018-08-31 | End: 2020-01-22

## 2020-01-22 RX ORDER — DOCUSATE SODIUM 100 MG/1
100 CAPSULE, LIQUID FILLED ORAL
Qty: 30 | Refills: 0 | Status: DISCONTINUED | COMMUNITY
Start: 2018-04-01 | End: 2020-01-22

## 2020-01-22 RX ORDER — SENNOSIDES 8.6 MG TABLETS 8.6 MG/1
8.6 TABLET ORAL
Qty: 20 | Refills: 0 | Status: DISCONTINUED | COMMUNITY
Start: 2018-04-01 | End: 2020-01-22

## 2020-01-22 RX ORDER — OXYCODONE 5 MG/1
5 TABLET ORAL
Qty: 30 | Refills: 0 | Status: DISCONTINUED | COMMUNITY
Start: 2018-04-01 | End: 2020-01-22

## 2020-01-22 RX ORDER — CLOPIDOGREL BISULFATE 75 MG/1
75 TABLET, FILM COATED ORAL DAILY
Qty: 30 | Refills: 5 | Status: ACTIVE | COMMUNITY
Start: 1900-01-01 | End: 1900-01-01

## 2020-01-22 RX ORDER — METOPROLOL SUCCINATE 25 MG/1
25 TABLET, EXTENDED RELEASE ORAL DAILY
Qty: 30 | Refills: 5 | Status: DISCONTINUED | COMMUNITY
Start: 2019-04-08 | End: 2020-01-22

## 2020-01-22 RX ORDER — OXYCODONE HYDROCHLORIDE 15 MG/1
15 TABLET, FILM COATED, EXTENDED RELEASE ORAL
Qty: 14 | Refills: 0 | Status: DISCONTINUED | COMMUNITY
Start: 2018-04-25 | End: 2020-01-22

## 2020-01-22 NOTE — REASON FOR VISIT
[Coronary Artery Disease] : coronary artery disease [Follow-Up - Clinic] : a clinic follow-up of [Hypertension] : hypertension

## 2020-01-27 PROBLEM — R00.1 SINUS BRADYCARDIA: Status: ACTIVE | Noted: 2020-01-27

## 2020-01-27 NOTE — PHYSICAL EXAM
[General Appearance - Well Developed] : well developed [Normal Appearance] : normal appearance [Well Groomed] : well groomed [General Appearance - Well Nourished] : well nourished [No Deformities] : no deformities [Normal Conjunctiva] : the conjunctiva exhibited no abnormalities [General Appearance - In No Acute Distress] : no acute distress [Eyelids - No Xanthelasma] : the eyelids demonstrated no xanthelasmas [No Oral Cyanosis] : no oral cyanosis [No Oral Pallor] : no oral pallor [Normal Oral Mucosa] : normal oral mucosa [Respiration, Rhythm And Depth] : normal respiratory rhythm and effort [Auscultation Breath Sounds / Voice Sounds] : lungs were clear to auscultation bilaterally [Exaggerated Use Of Accessory Muscles For Inspiration] : no accessory muscle use [Edema] : no peripheral edema present [Murmurs] : no murmurs present [Heart Sounds] : normal S1 and S2 [Abdomen Soft] : soft [Abdomen Tenderness] : non-tender [Cyanosis, Localized] : no localized cyanosis [Abnormal Walk] : normal gait [Skin Color & Pigmentation] : normal skin color and pigmentation [No Venous Stasis] : no venous stasis [] : no rash [Oriented To Time, Place, And Person] : oriented to person, place, and time [FreeTextEntry1] : bradycardia

## 2020-01-27 NOTE — HISTORY OF PRESENT ILLNESS
[FreeTextEntry1] : Doing okay. Occasional shortness of breath on mild-moderate exertion. Denies chest pain or palpitations. Was told his heart rate is slow. Denies dizziness. Of note, patient with RUL/RML resection for treatment of adenocarcinoma of right lung.

## 2020-01-27 NOTE — DISCUSSION/SUMMARY
[Coronary Artery Disease] : coronary artery disease [Stable] : stable [Hypertension] : hypertension [FreeTextEntry1] : \par Currently stable from a cardiovascular standpoint. Normotensive. Euvolemic. Stable CAD (distal RCA stent). No ischemic or CHF symptoms. SOB likely secondary to underlying lung condition/issue. Patient with asymptomatic sinus bradycardia. Will monitor at this time. Continue current medications. ECG completed today and reviewed. Follow up in 1 month.

## 2020-01-27 NOTE — REVIEW OF SYSTEMS
[Negative] : Neurological [see HPI] : see HPI [Shortness Of Breath] : shortness of breath [Dyspnea on exertion] : dyspnea during exertion [Chest  Pressure] : no chest pressure [Chest Pain] : no chest pain [Lower Ext Edema] : no extremity edema [Leg Claudication] : no intermittent leg claudication [Palpitations] : no palpitations

## 2020-02-26 ENCOUNTER — APPOINTMENT (OUTPATIENT)
Dept: CARDIOLOGY | Facility: CLINIC | Age: 70
End: 2020-02-26

## 2020-05-28 NOTE — H&P PST ADULT - PRO INTERPRETER NEED 2
Pulmonary, Critical Care, and Sleep Medicine~Progress Note    Name: Elana Avila MRN: [id number]   : 1961 Hospital: [address]   Date: 2020 10:42 AM Admission: 2020     Impression Plan   1. Acute hypoxic resp failure   2. COVID19 PNA   3. Obesity   4. Hx of total mastectomy  1. High risk of decline   2. Started pulsed Sloane on ; now on hold secondary to NIV. If she can come off today then we will need to restart it today. 3. lovenox   4. IL6 is 91, with clinical decline we should consider starting on actemra; we will see what ID thinks. 5. azithromax   6. Vit c/zinc   7. On Remdesivir now   8. Inflammatory markers      Daily Progression:    RRT yesterday afternoon secondary to resp distress. Looks to ok this am    I have reviewed the labs and previous days notes. Pertinent items are noted in HPI. OBJECTIVE:     Vital Signs:       Visit Vitals  /82 (BP 1 Location: Left arm, BP Patient Position: At rest)   Pulse 81   Temp 98.6 °F (37 °C)   Resp 23   Ht 5' 7.01\" (1.702 m)   Wt 108.1 kg (238 lb 4.8 oz)   SpO2 92%   BMI 37.31 kg/m²      Temp (24hrs), Av.6 °F (37 °C), Min:98.5 °F (36.9 °C), Max:98.9 °F (37.2 °C)     Intake/Output:     Last shift: No intake/output data recorded.     Last 3 shifts:  1901 -  0700  In: 175.8 [I.V.:175.8]  Out: -           Intake/Output Summary (Last 24 hours) at 2020 0910  Last data filed at 2020 0000  Gross per 24 hour   Intake 175.83 ml   Output --   Net 175.83 ml       Physical Exam:                                        Exam Findings Other   General: No resp distress noted, appears stated age    [de-identified]:  No ulcers, JVD not elevated, no cervical LAD    Chest: No pectus deformity, normal chest rise b/l    HEART:  Deferred     Lungs:  Deferred     ABD: Soft/NT, non rigid mildly distended    EXT: No cyanosis/clubbing/edema, normal peripheral pulses Skin: No rashes or ulcers, no mottling    Neuro: A/O x 3        Medications:  Current Facility-Administered Medications   Medication Dose Route Frequency    butalbital-acetaminophen-caffeine (FIORICET, ESGIC) -40 mg per tablet 1 Tab  1 Tab Oral Q12H PRN    pantoprazole (PROTONIX) 40 mg in 0.9% sodium chloride 10 mL injection  40 mg IntraVENous Q12H    remdesivir 100 mg in 0.9% sodium chloride 250 mL IVPB  100 mg IntraVENous Q24H    lidocaine 4 % patch 2 Patch  2 Patch TransDERmal Q24H    levothyroxine (SYNTHROID) tablet 250 mcg  250 mcg Oral 6am    allopurinoL (ZYLOPRIM) tablet 100 mg  100 mg Oral DAILY    acetaminophen (TYLENOL) tablet 650 mg  650 mg Oral Q6H PRN    Or    acetaminophen (TYLENOL) suppository 650 mg  650 mg Rectal Q6H PRN    fluticasone propionate (FLONASE) 50 mcg/actuation nasal spray 2 Spray  2 Spray Both Nostrils DAILY    albuterol (PROVENTIL HFA, VENTOLIN HFA, PROAIR HFA) inhaler 2 Puff  2 Puff Inhalation Q6H PRN    zinc sulfate (ZINCATE) 220 (50) mg capsule 1 Cap  1 Cap Oral DAILY    ascorbic acid (vitamin C) (VITAMIN C) tablet 500 mg  500 mg Oral BID    enoxaparin (LOVENOX) injection 40 mg  40 mg SubCUTAneous Q12H    azithromycin (ZITHROMAX) 500 mg in 0.9% sodium chloride (MBP/ADV) 250 mL  500 mg IntraVENous Q24H    guaiFENesin (ROBITUSSIN) 100 mg/5 mL oral liquid 100 mg  100 mg Oral Q4H PRN    sodium chloride (NS) flush 5-40 mL  5-40 mL IntraVENous Q8H    sodium chloride (NS) flush 5-40 mL  5-40 mL IntraVENous PRN    ondansetron (ZOFRAN) injection 4 mg  4 mg IntraVENous Q6H PRN       Labs:  ABG Recent Labs     05/27/20  1523 05/27/20  1147 05/27/20  1133   PHI 7.374 7.393 7.432   PCO2I 49.8* 45.4* 42.1   PO2I 79* 44* 40*   HCO3I 29.1* 27.7* 28.0*   SO2I 95 79* 77*   FIO2I 100 44 44        CBC Recent Labs     05/27/20  0336 05/26/20  0330 05/25/20  1007   WBC 8.3 7.6 8.6   HGB 10.7* 11.8 11.9   HCT 35.0 38.4 38.4    269 270   MCV 95.4 95.5 94.8   MCH 29.2 29.4 87.4        Metabolic  Panel Recent Labs     05/27/20  0336 05/26/20  0330 05/25/20  1045 05/25/20  1007    135*  --  139   K 3.8 3.6  --  3.8    102  --  99   CO2 29 27  --  32   GLU 88 109*  --  99   BUN 11 16  --  12   CREA 1.13* 1.30*  --  1.21*   CA 8.4* 8.5  --  8.6   ALB 3.1* 3.4*  --  3.9   ALT 62 69  --  78   INR 1.0 0.9 1.0  --         Pertinent Labs                Toi Lomax PA-C  5/28/2020 [place]

## 2020-06-25 ENCOUNTER — APPOINTMENT (OUTPATIENT)
Dept: THORACIC SURGERY | Facility: CLINIC | Age: 70
End: 2020-06-25

## 2020-08-17 PROBLEM — C34.91 ADENOCARCINOMA OF RIGHT LUNG: Status: ACTIVE | Noted: 2018-04-16

## 2020-08-20 ENCOUNTER — APPOINTMENT (OUTPATIENT)
Dept: THORACIC SURGERY | Facility: CLINIC | Age: 70
End: 2020-08-20
Payer: MEDICAID

## 2020-08-20 VITALS
HEART RATE: 63 BPM | WEIGHT: 200 LBS | DIASTOLIC BLOOD PRESSURE: 52 MMHG | SYSTOLIC BLOOD PRESSURE: 122 MMHG | RESPIRATION RATE: 16 BRPM | HEIGHT: 68 IN | BODY MASS INDEX: 30.31 KG/M2 | OXYGEN SATURATION: 95 %

## 2020-08-20 DIAGNOSIS — C34.91 MALIGNANT NEOPLASM OF UNSPECIFIED PART OF RIGHT BRONCHUS OR LUNG: ICD-10-CM

## 2020-08-20 PROCEDURE — 99215 OFFICE O/P EST HI 40 MIN: CPT

## 2020-08-21 NOTE — DATA REVIEWED
[FreeTextEntry1] : CT Chest on 8/15/2020:\par - stable post-op changes; VIJAY\par - stable 3-4mm nodules along the Lt major fissure (3:57, 58, and 65)

## 2020-08-21 NOTE — HISTORY OF PRESENT ILLNESS
[FreeTextEntry1] : 71 y/o Polish-speaking M, current smoker, w/ hx of CAD s/p RCA drug-eluting stent placement on 3/3/17, on ASA and Plavix, and Lung CA.\par \par Now 2 yr 5 mo yr s/p Rt VATS Robotic-assisted, wedge rxn of RUL nodule, completion RULobectomy, MLND, wedge rxn of RML for additional RUL margin on 3/28/18. Path revealed RUL AdenoCA, acinar predominant w/ additional component of micropapillary, papillary and solid areas, 2cm (total tumor size inclusive of invasive and lepidic components = 2.5cm), G2, STEPAN present, pT1c (possibly T2) N0Mx. RML (additional margin for RUL) wedge rxn was negative for malignancy.\par \par CT Chest on 2/23/19:\par - new 8 mm RmL subpleural nodule (image 20, series 3)\par - post-op changes\par \par PET/CT 3/4/19:\par - 8 mm nodule in anterior aspect of RML, SUVmax=2.2\par - 7 mm nodule in anterolateral apical aspect of Rt lung, SUVmax=2.6\par - 11 mm nodule in posterior apical aspect of Rt lung, SUVmax=5.0\par - a few small areas of subsegmental atelectasis/scarring in both lungs\par - new focal SUVmax=2.8 acitivity centered on soft tissue anterior to Rt 9th rib laterally \par - 37 mm infrarenal abdominal aortic aneurysm extending into bilateral common iliac artery aneurysms\par - a 7 mm Rt hepatic lobe cysts\par \par FNA of RML on 3/28/19. Path showed atypical findings.\par \par Now 1 yr 3 mo s/p Rt VATS, Robotic assisted, lysis of pulmonary adhesions, RML wedge rxn of nodules x2, FB w/ BAL of RML bronchus on 5/1/19. Path revealed necrotizing epithelioid granulomas, AFB and GMS negative; Rt pleural fluid was negative for malignant cells. \par \par CT Chest on 12/17/19:\par - post-op changes\par - two stable nodules along the Lt fissure, measuring 3-4mm (3:52)\par - VIJAY\par \par CT Chest on 8/15/2020:\par - stable post-op changes; VIJAY\par - stable 3-4mm nodules along the Lt major fissure (3:57, 58, and 65)\par \par Patient is here today for a follow up. Patient's daughter stated that he had a CT scan in [place] showing an anterior mediastinal mass (ICD-10 C34.0) and was told that it was "aggressive and malignant".

## 2020-08-21 NOTE — CONSULT LETTER
[Consult Letter:] : I had the pleasure of evaluating your patient, [unfilled]. [Dear  ___] : Dear  [unfilled], [( Thank you for referring [unfilled] for consultation for _____ )] : Thank you for referring [unfilled] for consultation for [unfilled] [Please see my note below.] : Please see my note below. [Consult Closing:] : Thank you very much for allowing me to participate in the care of this patient.  If you have any questions, please do not hesitate to contact me. [Sincerely,] : Sincerely, [FreeTextEntry2] : Jarad Cheema MD (PCP)  [FreeTextEntry3] : Williams Mejía MD, MPH \par System Director of Thoracic Surgery \par Director of Comprehensive Lung and Foregut [place] \par Professor Cardiovascular & Thoracic Surgery  \par [employer] [facility]\par \par [facility]\par [address]\par Oncology [address]\[place] [address]\par Tel: [phone]\par Fax: [phone]\par

## 2020-08-21 NOTE — PHYSICAL EXAM
[Auscultation Breath Sounds / Voice Sounds] : lungs were clear to auscultation bilaterally [Heart Sounds Gallop] : no gallops [Heart Rate And Rhythm] : heart rate was normal and rhythm regular [Heart Sounds] : normal S1 and S2 [Murmurs] : no murmurs [Heart Sounds Pericardial Friction Rub] : no pericardial rub [Examination Of The Chest] : the chest was normal in appearance [Chest Visual Inspection Thoracic Asymmetry] : no chest asymmetry [Diminished Respiratory Excursion] : normal chest expansion [Abdomen Soft] : soft [Bowel Sounds] : normal bowel sounds [Abdomen Mass (___ Cm)] : no abdominal mass palpated [Abdomen Tenderness] : non-tender [Skin Turgor] : normal skin turgor [Skin Color & Pigmentation] : normal skin color and pigmentation [] : no rash [Sensation] : the sensory exam was normal to light touch and pinprick [No Focal Deficits] : no focal deficits [Deep Tendon Reflexes (DTR)] : deep tendon reflexes were 2+ and symmetric [Impaired Insight] : insight and judgment were intact [Oriented To Time, Place, And Person] : oriented to person, place, and time [Affect] : the affect was normal

## 2020-08-21 NOTE — ASSESSMENT
[FreeTextEntry1] : 69 y/o Polish-speaking M, current smoker, w/ hx of CAD s/p RCA drug-eluting stent placement on 3/3/17, on ASA and Plavix, and Lung CA.\par \par Now 2 yr 5 mo yr s/p Rt VATS Robotic-assisted, wedge rxn of RUL nodule, completion RULobectomy, MLND, wedge rxn of RML for additional RUL margin on 3/28/18. Path revealed RUL AdenoCA, acinar predominant w/ additional component of micropapillary, papillary and solid areas, 2cm (total tumor size inclusive of invasive and lepidic components = 2.5cm), G2, STEPAN present, pT1c (possibly T2) N0Mx. RML (additional margin for RUL) wedge rxn was negative for malignancy.\par \par CT Chest on 2/23/19:\par - new 8 mm RmL subpleural nodule (image 20, series 3)\par \par Now 1 yr 3 mo s/p Rt VATS, Robotic assisted, lysis of pulmonary adhesions, RML wedge rxn of nodules x2, FB w/ BAL of RML bronchus on 5/1/19. Path revealed necrotizing epithelioid granulomas, AFB and GMS negative; Rt pleural fluid was negative for malignant cells. \par \par CT Chest on 8/15/2020:\par - stable post-op changes; VIJAY\par - stable 3-4mm nodules along the Lt major fissure (3:57, 58, and 65)\par \par I have reviewed the patient's medical records and diagnostic images at time of this office consultation and have made the following recommendation:\par 1. CT from [place] reviewed and compared to CT scans done in [place] in Dec 2019 and Aug 2020, there is a small anterior mediastinal mass but has been stable since 2018. I recommended RTC in 6mo with CT Chest w/o contrast.\par \par \par I personally performed the services described in the documentation, reviewed the documentation recorded by the scribe in my presence and it accurately and completely records my words and actions.\par \par I, Bry Mortensen NP, am scribing for and the presence of MARTÍNEZ Aaron, the following sections HISTORY OF PRESENT ILLNESS, PAST MEDICAL/FAMILY/SOCIAL HISTORY; REVIEW OF SYSTEMS; VITAL SIGNS; PHYSICAL EXAM; DISPOSITION.\par \par

## 2020-08-24 ENCOUNTER — RX RENEWAL (OUTPATIENT)
Age: 70
End: 2020-08-24

## 2020-08-24 RX ORDER — AMLODIPINE BESYLATE 5 MG/1
5 TABLET ORAL DAILY
Qty: 30 | Refills: 5 | Status: ACTIVE | COMMUNITY
Start: 2020-08-24 | End: 1900-01-01

## 2020-08-24 RX ORDER — ISOSORBIDE MONONITRATE 30 MG/1
30 TABLET, EXTENDED RELEASE ORAL DAILY
Qty: 30 | Refills: 5 | Status: ACTIVE | COMMUNITY
Start: 2020-08-24 | End: 1900-01-01

## 2020-08-27 ENCOUNTER — APPOINTMENT (OUTPATIENT)
Dept: CARDIOLOGY | Facility: CLINIC | Age: 70
End: 2020-08-27

## 2020-09-29 RX ORDER — ATORVASTATIN CALCIUM 20 MG/1
20 TABLET, FILM COATED ORAL DAILY
Qty: 30 | Refills: 5 | Status: ACTIVE | COMMUNITY
Start: 1900-01-01 | End: 1900-01-01

## 2021-01-13 NOTE — ASU PREOP CHECKLIST - PATIENT'S PERSONAL PROPERTY REMOVED
dentures/hearing aids Protopic Pregnancy And Lactation Text: This medication is Pregnancy Category C. It is unknown if this medication is excreted in breast milk when applied topically.

## 2021-02-24 ENCOUNTER — NON-APPOINTMENT (OUTPATIENT)
Age: 71
End: 2021-02-24

## 2021-02-25 ENCOUNTER — APPOINTMENT (OUTPATIENT)
Dept: THORACIC SURGERY | Facility: CLINIC | Age: 71
End: 2021-02-25

## 2021-08-27 NOTE — ASU PREOP CHECKLIST - AS BP NONINV SITE
Patient requesting refill(s) of:  Hydrochlorothiazide 12 5mg   Last filled:2/12/21  Last appt:7/6/21  Next appt:10/8/21  Jono Greenberg left upper arm

## 2021-11-23 NOTE — H&P PST ADULT - COMFORT LEVEL, ACCEPTABLE
2 Doxycycline Pregnancy And Lactation Text: This medication is Pregnancy Category D and not consider safe during pregnancy. It is also excreted in breast milk but is considered safe for shorter treatment courses.

## 2021-12-09 NOTE — ED PROVIDER NOTE - NS ED MD TWO NIGHTS YN
Rounding and medication pass completed. Refused medications at this time. Will attempt again later. Remains lethargic but arouses more easily. Bloody nose continues w/ minimal drainage noted. Yes

## 2021-12-14 NOTE — PATIENT PROFILE ADULT - INTERPRETATION SERVICES DECLINED
Patient: Ruth Vanegas    Procedure: Procedure(s):  ESOPHAGOGASTRODUODENOSCOPY, WITH BIOPSY  COLONOSCOPY       Diagnosis:Nausea and vomiting, intractability of vomiting not specified, unspecified vomiting type [R11.2]  Diagnosis Additional Information: No value filed.    Anesthesia Type:  General    Note:  Disposition: Outpatient   Postop Pain Control: Uneventful            Sign Out: Well controlled pain   PONV: No   Neuro/Psych: Uneventful            Sign Out: Acceptable/Baseline neuro status   Airway/Respiratory: Uneventful            Sign Out: Acceptable/Baseline resp. status   CV/Hemodynamics: Uneventful            Sign Out: Acceptable CV status; No obvious hypovolemia; No obvious fluid overload   Other NRE: NONE   DID A NON-ROUTINE EVENT OCCUR? No           Last vitals:  Vitals Value Taken Time   /78 12/14/21 1600   Temp     Pulse 96 12/14/21 1554   Resp 16 12/14/21 1600   SpO2 100 % 12/14/21 1604   Vitals shown include unvalidated device data.    Electronically Signed By: Gilberto Mcknight MD  December 14, 2021  5:23 PM   Patient/Caregiver requests family/friend to interpret./Pt daughter translated

## 2022-11-27 NOTE — ED PROVIDER NOTE - CPE EDP SKIN NORM
"Pt had new order for metoprolol. BP is 98/59.  Informed Cardiologist Dr Ramon for low BP. Per him \" Do not worry about blood pressure. Just give it\".  " normal...

## 2023-04-03 NOTE — ED PROVIDER NOTE - MUSCULOSKELETAL, MLM
Spine appears normal, range of motion is not limited, no muscle or joint tenderness 03-Apr-2023 10:26

## 2024-04-10 NOTE — DISCHARGE NOTE PROVIDER - NSDCHHATTENDCERT_GEN_ALL_CORE
My signature below certifies that the above stated patient is homebound and upon completion of the Face-To-Face encounter, has the need for intermittent skilled nursing, physical therapy and/or speech or occupational therapy services in their home for their current diagnosis as outlined in their initial plan of care. These services will continue to be monitored by myself or another physician.

## 2025-04-09 NOTE — H&P PST ADULT - SURGICAL SITE INCISION
Nutrition Services Progress Note    Physician: Asha Omalley*  Diagnosis: non small cell lung cancer; hx breast cancer (treatment start 2015)  Treatment:  current: Carboplatin/pemetrexed (start 2/26/25)    Food and Nutrition Related History:  - Met with pt and  Johnis in infusion clinic for nutrition follow up during treatment.   - Endorses feeling much better since started having daily BM - utilizing Mg, chocolate milk, raisins, nuts, prune juice.   - Appetite overall continues improved with more frequent BM. Does have some days/meals of anorexia, will consume 1 Ensure Complete these days.   - Consumes 3 water bottles and 1 16 oz tea daily plus chocolate milk/juices/lemonade daily (previously noted increase total volume per recommendation for nephrologist.)     Anthropometric Measurements:  Estimated body mass index is 26.36 kg/m² as calculated from the following:    Height as of 2/26/25: 5' 6.54\" (1.69 m).    Weight as of this encounter: 75.3 kg (166 lb 0.1 oz).    Wt Readings from Last 10 Encounters:   04/09/25 75.3 kg (166 lb 0.1 oz)   03/19/25 77.8 kg (171 lb 8.3 oz)   02/26/25 77.5 kg (170 lb 13.7 oz)   02/24/25 77.2 kg (170 lb 3.1 oz)   02/13/25 79.3 kg (174 lb 13.2 oz)   01/31/25 78.5 kg (173 lb)   01/27/25 78.7 kg (173 lb 6.4 oz)   01/20/25 81.2 kg (179 lb)   01/19/25 79 kg (174 lb 2.6 oz)   01/15/25 79.8 kg (176 lb)     Usual Weight:  180 lbs (at surgery, 1/17/25)  Weight Change: This date, 5 lbs/2.9% wt loss x 3 weeks (3/19-4/9/25).   Previously, wt stable since treatment start (2/26/25).  At treatment start: 9-10 lbs/5% wt loss since surgery (1/17/25-2/26/25), 6 weeks    Biochemical Data, Medical Tests, and Procedures:  Labs reviewed, noted Mg trend - on supplement  January 17, 2025 underwent a left upper lobectomy and lymph node dissection.    Nutrition-Focused Physical Findings:  Overall appearance:  no muscle or fat losses visually observed    Comparative Standards:  Estimated  energy needs -  Total energy estimated needs (CS-1.1.1):   Calculation Weight: 77.5 kg  Calorie needs: 3944-3438 (25 - 30 kcal/kg)  Protein needs:  (1.2-1.5 g/kg)    Nutrition Diagnosis:  Inadequate protein-energy intake related to increased needs due to malignancy, treatment, recent surgery, fluctuating anorexia, irregular bowel habits at baseline (resolved), dysgeusia, mild nausea (resolved)  as evidenced by unintentional wt loss (5% in 6 weeks) at treatment start and mild additional loss this date despite overall improving po intakes, provision of oral nutrition supplement    Nutrition Diagnosis Status:  Active nutrition diagnosis    Intervention:  Nutrition relationship to health/disease:    Reinforced po intakes. Goal 4-6 daily. Reinforced high protein high kcal foods at each.Supported efforts.   Supported dietary efforts for more frequent stool pattern.   Reinforced fluids. Goal 64-80 oz non-caffeine daily.      Prior encounter:   Handouts: High Calorie Meal and Snack Ideas; Nausea and Vomiting; Taste and Smell Changes    Commercial beverage:    Recommended 1-2 oral nutrition supplements daily depending on po intakes. Preferably one with >300 kcal per carton. OK to use store brand version if desire. OK to use as meal/snack alternate    Medications:    Recommended anti-emetic and bowel medications per provider - has compazine ordered   Reinforced/supported Mg supplement per provider    Monitoring and Evaluation:  Amount of food:   Goal to meet nutrient needs  Goal status: Some progress towards goal despite mild wt loss     no

## 2025-08-01 NOTE — ED PROVIDER NOTE - DURATION
Patient made aware of lyme titer test, verbalized understanding. New script for additional 30 Doxycycline sent to pharmacy.    day(s)